# Patient Record
Sex: FEMALE | Race: WHITE | Employment: OTHER | URBAN - NONMETROPOLITAN AREA
[De-identification: names, ages, dates, MRNs, and addresses within clinical notes are randomized per-mention and may not be internally consistent; named-entity substitution may affect disease eponyms.]

---

## 2017-03-09 ENCOUNTER — OFFICE VISIT (OUTPATIENT)
Dept: PHYSICAL MEDICINE AND REHAB | Age: 82
End: 2017-03-09

## 2017-03-09 VITALS
DIASTOLIC BLOOD PRESSURE: 72 MMHG | BODY MASS INDEX: 23.3 KG/M2 | HEIGHT: 66 IN | SYSTOLIC BLOOD PRESSURE: 139 MMHG | WEIGHT: 145 LBS | HEART RATE: 70 BPM

## 2017-03-09 DIAGNOSIS — G62.0 DRUG-INDUCED POLYNEUROPATHY (HCC): ICD-10-CM

## 2017-03-09 DIAGNOSIS — R56.9 PARTIAL SEIZURE (HCC): Primary | ICD-10-CM

## 2017-03-09 PROCEDURE — G8420 CALC BMI NORM PARAMETERS: HCPCS | Performed by: PSYCHIATRY & NEUROLOGY

## 2017-03-09 PROCEDURE — 1036F TOBACCO NON-USER: CPT | Performed by: PSYCHIATRY & NEUROLOGY

## 2017-03-09 PROCEDURE — 1123F ACP DISCUSS/DSCN MKR DOCD: CPT | Performed by: PSYCHIATRY & NEUROLOGY

## 2017-03-09 PROCEDURE — 1090F PRES/ABSN URINE INCON ASSESS: CPT | Performed by: PSYCHIATRY & NEUROLOGY

## 2017-03-09 PROCEDURE — G8484 FLU IMMUNIZE NO ADMIN: HCPCS | Performed by: PSYCHIATRY & NEUROLOGY

## 2017-03-09 PROCEDURE — 99213 OFFICE O/P EST LOW 20 MIN: CPT | Performed by: PSYCHIATRY & NEUROLOGY

## 2017-03-09 PROCEDURE — G8400 PT W/DXA NO RESULTS DOC: HCPCS | Performed by: PSYCHIATRY & NEUROLOGY

## 2017-03-09 PROCEDURE — G8427 DOCREV CUR MEDS BY ELIG CLIN: HCPCS | Performed by: PSYCHIATRY & NEUROLOGY

## 2017-03-09 PROCEDURE — 4040F PNEUMOC VAC/ADMIN/RCVD: CPT | Performed by: PSYCHIATRY & NEUROLOGY

## 2017-03-13 DIAGNOSIS — R56.9 PARTIAL SEIZURE (HCC): ICD-10-CM

## 2017-03-13 RX ORDER — PHENYTOIN SODIUM 100 MG/1
CAPSULE, EXTENDED RELEASE ORAL
Qty: 225 CAPSULE | Refills: 1 | Status: SHIPPED | OUTPATIENT
Start: 2017-03-13 | End: 2017-09-13 | Stop reason: SDUPTHER

## 2017-03-18 DIAGNOSIS — R56.9 PARTIAL SEIZURE (HCC): ICD-10-CM

## 2017-03-20 RX ORDER — PHENYTOIN SODIUM 100 MG/1
CAPSULE, EXTENDED RELEASE ORAL
Qty: 270 CAPSULE | Refills: 3 | Status: SHIPPED | OUTPATIENT
Start: 2017-03-20 | End: 2017-03-27 | Stop reason: SDUPTHER

## 2017-03-23 ENCOUNTER — OFFICE VISIT (OUTPATIENT)
Dept: PHYSICAL MEDICINE AND REHAB | Age: 82
End: 2017-03-23

## 2017-03-23 VITALS
HEIGHT: 66 IN | DIASTOLIC BLOOD PRESSURE: 68 MMHG | SYSTOLIC BLOOD PRESSURE: 130 MMHG | HEART RATE: 72 BPM | WEIGHT: 145 LBS | BODY MASS INDEX: 23.3 KG/M2

## 2017-03-23 DIAGNOSIS — G62.0 DRUG-INDUCED POLYNEUROPATHY (HCC): Primary | ICD-10-CM

## 2017-03-23 DIAGNOSIS — R56.9 PARTIAL SEIZURE (HCC): ICD-10-CM

## 2017-03-23 PROCEDURE — 1036F TOBACCO NON-USER: CPT | Performed by: PAIN MEDICINE

## 2017-03-23 PROCEDURE — 1123F ACP DISCUSS/DSCN MKR DOCD: CPT | Performed by: PAIN MEDICINE

## 2017-03-23 PROCEDURE — 4040F PNEUMOC VAC/ADMIN/RCVD: CPT | Performed by: PAIN MEDICINE

## 2017-03-23 PROCEDURE — G8427 DOCREV CUR MEDS BY ELIG CLIN: HCPCS | Performed by: PAIN MEDICINE

## 2017-03-23 PROCEDURE — 1090F PRES/ABSN URINE INCON ASSESS: CPT | Performed by: PAIN MEDICINE

## 2017-03-23 PROCEDURE — G8400 PT W/DXA NO RESULTS DOC: HCPCS | Performed by: PAIN MEDICINE

## 2017-03-23 PROCEDURE — G8484 FLU IMMUNIZE NO ADMIN: HCPCS | Performed by: PAIN MEDICINE

## 2017-03-23 PROCEDURE — G8420 CALC BMI NORM PARAMETERS: HCPCS | Performed by: PAIN MEDICINE

## 2017-03-23 PROCEDURE — 99205 OFFICE O/P NEW HI 60 MIN: CPT | Performed by: PAIN MEDICINE

## 2017-03-23 RX ORDER — DULOXETIN HYDROCHLORIDE 30 MG/1
30 CAPSULE, DELAYED RELEASE ORAL DAILY
Qty: 30 CAPSULE | Refills: 0 | Status: SHIPPED | OUTPATIENT
Start: 2017-03-23 | End: 2017-03-27

## 2017-03-23 ASSESSMENT — ENCOUNTER SYMPTOMS
WHEEZING: 0
RECTAL PAIN: 0
ANAL BLEEDING: 0
SHORTNESS OF BREATH: 0
BLOOD IN STOOL: 0
COLOR CHANGE: 0
SORE THROAT: 0
SINUS PRESSURE: 0
EYE DISCHARGE: 0
VOICE CHANGE: 0
DIARRHEA: 0
CONSTIPATION: 0
FACIAL SWELLING: 0
TROUBLE SWALLOWING: 0
COUGH: 0
VOMITING: 0
CHEST TIGHTNESS: 0
BACK PAIN: 1
NAUSEA: 0
RHINORRHEA: 0
PHOTOPHOBIA: 0
ABDOMINAL DISTENTION: 0
EYE PAIN: 0
STRIDOR: 0
ABDOMINAL PAIN: 0
EYE ITCHING: 0
EYE REDNESS: 0
APNEA: 0
CHOKING: 0

## 2017-03-27 ENCOUNTER — OFFICE VISIT (OUTPATIENT)
Dept: INTERNAL MEDICINE | Age: 82
End: 2017-03-27

## 2017-03-27 VITALS
HEIGHT: 66 IN | WEIGHT: 145 LBS | DIASTOLIC BLOOD PRESSURE: 70 MMHG | SYSTOLIC BLOOD PRESSURE: 132 MMHG | HEART RATE: 70 BPM | BODY MASS INDEX: 23.3 KG/M2

## 2017-03-27 DIAGNOSIS — R56.9 PARTIAL SEIZURE (HCC): ICD-10-CM

## 2017-03-27 DIAGNOSIS — G62.0 DRUG-INDUCED POLYNEUROPATHY (HCC): Primary | ICD-10-CM

## 2017-03-27 PROCEDURE — G8427 DOCREV CUR MEDS BY ELIG CLIN: HCPCS | Performed by: INTERNAL MEDICINE

## 2017-03-27 PROCEDURE — 1036F TOBACCO NON-USER: CPT | Performed by: INTERNAL MEDICINE

## 2017-03-27 PROCEDURE — G8400 PT W/DXA NO RESULTS DOC: HCPCS | Performed by: INTERNAL MEDICINE

## 2017-03-27 PROCEDURE — 99213 OFFICE O/P EST LOW 20 MIN: CPT | Performed by: INTERNAL MEDICINE

## 2017-03-27 PROCEDURE — 1090F PRES/ABSN URINE INCON ASSESS: CPT | Performed by: INTERNAL MEDICINE

## 2017-03-27 PROCEDURE — G8484 FLU IMMUNIZE NO ADMIN: HCPCS | Performed by: INTERNAL MEDICINE

## 2017-03-27 PROCEDURE — 4040F PNEUMOC VAC/ADMIN/RCVD: CPT | Performed by: INTERNAL MEDICINE

## 2017-03-27 PROCEDURE — 1123F ACP DISCUSS/DSCN MKR DOCD: CPT | Performed by: INTERNAL MEDICINE

## 2017-03-27 PROCEDURE — G8420 CALC BMI NORM PARAMETERS: HCPCS | Performed by: INTERNAL MEDICINE

## 2017-03-27 RX ORDER — GABAPENTIN 100 MG/1
100 CAPSULE ORAL 3 TIMES DAILY
Qty: 90 CAPSULE | Refills: 3 | Status: SHIPPED | OUTPATIENT
Start: 2017-03-27 | End: 2017-06-07

## 2017-03-27 ASSESSMENT — PATIENT HEALTH QUESTIONNAIRE - PHQ9
SUM OF ALL RESPONSES TO PHQ9 QUESTIONS 1 & 2: 0
2. FEELING DOWN, DEPRESSED OR HOPELESS: 0
SUM OF ALL RESPONSES TO PHQ QUESTIONS 1-9: 0
1. LITTLE INTEREST OR PLEASURE IN DOING THINGS: 0

## 2017-03-29 ENCOUNTER — TELEPHONE (OUTPATIENT)
Dept: INTERNAL MEDICINE | Age: 82
End: 2017-03-29

## 2017-04-05 ENCOUNTER — TELEPHONE (OUTPATIENT)
Dept: FAMILY MEDICINE CLINIC | Age: 82
End: 2017-04-05

## 2017-06-01 LAB
ABSOLUTE BASO #: 0 K/UL (ref 0–0.1)
ABSOLUTE EOS #: 0.1 K/UL (ref 0.1–0.4)
ABSOLUTE LYMPH #: 0.9 K/UL (ref 0.8–5.2)
ABSOLUTE MONO #: 0.4 K/UL (ref 0.1–0.9)
ABSOLUTE NEUT #: 2.7 K/UL (ref 1.3–9.1)
ALBUMIN SERPL-MCNC: 4 G/DL (ref 3.2–5.3)
ALK PHOSPHATASE: 127 IU/L (ref 35–121)
ALT SERPL-CCNC: 9 IU/L (ref 5–59)
AST SERPL-CCNC: 14 IU/L (ref 10–42)
BASOPHILS RELATIVE PERCENT: 0.7 %
BILIRUB SERPL-MCNC: 0.4 MG/DL (ref 0.2–1.3)
BILIRUBIN DIRECT: 0.1 MG/DL (ref 0–0.2)
EOSINOPHILS RELATIVE PERCENT: 3.4 %
HCT VFR BLD CALC: 39.6 % (ref 36–48)
HEMOGLOBIN: 13.9 G/DL (ref 12–16)
LYMPHOCYTE %: 21.1 %
MCH RBC QN AUTO: 33.7 PG (ref 27–34)
MCHC RBC AUTO-ENTMCNC: 35.1 G/DL (ref 31–36)
MCV RBC AUTO: 96.1 FL (ref 80–100)
MONOCYTES # BLD: 9.7 %
NEUTROPHILS RELATIVE PERCENT: 64.9 %
PDW BLD-RTO: 12.3 % (ref 10.8–14.8)
PLATELETS: 161 K/UL (ref 150–450)
RBC: 4.12 M/UL (ref 4–5.5)
TOTAL PROTEIN: 6.4 G/DL (ref 5.8–8)
WBC: 4.1 K/UL (ref 3.7–10.8)

## 2017-06-02 ENCOUNTER — TELEPHONE (OUTPATIENT)
Dept: PHYSICAL MEDICINE AND REHAB | Age: 82
End: 2017-06-02

## 2017-06-02 LAB
DILANTIN FREE/TOTAL: 12.2 UG/ML (ref 10–20)
DOSE TIME: NORMAL

## 2017-06-07 ENCOUNTER — OFFICE VISIT (OUTPATIENT)
Dept: INTERNAL MEDICINE | Age: 82
End: 2017-06-07

## 2017-06-07 VITALS
HEIGHT: 66 IN | WEIGHT: 152 LBS | SYSTOLIC BLOOD PRESSURE: 114 MMHG | BODY MASS INDEX: 24.43 KG/M2 | HEART RATE: 62 BPM | DIASTOLIC BLOOD PRESSURE: 66 MMHG

## 2017-06-07 DIAGNOSIS — G62.0 DRUG-INDUCED POLYNEUROPATHY (HCC): Primary | ICD-10-CM

## 2017-06-07 PROCEDURE — 4040F PNEUMOC VAC/ADMIN/RCVD: CPT | Performed by: INTERNAL MEDICINE

## 2017-06-07 PROCEDURE — G8427 DOCREV CUR MEDS BY ELIG CLIN: HCPCS | Performed by: INTERNAL MEDICINE

## 2017-06-07 PROCEDURE — 1036F TOBACCO NON-USER: CPT | Performed by: INTERNAL MEDICINE

## 2017-06-07 PROCEDURE — 1123F ACP DISCUSS/DSCN MKR DOCD: CPT | Performed by: INTERNAL MEDICINE

## 2017-06-07 PROCEDURE — 1090F PRES/ABSN URINE INCON ASSESS: CPT | Performed by: INTERNAL MEDICINE

## 2017-06-07 PROCEDURE — G8420 CALC BMI NORM PARAMETERS: HCPCS | Performed by: INTERNAL MEDICINE

## 2017-06-07 PROCEDURE — G8400 PT W/DXA NO RESULTS DOC: HCPCS | Performed by: INTERNAL MEDICINE

## 2017-06-07 PROCEDURE — 99214 OFFICE O/P EST MOD 30 MIN: CPT | Performed by: INTERNAL MEDICINE

## 2017-07-10 ENCOUNTER — TELEPHONE (OUTPATIENT)
Dept: INTERNAL MEDICINE | Age: 82
End: 2017-07-10

## 2017-08-24 ENCOUNTER — TELEPHONE (OUTPATIENT)
Dept: FAMILY MEDICINE CLINIC | Age: 82
End: 2017-08-24

## 2017-08-25 ENCOUNTER — TELEPHONE (OUTPATIENT)
Dept: NEUROLOGY | Age: 82
End: 2017-08-25

## 2017-09-13 ENCOUNTER — OFFICE VISIT (OUTPATIENT)
Dept: NEUROLOGY | Age: 82
End: 2017-09-13
Payer: MEDICARE

## 2017-09-13 VITALS
BODY MASS INDEX: 23.3 KG/M2 | HEART RATE: 68 BPM | HEIGHT: 66 IN | SYSTOLIC BLOOD PRESSURE: 137 MMHG | WEIGHT: 145 LBS | DIASTOLIC BLOOD PRESSURE: 73 MMHG

## 2017-09-13 DIAGNOSIS — R56.9 PARTIAL SEIZURE (HCC): Primary | ICD-10-CM

## 2017-09-13 DIAGNOSIS — R56.9 PARTIAL SEIZURE (HCC): ICD-10-CM

## 2017-09-13 PROCEDURE — 1123F ACP DISCUSS/DSCN MKR DOCD: CPT | Performed by: PSYCHIATRY & NEUROLOGY

## 2017-09-13 PROCEDURE — G8400 PT W/DXA NO RESULTS DOC: HCPCS | Performed by: PSYCHIATRY & NEUROLOGY

## 2017-09-13 PROCEDURE — G8420 CALC BMI NORM PARAMETERS: HCPCS | Performed by: PSYCHIATRY & NEUROLOGY

## 2017-09-13 PROCEDURE — 1090F PRES/ABSN URINE INCON ASSESS: CPT | Performed by: PSYCHIATRY & NEUROLOGY

## 2017-09-13 PROCEDURE — 99213 OFFICE O/P EST LOW 20 MIN: CPT | Performed by: PSYCHIATRY & NEUROLOGY

## 2017-09-13 PROCEDURE — 4040F PNEUMOC VAC/ADMIN/RCVD: CPT | Performed by: PSYCHIATRY & NEUROLOGY

## 2017-09-13 PROCEDURE — 1036F TOBACCO NON-USER: CPT | Performed by: PSYCHIATRY & NEUROLOGY

## 2017-09-13 PROCEDURE — G8427 DOCREV CUR MEDS BY ELIG CLIN: HCPCS | Performed by: PSYCHIATRY & NEUROLOGY

## 2017-09-13 RX ORDER — PHENYTOIN SODIUM 100 MG/1
CAPSULE, EXTENDED RELEASE ORAL
Qty: 225 CAPSULE | Refills: 1 | Status: SHIPPED | OUTPATIENT
Start: 2017-09-13 | End: 2018-04-03 | Stop reason: SDUPTHER

## 2017-09-13 RX ORDER — GABAPENTIN 100 MG/1
100 CAPSULE ORAL 2 TIMES DAILY
Qty: 60 CAPSULE | Refills: 1 | Status: SHIPPED | OUTPATIENT
Start: 2017-09-13 | End: 2017-12-18

## 2017-09-20 ENCOUNTER — OFFICE VISIT (OUTPATIENT)
Dept: INTERNAL MEDICINE CLINIC | Age: 82
End: 2017-09-20
Payer: MEDICARE

## 2017-09-20 DIAGNOSIS — Z23 NEED FOR INFLUENZA VACCINATION: Primary | ICD-10-CM

## 2017-09-20 PROCEDURE — G0008 ADMIN INFLUENZA VIRUS VAC: HCPCS | Performed by: INTERNAL MEDICINE

## 2017-09-20 PROCEDURE — 90662 IIV NO PRSV INCREASED AG IM: CPT | Performed by: INTERNAL MEDICINE

## 2017-09-21 ENCOUNTER — TELEPHONE (OUTPATIENT)
Dept: NEUROLOGY | Age: 82
End: 2017-09-21

## 2017-11-02 ENCOUNTER — TELEPHONE (OUTPATIENT)
Dept: INTERNAL MEDICINE CLINIC | Age: 82
End: 2017-11-02

## 2017-11-02 NOTE — TELEPHONE ENCOUNTER
Pt called stated she needs her neuropathy pain taken care of. Stated that Dr. Kamilla Newby has referred her to a specialist in [facility] and she can not go there and feels there is something local instead. Has seen Dr. Vicki Odom once and has not been back for follow-up because she does not want to see the NP and she could not tolerate the Gabapentin. She is wanting your opinion as to what she can do for the neuropathy and also if you know of a Sheryl Luther with [facility] physical medicine?

## 2017-11-02 NOTE — TELEPHONE ENCOUNTER
left message that he wanted to speak to Dr. Jamaal Edouard MA regarding a procedure that his wife is needing.

## 2017-11-02 NOTE — TELEPHONE ENCOUNTER
Only a limited no of meds for same, all with some side effects    I dont know this person; see if can find info    Could consider Quell; commercials on tv and could look up on internet.   It is an electrical device to suppress pain impulses

## 2017-11-03 NOTE — TELEPHONE ENCOUNTER
Pt informed of Dr. Duyen Scott response and that Kevin Monroy is the Director of Rehab Services at [address] [place] [address]. She verbalized understanding, and will look into the Quell device.

## 2017-11-06 ENCOUNTER — TELEPHONE (OUTPATIENT)
Dept: INTERNAL MEDICINE CLINIC | Age: 82
End: 2017-11-06

## 2017-11-06 NOTE — TELEPHONE ENCOUNTER
Patient called stating that Dr. Frank Harada want her to be referred to a specialist out of town for her neuropathy and they can not travel that far . She states that she does not think the Quell will work for her feet as it is a wrap. Patient is going to check on socks that are for diabetic neuropathy but she is getting desperate and afraid she could fall . Patient was asking if you would prescribe her Gabapentin as you have in the past. Tried to explain to patient that if she is not seen in the office regularly that you cannot prescribe Gabapentin and that Dr. Frank Harada is following for the neuropathy but patient is insistent . Please advise.

## 2017-12-18 ENCOUNTER — APPOINTMENT (OUTPATIENT)
Dept: GENERAL RADIOLOGY | Age: 82
End: 2017-12-18
Payer: MEDICARE

## 2017-12-18 ENCOUNTER — HOSPITAL ENCOUNTER (EMERGENCY)
Age: 82
Discharge: HOME | End: 2017-12-18
Attending: FAMILY MEDICINE
Payer: MEDICARE

## 2017-12-18 VITALS
DIASTOLIC BLOOD PRESSURE: 69 MMHG | WEIGHT: 150 LBS | BODY MASS INDEX: 24.11 KG/M2 | OXYGEN SATURATION: 97 % | HEART RATE: 76 BPM | SYSTOLIC BLOOD PRESSURE: 158 MMHG | TEMPERATURE: 97.6 F | RESPIRATION RATE: 16 BRPM | HEIGHT: 66 IN
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
## 2017-12-18 DIAGNOSIS — S49.90XA SHOULDER INJURY, INITIAL ENCOUNTER: ICD-10-CM

## 2017-12-18 LAB
ANION GAP SERPL CALCULATED.3IONS-SCNC: 13 MEQ/L (ref 8–16)
ANISOCYTOSIS: ABNORMAL
BACTERIA: ABNORMAL /HPF
BASOPHILS # BLD: 0.4 %
BASOPHILS ABSOLUTE: 0 THOU/MM3 (ref 0–0.1)
BILIRUBIN URINE: NEGATIVE
BLOOD, URINE: ABNORMAL
BUN BLDV-MCNC: 22 MG/DL (ref 7–22)
CALCIUM SERPL-MCNC: 9.3 MG/DL (ref 8.5–10.5)
CASTS 2: ABNORMAL /LPF
CASTS UA: ABNORMAL /LPF
CHARACTER, URINE: CLEAR
CHLORIDE BLD-SCNC: 101 MEQ/L (ref 98–111)
CO2: 27 MEQ/L (ref 23–33)
COLOR: YELLOW
CREAT SERPL-MCNC: 0.8 MG/DL (ref 0.4–1.2)
CRYSTALS, UA: ABNORMAL
EKG ATRIAL RATE: 74 BPM
EKG P AXIS: 15 DEGREES
EKG P-R INTERVAL: 168 MS
EKG Q-T INTERVAL: 394 MS
EKG QRS DURATION: 116 MS
EKG QTC CALCULATION (BAZETT): 437 MS
EKG R AXIS: -35 DEGREES
EKG T AXIS: 90 DEGREES
EKG VENTRICULAR RATE: 74 BPM
EOSINOPHIL # BLD: 0.8 %
EOSINOPHILS ABSOLUTE: 0 THOU/MM3 (ref 0–0.4)
EPITHELIAL CELLS, UA: ABNORMAL /HPF
GFR SERPL CREATININE-BSD FRML MDRD: 68 ML/MIN/1.73M2
GLUCOSE BLD-MCNC: 111 MG/DL (ref 70–108)
GLUCOSE URINE: NEGATIVE MG/DL
HCT VFR BLD CALC: 41.1 % (ref 37–47)
HEMOGLOBIN: 13.9 GM/DL (ref 12–16)
KETONES, URINE: NEGATIVE
LEUKOCYTE ESTERASE, URINE: NEGATIVE
LYMPHOCYTES # BLD: 12.3 %
LYMPHOCYTES ABSOLUTE: 0.6 THOU/MM3 (ref 1–4.8)
MACROCYTES: ABNORMAL
MCH RBC QN AUTO: 34.8 PG (ref 27–31)
MCHC RBC AUTO-ENTMCNC: 33.8 GM/DL (ref 33–37)
MCV RBC AUTO: 102.8 FL (ref 81–99)
MISCELLANEOUS 2: ABNORMAL
MONOCYTES # BLD: 7.4 %
MONOCYTES ABSOLUTE: 0.4 THOU/MM3 (ref 0.4–1.3)
NITRITE, URINE: NEGATIVE
NUCLEATED RED BLOOD CELLS: 0 /100 WBC
OSMOLALITY CALCULATION: 285.3 MOSMOL/KG (ref 275–300)
PDW BLD-RTO: 14.6 % (ref 11.5–14.5)
PH UA: 5.5
PHENYTOIN LEVEL: 15.1 UG/ML (ref 6–18)
PLATELET # BLD: 134 THOU/MM3 (ref 130–400)
PMV BLD AUTO: 7.7 MCM (ref 7.4–10.4)
POTASSIUM SERPL-SCNC: 4.4 MEQ/L (ref 3.5–5.2)
PROTEIN UA: NEGATIVE
RBC # BLD: 4 MILL/MM3 (ref 4.2–5.4)
RBC URINE: ABNORMAL /HPF
RENAL EPITHELIAL, UA: ABNORMAL
SEG NEUTROPHILS: 79.1 %
SEGMENTED NEUTROPHILS ABSOLUTE COUNT: 4 THOU/MM3 (ref 1.8–7.7)
SODIUM BLD-SCNC: 141 MEQ/L (ref 135–145)
SPECIFIC GRAVITY, URINE: 1.01 (ref 1–1.03)
TROPONIN T: < 0.01 NG/ML
UROBILINOGEN, URINE: 0.2 EU/DL
WBC # BLD: 5 THOU/MM3 (ref 4.8–10.8)
WBC UA: ABNORMAL /HPF
YEAST: ABNORMAL

## 2017-12-18 PROCEDURE — 81001 URINALYSIS AUTO W/SCOPE: CPT

## 2017-12-18 PROCEDURE — 73060 X-RAY EXAM OF HUMERUS: CPT

## 2017-12-18 PROCEDURE — 93005 ELECTROCARDIOGRAM TRACING: CPT

## 2017-12-18 PROCEDURE — 36415 COLL VENOUS BLD VENIPUNCTURE: CPT

## 2017-12-18 PROCEDURE — 80048 BASIC METABOLIC PNL TOTAL CA: CPT

## 2017-12-18 PROCEDURE — 99284 EMERGENCY DEPT VISIT MOD MDM: CPT

## 2017-12-18 PROCEDURE — 84484 ASSAY OF TROPONIN QUANT: CPT

## 2017-12-18 PROCEDURE — 73030 X-RAY EXAM OF SHOULDER: CPT

## 2017-12-18 PROCEDURE — 85025 COMPLETE CBC W/AUTO DIFF WBC: CPT

## 2017-12-18 PROCEDURE — 73090 X-RAY EXAM OF FOREARM: CPT

## 2017-12-18 PROCEDURE — 80185 ASSAY OF PHENYTOIN TOTAL: CPT

## 2017-12-18 ASSESSMENT — ENCOUNTER SYMPTOMS
BACK PAIN: 0
DIARRHEA: 0
WHEEZING: 0
VOMITING: 0
ABDOMINAL PAIN: 0
SORE THROAT: 0
EYE DISCHARGE: 0
EYE PAIN: 0
NAUSEA: 0
RHINORRHEA: 0
SHORTNESS OF BREATH: 0
COUGH: 0

## 2017-12-18 NOTE — ED PROVIDER NOTES
[facility]  eMERGENCY dEPARTMENT eNCOUnter          CHIEF COMPLAINT       Chief Complaint   Patient presents with    Fall       Nurses Notes reviewed and I agree except as noted in the HPI. HISTORY OF PRESENT ILLNESS    Cuca Salvador is a 80 y.o. female who presents to the Emergency Department for the evaluation after fall. Patient reports that she fell at home today and reports a history of neuropathy from her feet to her knees, and she has a new cane and new shoes that she may have tripped over. Patient reports pain in right arm but denies pain in her head, chest, hips, or back. Patient reports that she hit her face but did not hit her head and denies loss of consciousness. Patient reports that she was ambulatory after the fall and she reports history of stiff neck that she sees a chiropractor for intermittently. Patient reports that she has intermittent right shoulder pain, but her chiropractor attributes that pain to her neck. Patient is alert and oriented x3. No further complaints at the time of the initial encounter. The HPI was provided by the patient. REVIEW OF SYSTEMS     Review of Systems   Constitutional: Negative for appetite change, chills, fatigue and fever. HENT: Negative for congestion, ear pain, rhinorrhea and sore throat. Eyes: Negative for pain, discharge and visual disturbance. Respiratory: Negative for cough, shortness of breath and wheezing. Cardiovascular: Negative for chest pain, palpitations and leg swelling. Gastrointestinal: Negative for abdominal pain, diarrhea, nausea and vomiting. Genitourinary: Negative for difficulty urinating, dysuria and vaginal discharge. Musculoskeletal: Positive for arthralgias (right shoulder) and myalgias (right arm). Negative for back pain, joint swelling and neck pain. Skin: Negative for pallor and rash. Neurological: Negative for dizziness, syncope, weakness, light-headedness and headaches.    Hematological: Negative for adenopathy. Psychiatric/Behavioral: Negative for confusion, dysphoric mood and suicidal ideas. The patient is not nervous/anxious. PAST MEDICAL HISTORY    has a past medical history of Fall due to slipping on ice or snow; History of colon cancer; Hyperplastic colon polyp; Irritable bowel syndrome; Osteoarthritis; Seizure disorder ([address] Seizures ([address] and Sprain and strain. SURGICAL HISTORY      has a past surgical history that includes Cataract removal (); colectomy (); Cataract removal (); Colonoscopy (); and Hysterectomy (). CURRENT MEDICATIONS       Discharge Medication List as of 2017  8:42 PM      CONTINUE these medications which have NOT CHANGED    Details   DILANTIN 100 MG ER capsule Alternate taking two of the pills on one day and then three of the pills on the next., Disp-225 capsule, R-1, DAWNormal      Cholecalciferol (VITAMIN D PO) Take 2,000 mg by mouth daily. ALLERGIES     is allergic to cefuroxime axetil and sulfa antibiotics. FAMILY HISTORY     indicated that her mother is . She indicated that her father is . family history includes Heart Disease in her mother. SOCIAL HISTORY      reports that she has never smoked. She has never used smokeless tobacco. She reports that she does not drink alcohol or use drugs. PHYSICAL EXAM     INITIAL VITALS:  height is 5' 6\" (1.676 m) and weight is 150 lb (68 kg). Her oral temperature is 97.6 °F (36.4 °C). Her blood pressure is 158/69 (abnormal) and her pulse is 76. Her respiration is 16 and oxygen saturation is 97%. Physical Exam   Constitutional: She is oriented to person, place, and time. She appears well-developed and well-nourished. HENT:   Head: Normocephalic and atraumatic. Right Ear: External ear normal.   Left Ear: External ear normal.   Eyes: Conjunctivae are normal. Right eye exhibits no discharge. Left eye exhibits no discharge. No scleral icterus. Neck: Normal range of motion. Neck supple. No JVD present. Cardiovascular: Normal rate, regular rhythm and normal heart sounds. Exam reveals no gallop and no friction rub. No murmur heard. Pulmonary/Chest: Effort normal and breath sounds normal. No respiratory distress. She has no decreased breath sounds. She has no wheezes. She has no rhonchi. She has no rales. Abdominal: Soft. She exhibits no distension. There is no tenderness. There is no rebound and no guarding. Musculoskeletal: Normal range of motion. She exhibits no edema. Right shoulder: She exhibits tenderness. Right wrist: She exhibits tenderness. Right hip: Normal. She exhibits no tenderness. Left hip: Normal. She exhibits no tenderness. Cervical back: Normal. She exhibits no tenderness. Thoracic back: Normal. She exhibits no tenderness. Lumbar back: Normal. She exhibits no tenderness. Right upper arm: She exhibits tenderness. Negative bilateral leg raise, mild tenderness to the right shoulder, humerus, and distal radius/ulna. Neurological: She is alert and oriented to person, place, and time. She has normal strength. No cranial nerve deficit or sensory deficit. She exhibits normal muscle tone. She displays no seizure activity. GCS eye subscore is 4. GCS verbal subscore is 5. GCS motor subscore is 6. Patient is alert and oriented    Skin: Skin is warm and dry. No rash noted. She is not diaphoretic. Psychiatric: She has a normal mood and affect. Her behavior is normal. Thought content normal.   Nursing note and vitals reviewed.       DIFFERENTIAL DIAGNOSIS:    Right arm strain, sprain, contusion, fracture     DIAGNOSTIC RESULTS     EKG: All EKG's are interpreted by the Emergency Department Physician who either signs or Co-signs this chart in the absence of a cardiologist.    None       RADIOLOGY: non-plain film images(s) such as CT, Ultrasound and MRI are read by the radiologist.    XR RADIUS ULNA RIGHT (2 VIEWS)   Final Result   1. No acute fracture or malalignment. **This report has been created using voice recognition software. It may contain minor errors which are inherent in voice recognition technology. **      Final report electronically signed by Dr. Shayan Campos on 12/18/2017 7:12 PM      XR HUMERUS RIGHT (MIN 2 VIEWS)   Final Result   1. No acute fracture or malalignment. **This report has been created using voice recognition software. It may contain minor errors which are inherent in voice recognition technology. **      Final report electronically signed by Dr. Shayan Campos on 12/18/2017 7:11 PM      XR SHOULDER RIGHT (MIN 2 VIEWS)   Final Result   No fracture is seen. Findings of rotator cuff tear/degeneration. **This report has been created using voice recognition software. It may contain minor errors which are inherent in voice recognition technology. **      Final report electronically signed by Dr. Noel Baird on 12/18/2017 7:09 PM          LABS:   Labs Reviewed   CBC WITH AUTO DIFFERENTIAL - Abnormal; Notable for the following:        Result Value    RBC 4.00 (*)     .8 (*)     MCH 34.8 (*)     RDW 14.6 (*)     Lymphocytes # 0.6 (*)     All other components within normal limits   BASIC METABOLIC PANEL - Abnormal; Notable for the following:     Glucose 111 (*)     All other components within normal limits   GLOMERULAR FILTRATION RATE, ESTIMATED - Abnormal; Notable for the following:     Est, Glom Filt Rate 68 (*)     All other components within normal limits   URINE WITH REFLEXED MICRO - Abnormal; Notable for the following:     Blood, Urine TRACE (*)     All other components within normal limits   TROPONIN   PHENYTOIN LEVEL, TOTAL   ANION GAP   OSMOLALITY       EMERGENCY DEPARTMENT COURSE:   Vitals:    Vitals:    12/18/17 1654 12/18/17 1800 12/18/17 1854   BP: (!) 174/69 (!) 160/71 (!) 158/69   Pulse: 73 72 76   Resp:

## 2017-12-19 ENCOUNTER — TELEPHONE (OUTPATIENT)
Dept: FAMILY MEDICINE CLINIC | Age: 82
End: 2017-12-19

## 2017-12-19 ENCOUNTER — CARE COORDINATION (OUTPATIENT)
Dept: CASE MANAGEMENT | Age: 82
End: 2017-12-19

## 2017-12-19 NOTE — TELEPHONE ENCOUNTER
Spouse called stating pt fell yesterday and went to ER. Spouse ask if pt can add OTC Tylenol with her Dilantin to help with her pain?     Presley De Leon called Dr Anthony Lucas [phone]

## 2017-12-19 NOTE — CARE COORDINATION
Care Transition  ED Follow up Call    Reason for ED visit:   How are you feeling? Sore today from fall  Status:     improved    Do you have any questions related to your discharge instructions? no    Review of Instructions:    Discharged with new prescription? no    Review Medications:  Yes   Do you have any questions related to your medications? no    Understands what to report/when to return?:  Yes   Do you have a follow up appointment scheduled? No    Do you have any needs or concerns I can assist you with? no     FU appts/Provider:    Future Appointments  Date Time Provider Shelia Lui   5/16/2018 1:00 PM Vaishnavi Lloyd MD [address] [place], [place] [id number] [facility]       Summary: Spoke with Queta Pringleyeniferraghu, introduced self/role. Stated she is very sore today, taking Tylenol with minimal relief. Stated she is going to call Dr. Omaira Bhatti office to see which pain medication she can take since she is on Dilantin. Will call to schedule appointment with [facility]. Instructed patient to call me with concerns or trouble scheduling appointments. Verbalized understanding. Denies further needs at this time. Is currently using quad cane. Has transportation to appointments.

## 2018-01-09 ENCOUNTER — TELEPHONE (OUTPATIENT)
Dept: INTERNAL MEDICINE CLINIC | Age: 83
End: 2018-01-09

## 2018-02-12 ENCOUNTER — APPOINTMENT (OUTPATIENT)
Dept: GENERAL RADIOLOGY | Age: 83
DRG: 470 | End: 2018-02-12
Payer: MEDICARE

## 2018-02-12 ENCOUNTER — HOSPITAL ENCOUNTER (INPATIENT)
Age: 83
LOS: 3 days | Discharge: NURSING, REHAB OR LONG-TERM CARE | DRG: 470 | End: 2018-02-16
Attending: EMERGENCY MEDICINE | Admitting: ORTHOPAEDIC SURGERY
Payer: MEDICARE

## 2018-02-12 DIAGNOSIS — S72.141A CLOSED DISPLACED INTERTROCHANTERIC FRACTURE OF RIGHT FEMUR, INITIAL ENCOUNTER (HCC): Primary | ICD-10-CM

## 2018-02-12 DIAGNOSIS — Z96.641 STATUS POST RIGHT HIP REPLACEMENT: ICD-10-CM

## 2018-02-12 LAB
ALBUMIN SERPL-MCNC: 4 G/DL (ref 3.5–5.1)
ALP BLD-CCNC: 133 U/L (ref 38–126)
ALT SERPL-CCNC: 10 U/L (ref 11–66)
AMPHETAMINE+METHAMPHETAMINE URINE SCREEN: NEGATIVE
ANION GAP SERPL CALCULATED.3IONS-SCNC: 15 MEQ/L (ref 8–16)
AST SERPL-CCNC: 13 U/L (ref 5–40)
BACTERIA: ABNORMAL /HPF
BARBITURATE QUANTITATIVE URINE: NEGATIVE
BASOPHILS # BLD: 0.6 %
BASOPHILS ABSOLUTE: 0 THOU/MM3 (ref 0–0.1)
BENZODIAZEPINE QUANTITATIVE URINE: NEGATIVE
BILIRUB SERPL-MCNC: 0.2 MG/DL (ref 0.3–1.2)
BILIRUBIN DIRECT: < 0.2 MG/DL (ref 0–0.3)
BILIRUBIN URINE: NEGATIVE
BLOOD, URINE: NEGATIVE
BUN BLDV-MCNC: 23 MG/DL (ref 7–22)
CALCIUM SERPL-MCNC: 9.5 MG/DL (ref 8.5–10.5)
CANNABINOID QUANTITATIVE URINE: NEGATIVE
CASTS 2: ABNORMAL /LPF
CASTS UA: ABNORMAL /LPF
CHARACTER, URINE: CLEAR
CHLORIDE BLD-SCNC: 99 MEQ/L (ref 98–111)
CO2: 23 MEQ/L (ref 23–33)
COCAINE METABOLITE QUANTITATIVE URINE: NEGATIVE
COLOR: YELLOW
CREAT SERPL-MCNC: 1 MG/DL (ref 0.4–1.2)
CRYSTALS, UA: ABNORMAL
EKG ATRIAL RATE: 75 BPM
EKG P AXIS: 95 DEGREES
EKG P-R INTERVAL: 140 MS
EKG Q-T INTERVAL: 406 MS
EKG QRS DURATION: 132 MS
EKG QTC CALCULATION (BAZETT): 453 MS
EKG R AXIS: -17 DEGREES
EKG T AXIS: 85 DEGREES
EKG VENTRICULAR RATE: 75 BPM
EOSINOPHIL # BLD: 2.3 %
EOSINOPHILS ABSOLUTE: 0.1 THOU/MM3 (ref 0–0.4)
EPITHELIAL CELLS, UA: ABNORMAL /HPF
ETHYL ALCOHOL, SERUM: < 0.01 %
GFR SERPL CREATININE-BSD FRML MDRD: 53 ML/MIN/1.73M2
GLUCOSE BLD-MCNC: 127 MG/DL (ref 70–108)
GLUCOSE URINE: NEGATIVE MG/DL
HCT VFR BLD CALC: 39.5 % (ref 37–47)
HEMOGLOBIN: 13.4 GM/DL (ref 12–16)
KETONES, URINE: NEGATIVE
LEUKOCYTE ESTERASE, URINE: NEGATIVE
LIPASE: 74.9 U/L (ref 5.6–51.3)
LYMPHOCYTES # BLD: 18.6 %
LYMPHOCYTES ABSOLUTE: 1.1 THOU/MM3 (ref 1–4.8)
MACROCYTES: ABNORMAL
MAGNESIUM: 2.1 MG/DL (ref 1.6–2.4)
MCH RBC QN AUTO: 34.6 PG (ref 27–31)
MCHC RBC AUTO-ENTMCNC: 33.8 GM/DL (ref 33–37)
MCV RBC AUTO: 102.4 FL (ref 81–99)
MISCELLANEOUS 2: ABNORMAL
MONOCYTES # BLD: 9.2 %
MONOCYTES ABSOLUTE: 0.6 THOU/MM3 (ref 0.4–1.3)
MUCUS: ABNORMAL
NITRITE, URINE: NEGATIVE
NUCLEATED RED BLOOD CELLS: 0 /100 WBC
OPIATES, URINE: NEGATIVE
OSMOLALITY CALCULATION: 279.1 MOSMOL/KG (ref 275–300)
OXYCODONE: NEGATIVE
PDW BLD-RTO: 14.2 % (ref 11.5–14.5)
PH UA: 6.5
PHENCYCLIDINE QUANTITATIVE URINE: NEGATIVE
PLATELET # BLD: 133 THOU/MM3 (ref 130–400)
PMV BLD AUTO: 8 FL (ref 7.4–10.4)
POTASSIUM SERPL-SCNC: 4.2 MEQ/L (ref 3.5–5.2)
PROTEIN UA: ABNORMAL
RBC # BLD: 3.86 MILL/MM3 (ref 4.2–5.4)
RBC URINE: ABNORMAL /HPF
RENAL EPITHELIAL, UA: ABNORMAL
SEG NEUTROPHILS: 69.3 %
SEGMENTED NEUTROPHILS ABSOLUTE COUNT: 4.2 THOU/MM3 (ref 1.8–7.7)
SODIUM BLD-SCNC: 137 MEQ/L (ref 135–145)
SPECIFIC GRAVITY, URINE: 1.02 (ref 1–1.03)
TOTAL PROTEIN: 6.4 G/DL (ref 6.1–8)
TROPONIN T: < 0.01 NG/ML
TSH SERPL DL<=0.05 MIU/L-ACNC: 4.73 UIU/ML (ref 0.4–4.2)
UROBILINOGEN, URINE: 0.2 EU/DL
WBC # BLD: 6 THOU/MM3 (ref 4.8–10.8)
WBC UA: ABNORMAL /HPF
YEAST: ABNORMAL

## 2018-02-12 PROCEDURE — 85025 COMPLETE CBC W/AUTO DIFF WBC: CPT

## 2018-02-12 PROCEDURE — 80185 ASSAY OF PHENYTOIN TOTAL: CPT

## 2018-02-12 PROCEDURE — 96375 TX/PRO/DX INJ NEW DRUG ADDON: CPT

## 2018-02-12 PROCEDURE — 82248 BILIRUBIN DIRECT: CPT

## 2018-02-12 PROCEDURE — G0480 DRUG TEST DEF 1-7 CLASSES: HCPCS

## 2018-02-12 PROCEDURE — 83735 ASSAY OF MAGNESIUM: CPT

## 2018-02-12 PROCEDURE — 80307 DRUG TEST PRSMV CHEM ANLYZR: CPT

## 2018-02-12 PROCEDURE — 80053 COMPREHEN METABOLIC PANEL: CPT

## 2018-02-12 PROCEDURE — 73502 X-RAY EXAM HIP UNI 2-3 VIEWS: CPT

## 2018-02-12 PROCEDURE — 81001 URINALYSIS AUTO W/SCOPE: CPT

## 2018-02-12 PROCEDURE — 93005 ELECTROCARDIOGRAM TRACING: CPT | Performed by: EMERGENCY MEDICINE

## 2018-02-12 PROCEDURE — 84484 ASSAY OF TROPONIN QUANT: CPT

## 2018-02-12 PROCEDURE — 6360000002 HC RX W HCPCS: Performed by: EMERGENCY MEDICINE

## 2018-02-12 PROCEDURE — 96374 THER/PROPH/DIAG INJ IV PUSH: CPT

## 2018-02-12 PROCEDURE — 2580000003 HC RX 258: Performed by: EMERGENCY MEDICINE

## 2018-02-12 PROCEDURE — 36415 COLL VENOUS BLD VENIPUNCTURE: CPT

## 2018-02-12 PROCEDURE — 84443 ASSAY THYROID STIM HORMONE: CPT

## 2018-02-12 PROCEDURE — 99285 EMERGENCY DEPT VISIT HI MDM: CPT

## 2018-02-12 PROCEDURE — 83690 ASSAY OF LIPASE: CPT

## 2018-02-12 RX ORDER — MORPHINE SULFATE 4 MG/ML
4 INJECTION, SOLUTION INTRAMUSCULAR; INTRAVENOUS ONCE
Status: COMPLETED | OUTPATIENT
Start: 2018-02-12 | End: 2018-02-12

## 2018-02-12 RX ORDER — MORPHINE SULFATE 2 MG/ML
6 INJECTION, SOLUTION INTRAMUSCULAR; INTRAVENOUS ONCE
Status: COMPLETED | OUTPATIENT
Start: 2018-02-13 | End: 2018-02-13

## 2018-02-12 RX ORDER — 0.9 % SODIUM CHLORIDE 0.9 %
500 INTRAVENOUS SOLUTION INTRAVENOUS ONCE
Status: COMPLETED | OUTPATIENT
Start: 2018-02-12 | End: 2018-02-13

## 2018-02-12 RX ORDER — ONDANSETRON 2 MG/ML
4 INJECTION INTRAMUSCULAR; INTRAVENOUS ONCE
Status: COMPLETED | OUTPATIENT
Start: 2018-02-12 | End: 2018-02-12

## 2018-02-12 RX ADMIN — SODIUM CHLORIDE 500 ML: 9 INJECTION, SOLUTION INTRAVENOUS at 22:32

## 2018-02-12 RX ADMIN — ONDANSETRON 4 MG: 2 INJECTION INTRAMUSCULAR; INTRAVENOUS at 22:40

## 2018-02-12 RX ADMIN — MORPHINE SULFATE 4 MG: 4 INJECTION, SOLUTION INTRAMUSCULAR; INTRAVENOUS at 22:41

## 2018-02-12 ASSESSMENT — ENCOUNTER SYMPTOMS
BLOOD IN STOOL: 0
COUGH: 0
ABDOMINAL PAIN: 0
ABDOMINAL DISTENTION: 0
NAUSEA: 0
VOMITING: 0
BACK PAIN: 0
EYE PAIN: 0
CHEST TIGHTNESS: 0
EYE DISCHARGE: 0
SINUS PRESSURE: 0
EYE ITCHING: 0
CHOKING: 0
VOICE CHANGE: 0
WHEEZING: 0
SHORTNESS OF BREATH: 0
RHINORRHEA: 0
DIARRHEA: 0
PHOTOPHOBIA: 0
SORE THROAT: 0
CONSTIPATION: 0
TROUBLE SWALLOWING: 0
EYE REDNESS: 0

## 2018-02-12 ASSESSMENT — PAIN DESCRIPTION - ORIENTATION: ORIENTATION: RIGHT

## 2018-02-12 ASSESSMENT — PAIN DESCRIPTION - PAIN TYPE: TYPE: ACUTE PAIN

## 2018-02-12 ASSESSMENT — PAIN DESCRIPTION - PROGRESSION: CLINICAL_PROGRESSION: NOT CHANGED

## 2018-02-12 ASSESSMENT — PAIN DESCRIPTION - LOCATION: LOCATION: HIP

## 2018-02-12 ASSESSMENT — PAIN DESCRIPTION - FREQUENCY: FREQUENCY: CONTINUOUS

## 2018-02-12 ASSESSMENT — PAIN DESCRIPTION - ONSET: ONSET: GRADUAL

## 2018-02-12 ASSESSMENT — PAIN DESCRIPTION - DESCRIPTORS: DESCRIPTORS: ACHING

## 2018-02-12 ASSESSMENT — PAIN SCALES - GENERAL: PAINLEVEL_OUTOF10: 8

## 2018-02-13 ENCOUNTER — APPOINTMENT (OUTPATIENT)
Dept: GENERAL RADIOLOGY | Age: 83
DRG: 470 | End: 2018-02-13
Payer: MEDICARE

## 2018-02-13 PROBLEM — S72.141A INTERTROCHANTERIC FRACTURE OF RIGHT FEMUR, CLOSED, INITIAL ENCOUNTER (HCC): Status: ACTIVE | Noted: 2018-02-13

## 2018-02-13 LAB — PHENYTOIN LEVEL: 11.9 UG/ML (ref 6–18)

## 2018-02-13 PROCEDURE — 6360000002 HC RX W HCPCS: Performed by: EMERGENCY MEDICINE

## 2018-02-13 PROCEDURE — 6370000000 HC RX 637 (ALT 250 FOR IP): Performed by: ORTHOPAEDIC SURGERY

## 2018-02-13 PROCEDURE — 93010 ELECTROCARDIOGRAM REPORT: CPT | Performed by: INTERNAL MEDICINE

## 2018-02-13 PROCEDURE — 96376 TX/PRO/DX INJ SAME DRUG ADON: CPT

## 2018-02-13 PROCEDURE — 2580000003 HC RX 258: Performed by: ORTHOPAEDIC SURGERY

## 2018-02-13 PROCEDURE — 1200000000 HC SEMI PRIVATE

## 2018-02-13 PROCEDURE — 73560 X-RAY EXAM OF KNEE 1 OR 2: CPT

## 2018-02-13 PROCEDURE — 99222 1ST HOSP IP/OBS MODERATE 55: CPT | Performed by: PHYSICIAN ASSISTANT

## 2018-02-13 RX ORDER — SODIUM CHLORIDE 0.9 % (FLUSH) 0.9 %
10 SYRINGE (ML) INJECTION EVERY 12 HOURS SCHEDULED
Status: DISCONTINUED | OUTPATIENT
Start: 2018-02-13 | End: 2018-02-16 | Stop reason: HOSPADM

## 2018-02-13 RX ORDER — TRAMADOL HYDROCHLORIDE 50 MG/1
50 TABLET ORAL EVERY 6 HOURS PRN
Status: DISCONTINUED | OUTPATIENT
Start: 2018-02-13 | End: 2018-02-16 | Stop reason: HOSPADM

## 2018-02-13 RX ORDER — SODIUM CHLORIDE 0.9 % (FLUSH) 0.9 %
10 SYRINGE (ML) INJECTION PRN
Status: DISCONTINUED | OUTPATIENT
Start: 2018-02-13 | End: 2018-02-16 | Stop reason: HOSPADM

## 2018-02-13 RX ORDER — ONDANSETRON 2 MG/ML
4 INJECTION INTRAMUSCULAR; INTRAVENOUS EVERY 6 HOURS PRN
Status: DISCONTINUED | OUTPATIENT
Start: 2018-02-13 | End: 2018-02-13 | Stop reason: SDUPTHER

## 2018-02-13 RX ORDER — MORPHINE SULFATE 4 MG/ML
4 INJECTION, SOLUTION INTRAMUSCULAR; INTRAVENOUS
Status: DISCONTINUED | OUTPATIENT
Start: 2018-02-13 | End: 2018-02-14

## 2018-02-13 RX ORDER — ACETAMINOPHEN 325 MG/1
650 TABLET ORAL EVERY 4 HOURS PRN
Status: DISCONTINUED | OUTPATIENT
Start: 2018-02-13 | End: 2018-02-16 | Stop reason: HOSPADM

## 2018-02-13 RX ORDER — ONDANSETRON 2 MG/ML
4 INJECTION INTRAMUSCULAR; INTRAVENOUS EVERY 6 HOURS PRN
Status: DISCONTINUED | OUTPATIENT
Start: 2018-02-13 | End: 2018-02-16 | Stop reason: HOSPADM

## 2018-02-13 RX ORDER — ACETAMINOPHEN 325 MG/1
650 TABLET ORAL EVERY 4 HOURS PRN
Status: DISCONTINUED | OUTPATIENT
Start: 2018-02-13 | End: 2018-02-13 | Stop reason: SDUPTHER

## 2018-02-13 RX ORDER — HYDROXYZINE PAMOATE 25 MG/1
25 CAPSULE ORAL 4 TIMES DAILY PRN
Status: DISCONTINUED | OUTPATIENT
Start: 2018-02-13 | End: 2018-02-16 | Stop reason: HOSPADM

## 2018-02-13 RX ORDER — SODIUM CHLORIDE 9 MG/ML
INJECTION, SOLUTION INTRAVENOUS CONTINUOUS
Status: DISCONTINUED | OUTPATIENT
Start: 2018-02-13 | End: 2018-02-16 | Stop reason: HOSPADM

## 2018-02-13 RX ORDER — TRAMADOL HYDROCHLORIDE 50 MG/1
100 TABLET ORAL EVERY 6 HOURS PRN
Status: DISCONTINUED | OUTPATIENT
Start: 2018-02-13 | End: 2018-02-16 | Stop reason: HOSPADM

## 2018-02-13 RX ADMIN — HYDROXYZINE PAMOATE 25 MG: 25 CAPSULE ORAL at 20:56

## 2018-02-13 RX ADMIN — MORPHINE SULFATE 6 MG: 2 INJECTION, SOLUTION INTRAMUSCULAR; INTRAVENOUS at 00:02

## 2018-02-13 RX ADMIN — TRAMADOL HYDROCHLORIDE 50 MG: 50 TABLET, FILM COATED ORAL at 17:33

## 2018-02-13 RX ADMIN — TRAMADOL HYDROCHLORIDE 50 MG: 50 TABLET, FILM COATED ORAL at 15:52

## 2018-02-13 RX ADMIN — SODIUM CHLORIDE: 9 INJECTION, SOLUTION INTRAVENOUS at 23:02

## 2018-02-13 RX ADMIN — SODIUM CHLORIDE: 9 INJECTION, SOLUTION INTRAVENOUS at 08:31

## 2018-02-13 ASSESSMENT — PAIN DESCRIPTION - DESCRIPTORS
DESCRIPTORS: ACHING

## 2018-02-13 ASSESSMENT — PAIN DESCRIPTION - FREQUENCY
FREQUENCY: CONTINUOUS
FREQUENCY: CONTINUOUS

## 2018-02-13 ASSESSMENT — PAIN SCALES - GENERAL
PAINLEVEL_OUTOF10: 5
PAINLEVEL_OUTOF10: 4
PAINLEVEL_OUTOF10: 7
PAINLEVEL_OUTOF10: 7
PAINLEVEL_OUTOF10: 8
PAINLEVEL_OUTOF10: 6
PAINLEVEL_OUTOF10: 8
PAINLEVEL_OUTOF10: 6
PAINLEVEL_OUTOF10: 6
PAINLEVEL_OUTOF10: 4
PAINLEVEL_OUTOF10: 5

## 2018-02-13 ASSESSMENT — PAIN DESCRIPTION - LOCATION
LOCATION: HIP

## 2018-02-13 ASSESSMENT — PAIN DESCRIPTION - PAIN TYPE
TYPE: ACUTE PAIN

## 2018-02-13 ASSESSMENT — PAIN DESCRIPTION - PROGRESSION: CLINICAL_PROGRESSION: NOT CHANGED

## 2018-02-13 ASSESSMENT — PAIN DESCRIPTION - ORIENTATION
ORIENTATION: RIGHT

## 2018-02-13 NOTE — ED NOTES
In to reassess pt and medicate per order. Abbey Rodriguez, med student at bedside to update pt on POC. Pts family remains at bedside. Respirations easy.  Will continue to monitor     Jessica Mcintyre RN  02/13/18 [id number]

## 2018-02-13 NOTE — ED PROVIDER NOTES
[id number]  [phone]            DISCHARGE MEDICATIONS:  New Prescriptions    No medications on file       (Please note that portions of this note were completed with a voice recognition program.  Efforts were made to edit the dictations but occasionally words are mis-transcribed.)    Scribe:  Claire Baeza 2/12/18 11:05 PM Scribing for and in the presence of Mercedez Grier DO. Scribe: Claire Baeza 2/12/18 11:05 PM    Provider:  I personally performed the services described in the documentation, reviewed and edited the documentation which was dictated to the scribe in my presence, and it accurately records my words and actions.     Mercedez Grier DO 2/12/18 1:23 AM       Mercedez Grier DO  02/13/18 [id number]

## 2018-02-13 NOTE — PROGRESS NOTES
Pt arrived in 7k09 COMPLAINTS:right hip pain right hip fracture   IV  int     The best day to schedule a follow up Dr appointment is:  any      The patient is interested in [facility] program?:  no

## 2018-02-13 NOTE — H&P
History and Physical        CHIEF COMPLAINT:  Right hip pain/fall    HISTORY OF PRESENT ILLNESS:      The patient is a 80 y.o. female  who presents with a fall to the ED last night. She has a history of OA and falls over the past couple months. In December she fell stating she injured her right shoulder, but did not follow up with orthopedic care. She lost her balance while walking in the bathroom at home. 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 Pt denies other pains at this time. Cardiology seen pt and is ordering an ECHO. Risks and benefits discussed with pt, pt wishes to precede with surgery tomorrow morning if cleared per cardiology after ECHO. Pt wishes to go home after surgery rather than an ECF. We will evaluate her need for ECF after surgery. Pt noted to have a right intertroch femur fx on x-ray with severe right hip OA. Past Medical History:    Past Medical History:   Diagnosis Date    Fall due to slipping on ice or snow 07/07/2014    History of colon cancer 2004    well diff. andeno    Hyperplastic colon polyp     Irritable bowel syndrome     Osteoarthritis     Seizure disorder ([address]     Seizures ([address] 1969     1988 last seizure    Sprain and strain right ankle       Past Surgical History:    Past Surgical History:   Procedure Laterality Date    CATARACT REMOVAL  2000    right  pajka    CATARACT REMOVAL  2008    left and right    COLECTOMY  2004    low anterior resection    COLONOSCOPY  2011    colon ca 2004    neidich    HYSTERECTOMY  1988    tahbso       Medications Prior to Admission:   Prior to Admission medications    Medication Sig Start Date End Date Taking? Authorizing Provider   DILANTIN 100 MG ER capsule Alternate taking two of the pills on one day and then three of the pills on the next.   Patient taking differently: Take one pill in the morning and one pill at night one day then 2 pills in the morning and 1 at night the next day, (36.8 °C) Oral 79 16 95 % - -   02/13/18 0100 (!) 133/54 - - 78 17 97 % - -   02/13/18 0006 (!) 134/56 - - 86 24 97 % - -   02/12/18 2218 (!) 183/65 97.3 °F (36.3 °C) Oral 78 18 99 % - 146 lb (66.2 kg)     General appearance:  Alert and oriented x 3. No apparent distress, appears stated age and cooperative. HEENT:  Normal cephalic, atraumatic without obvious deformity. Pupils equal, round, and reactive to light. Conjunctivae/corneas clear. Neck: Supple, with full range of motion. No jugular venous distention. Trachea midline. Respiratory:  Normal respiratory effort. Clear to auscultation, bilaterally without Rales/Wheezes/Rhonchi. Cardiovascular:  Regular rate and rhythm. Abdomen: Soft, non-tender, non-distended with normal bowel sounds. Musculoskeletal:  No clubbing, cyanosis or edema bilaterally. Decreased range of motion without deformity right hip. Pt can flex and extend right toes. Skin: Skin color, texture, turgor normal.  No rashes or lesions. Neurologic:  Neurovascularly intact without any focal sensory/motor deficits. Sensation intact.    Psychiatric: Thought content appropriate, normal insight  Capillary Refill: Brisk,< 3 seconds   Peripheral Pulses: +2 palpable, equal bilaterally    DATA:  CBC:   Lab Results   Component Value Date    WBC 6.0 02/12/2018    HGB 13.4 02/12/2018     02/12/2018     BMP:  Lab Results   Component Value Date     02/12/2018    K 4.2 02/12/2018    CL 99 02/12/2018    CO2 23 02/12/2018    BUN 23 02/12/2018    CREATININE 1.0 02/12/2018    CALCIUM 9.5 02/12/2018    GLUCOSE 127 02/12/2018     PT/INR:  No results found for: PROTIME, INR  Troponin:  No results found for: TROPONINI  Recent Labs      02/12/18 2228   LIPASE  74.9*     Recent Labs      02/12/18 2228   AST  13   ALT  10*   BILIDIR  <0.2   BILITOT  0.2*   ALKPHOS  133*       Radiology:  Xr Knee Right (1-2 Views)    Result Date: 2/13/2018  PROCEDURE: XR KNEE RIGHT (1-2 VIEWS) CLINICAL INFORMATION: fall pain, . COMPARISON: Right knee x-ray study October 2, 2012. TECHNIQUE: 4 views of the knee include an AP view, a lateral view and bilateral oblique views. FINDINGS: There is no fracture or dislocation. There is no joint effusion. Joint spacing is difficult to evaluate on these x-rays due to the suboptimal positioning secondary to the known proximal femoral fracture. No suspicious osseous lesions are present. The  soft tissues are normal.     1.  No fractures. 2.  No joint effusion. 3.  The joint space is difficult to evaluate. **This report has been created using voice recognition software. It may contain minor errors which are inherent in voice recognition technology. ** Final report electronically signed by Dr. Reinier Reyes on 2/13/2018 1:06 AM    Xr Hip 2-3 Vw W Pelvis Right    Result Date: 2/12/2018  PROCEDURE: XR HIP 2-3 VW W PELVIS RIGHT CLINICAL INFORMATION: Fall, pain,  . COMPARISON: AP pelvis January 17, 2012. TECHNIQUE: 2 AP views the pelvis. An AP and lateral view of the right hip joint. FINDINGS: The AP view of the pelvis shows the pelvic ring is intact. The proximal left femur looks normal. Postsurgical changes again noted in the pelvis. There is a moderate amount of stool throughout colon. There is further progression of the degenerative changes in the right hip joint with severe sclerosis and hip joint space loss. The osteophytes have increased in size in the subcapital portion of the right femoral head extending out over the right femoral neck. There is deformity of the right femoral head with slight flattening of the superior margin. There is an intertrochanteric fracture with impaction at the fracture site involving the proximal right femur. There is no dislocation of the right femoral head. 1.  Acute appearing impacted intertrochanteric fracture of the proximal right femur without dislocation of the right femoral head.  2.  Severe degenerative changes which have progressed significantly since the older study of January 17, 2012. **This report has been created using voice recognition software. It may contain minor errors which are inherent in voice recognition technology. ** Final report electronically signed by Dr. Lydia Buenrostro on 2/12/2018 11:59 PM      ASSESSMENT:Active Problems:    Intertrochanteric fracture of right femur, closed, initial encounter ([facility]  Resolved Problems:    * No resolved hospital problems. *       PLAN as discussed with Dr. Christine Holguin, due to her severe OA of right hip, will precede with right JADA tomorrow once cleared per cardiology:  1. Regular diet  2. NPO after midnight  3. Obtain consent for right total hip replacement for tomorrow morning  4.  NWB RLE          Electronically signed by Ray Diaz CNP on 2/13/2018 at 3:23 PM

## 2018-02-13 NOTE — PLAN OF CARE
Problem: Pain:  Goal: Pain level will decrease  Pain level will decrease   Outcome: Completed Date Met: 02/13/18    Goal: Control of acute pain  Control of acute pain   Outcome: Ongoing  Pt having pain in the right hip taking iv and oral meds and states this is helping to reach her pain goal of a 5 on scale of 1/10  Goal: Control of chronic pain  Control of chronic pain   Outcome: Completed Date Met: 02/13/18      Problem: Risk for Impaired Skin Integrity  Goal: Tissue integrity - skin and mucous membranes  Structural intactness and normal physiological function of skin and  mucous membranes. Outcome: Ongoing  Bruising to the arms and lower legs are dry. No other evidence of skin breakdown noted    Problem: Cardiovascular  Goal: No DVT, peripheral vascular complications  Outcome: Ongoing  Pt without ss of DVT SCD in place as prevention     Problem: GI  Goal: No bowel complications  Outcome: Ongoing  Abdomen soft active bowel sounds     Problem:   Goal: Adequate urinary output  Outcome: Ongoing  Pt voiding adequate amounts of clear yellow urine    Problem: Musculor/Skeletal Functional Status  Goal: Highest potential functional level  Outcome: Ongoing  Pt is currently bedrest able to assist with turning and repositioning     Problem: DAILY CARE  Goal: Daily care needs are met  Outcome: Ongoing  Moderate amounts of assistance required with adls    Problem: DISCHARGE BARRIERS  Goal: Patient's continuum of care needs are met  Outcome: Ongoing  Pt is from home with  not sure of where she will go at discharge, will possible need rehab    Comments: Care plan reviewed with patient. Patient  verbalize understanding of the plan of care and contribute to goal setting.

## 2018-02-13 NOTE — FLOWSHEET NOTE
Pt was with her  and friend in the room at the time of the visit. She was worried about her surgery and wanted it done today. She was thinking about her  when she can't take care of him. I offered words of encouragement and hope and provided emotional support. She was anointed. Further visit is highly recommended for emotional and prayerful support. 02/13/18 1706   Encounter Summary   Services provided to: Patient and family together   Referral/Consult From: [address] [place] [address] Visiting Yes  (2/13 )   Complexity of Encounter Moderate   Length of Encounter 30 minutes   Spiritual/Muslim   Type Spiritual support   Assessment Approachable;Tearful; Anxious; Fearful   Intervention Prayer;Nurtured hope; Active listening; Anointing;Empowerment;Sustaining presence/ Ministry of presence   Outcome Connection/belonging;Expressed gratitude;Engaged in conversation; Shared life review;Expressed feelings/needs/concerns;Encouraged; Hopeful;Receptive   Sacraments   Sacrament of Sick-Anointing Anointed

## 2018-02-13 NOTE — ED TRIAGE NOTES
Pt presents to ED due to fall this evening. Pt fell in the bathroom on tile and hit her right hip. Pt fell bc she lost balance and patient has a history of neuropathy. Pt denies hitting her head and remembers the fall incident. Vitals noted. Awaiting for provider to assess. Will continue to monitor.

## 2018-02-13 NOTE — CONSULTS
time  Pt without cardiac symptoms  Final risk assessment after echo completed    WOLF De La Fuente-C2:04 PM  2/13/2018

## 2018-02-14 ENCOUNTER — ANESTHESIA (OUTPATIENT)
Dept: OPERATING ROOM | Age: 83
DRG: 470 | End: 2018-02-14
Payer: MEDICARE

## 2018-02-14 ENCOUNTER — APPOINTMENT (OUTPATIENT)
Dept: GENERAL RADIOLOGY | Age: 83
DRG: 470 | End: 2018-02-14
Payer: MEDICARE

## 2018-02-14 ENCOUNTER — ANESTHESIA EVENT (OUTPATIENT)
Dept: OPERATING ROOM | Age: 83
DRG: 470 | End: 2018-02-14
Payer: MEDICARE

## 2018-02-14 VITALS
DIASTOLIC BLOOD PRESSURE: 59 MMHG | OXYGEN SATURATION: 100 % | SYSTOLIC BLOOD PRESSURE: 109 MMHG | RESPIRATION RATE: 1 BRPM

## 2018-02-14 PROBLEM — R03.0 ELEVATED BP WITHOUT DIAGNOSIS OF HYPERTENSION: Status: ACTIVE | Noted: 2018-02-14

## 2018-02-14 LAB
ABO: NORMAL
ANION GAP SERPL CALCULATED.3IONS-SCNC: 14 MEQ/L (ref 8–16)
ANTIBODY SCREEN: NORMAL
BASOPHILS # BLD: 0.3 %
BASOPHILS ABSOLUTE: 0 THOU/MM3 (ref 0–0.1)
BUN BLDV-MCNC: 12 MG/DL (ref 7–22)
CALCIUM SERPL-MCNC: 8.4 MG/DL (ref 8.5–10.5)
CHLORIDE BLD-SCNC: 103 MEQ/L (ref 98–111)
CO2: 23 MEQ/L (ref 23–33)
CREAT SERPL-MCNC: 0.6 MG/DL (ref 0.4–1.2)
EOSINOPHIL # BLD: 0.7 %
EOSINOPHILS ABSOLUTE: 0 THOU/MM3 (ref 0–0.4)
GFR SERPL CREATININE-BSD FRML MDRD: > 90 ML/MIN/1.73M2
GLUCOSE BLD-MCNC: 114 MG/DL (ref 70–108)
HCT VFR BLD CALC: 33.1 % (ref 37–47)
HEMOGLOBIN: 11.3 GM/DL (ref 12–16)
LV EF: 58 %
LVEF MODALITY: NORMAL
LYMPHOCYTES # BLD: 7 %
LYMPHOCYTES ABSOLUTE: 0.4 THOU/MM3 (ref 1–4.8)
MACROCYTES: ABNORMAL
MCH RBC QN AUTO: 35 PG (ref 27–31)
MCHC RBC AUTO-ENTMCNC: 34.1 GM/DL (ref 33–37)
MCV RBC AUTO: 102.7 FL (ref 81–99)
MONOCYTES # BLD: 12.6 %
MONOCYTES ABSOLUTE: 0.8 THOU/MM3 (ref 0.4–1.3)
NUCLEATED RED BLOOD CELLS: 0 /100 WBC
PDW BLD-RTO: 14 % (ref 11.5–14.5)
PLATELET # BLD: 86 THOU/MM3 (ref 130–400)
PMV BLD AUTO: 8 FL (ref 7.4–10.4)
POTASSIUM SERPL-SCNC: 4.1 MEQ/L (ref 3.5–5.2)
RBC # BLD: 3.22 MILL/MM3 (ref 4.2–5.4)
RH FACTOR: NORMAL
SEG NEUTROPHILS: 79.4 %
SEGMENTED NEUTROPHILS ABSOLUTE COUNT: 4.8 THOU/MM3 (ref 1.8–7.7)
SODIUM BLD-SCNC: 140 MEQ/L (ref 135–145)
WBC # BLD: 6 THOU/MM3 (ref 4.8–10.8)

## 2018-02-14 PROCEDURE — 85025 COMPLETE CBC W/AUTO DIFF WBC: CPT

## 2018-02-14 PROCEDURE — 3600000015 HC SURGERY LEVEL 5 ADDTL 15MIN: Performed by: ORTHOPAEDIC SURGERY

## 2018-02-14 PROCEDURE — 2580000003 HC RX 258: Performed by: ANESTHESIOLOGY

## 2018-02-14 PROCEDURE — 7100000000 HC PACU RECOVERY - FIRST 15 MIN: Performed by: ORTHOPAEDIC SURGERY

## 2018-02-14 PROCEDURE — 73502 X-RAY EXAM HIP UNI 2-3 VIEWS: CPT

## 2018-02-14 PROCEDURE — 2500000003 HC RX 250 WO HCPCS: Performed by: ANESTHESIOLOGY

## 2018-02-14 PROCEDURE — 86850 RBC ANTIBODY SCREEN: CPT

## 2018-02-14 PROCEDURE — 6370000000 HC RX 637 (ALT 250 FOR IP): Performed by: NURSE PRACTITIONER

## 2018-02-14 PROCEDURE — 6360000002 HC RX W HCPCS: Performed by: ORTHOPAEDIC SURGERY

## 2018-02-14 PROCEDURE — 6370000000 HC RX 637 (ALT 250 FOR IP): Performed by: ORTHOPAEDIC SURGERY

## 2018-02-14 PROCEDURE — 2580000003 HC RX 258: Performed by: ORTHOPAEDIC SURGERY

## 2018-02-14 PROCEDURE — 7100000001 HC PACU RECOVERY - ADDTL 15 MIN: Performed by: ORTHOPAEDIC SURGERY

## 2018-02-14 PROCEDURE — C1776 JOINT DEVICE (IMPLANTABLE): HCPCS | Performed by: ORTHOPAEDIC SURGERY

## 2018-02-14 PROCEDURE — 86901 BLOOD TYPING SEROLOGIC RH(D): CPT

## 2018-02-14 PROCEDURE — 2720000010 HC SURG SUPPLY STERILE: Performed by: ORTHOPAEDIC SURGERY

## 2018-02-14 PROCEDURE — 80048 BASIC METABOLIC PNL TOTAL CA: CPT

## 2018-02-14 PROCEDURE — 2500000003 HC RX 250 WO HCPCS: Performed by: ORTHOPAEDIC SURGERY

## 2018-02-14 PROCEDURE — 1200000000 HC SEMI PRIVATE

## 2018-02-14 PROCEDURE — 6360000002 HC RX W HCPCS: Performed by: NURSE PRACTITIONER

## 2018-02-14 PROCEDURE — 36415 COLL VENOUS BLD VENIPUNCTURE: CPT

## 2018-02-14 PROCEDURE — 86900 BLOOD TYPING SEROLOGIC ABO: CPT

## 2018-02-14 PROCEDURE — 0SR902Z REPLACEMENT OF RIGHT HIP JOINT WITH METAL ON POLYETHYLENE SYNTHETIC SUBSTITUTE, OPEN APPROACH: ICD-10-PCS | Performed by: ORTHOPAEDIC SURGERY

## 2018-02-14 PROCEDURE — 6360000002 HC RX W HCPCS: Performed by: ANESTHESIOLOGY

## 2018-02-14 PROCEDURE — C1713 ANCHOR/SCREW BN/BN,TIS/BN: HCPCS | Performed by: ORTHOPAEDIC SURGERY

## 2018-02-14 PROCEDURE — 3700000001 HC ADD 15 MINUTES (ANESTHESIA): Performed by: ORTHOPAEDIC SURGERY

## 2018-02-14 PROCEDURE — 99232 SBSQ HOSP IP/OBS MODERATE 35: CPT | Performed by: FAMILY MEDICINE

## 2018-02-14 PROCEDURE — 3600000005 HC SURGERY LEVEL 5 BASE: Performed by: ORTHOPAEDIC SURGERY

## 2018-02-14 PROCEDURE — 3700000000 HC ANESTHESIA ATTENDED CARE: Performed by: ORTHOPAEDIC SURGERY

## 2018-02-14 PROCEDURE — 93306 TTE W/DOPPLER COMPLETE: CPT

## 2018-02-14 DEVICE — REFLECTION SPHERICAL HEAD SCREW 40MM
Type: IMPLANTABLE DEVICE | Site: HIP | Status: FUNCTIONAL
Brand: REFLECTION

## 2018-02-14 DEVICE — REFLECTION INTERFIT THREADED HOLE COVER
Type: IMPLANTABLE DEVICE | Site: HIP | Status: FUNCTIONAL
Brand: REFLECTION

## 2018-02-14 DEVICE — COBALT CHROME 12/14 TAPER FEMORAL                                    HEAD 36MM -3: Type: IMPLANTABLE DEVICE | Site: HIP | Status: FUNCTIONAL

## 2018-02-14 DEVICE — R3 3 HOLE ACETABULAR SHELL 54MM
Type: IMPLANTABLE DEVICE | Site: HIP | Status: FUNCTIONAL
Brand: R3 ACETABULAR

## 2018-02-14 DEVICE — REDAPT 190MM SLEEVELESS REVISION                                    STEM SIZE 20 STANDARD OFFSET
Type: IMPLANTABLE DEVICE | Site: HIP | Status: FUNCTIONAL
Brand: REDAPT

## 2018-02-14 DEVICE — R3 0 DEGREE XLPE ACETABULAR LINER                                    36MM ID X OD 54MM
Type: IMPLANTABLE DEVICE | Site: HIP | Status: FUNCTIONAL
Brand: R3

## 2018-02-14 RX ORDER — KETOROLAC TROMETHAMINE 30 MG/ML
15 INJECTION, SOLUTION INTRAMUSCULAR; INTRAVENOUS EVERY 6 HOURS
Status: DISCONTINUED | OUTPATIENT
Start: 2018-02-14 | End: 2018-02-16 | Stop reason: HOSPADM

## 2018-02-14 RX ORDER — MORPHINE SULFATE 2 MG/ML
2 INJECTION, SOLUTION INTRAMUSCULAR; INTRAVENOUS
Status: ACTIVE | OUTPATIENT
Start: 2018-02-14 | End: 2018-02-15

## 2018-02-14 RX ORDER — MORPHINE SULFATE 4 MG/ML
4 INJECTION, SOLUTION INTRAMUSCULAR; INTRAVENOUS
Status: ACTIVE | OUTPATIENT
Start: 2018-02-14 | End: 2018-02-15

## 2018-02-14 RX ORDER — NEOSTIGMINE METHYLSULFATE 5 MG/5 ML
SYRINGE (ML) INTRAVENOUS PRN
Status: DISCONTINUED | OUTPATIENT
Start: 2018-02-14 | End: 2018-02-14 | Stop reason: SDUPTHER

## 2018-02-14 RX ORDER — TRAMADOL HYDROCHLORIDE 50 MG/1
TABLET ORAL
Qty: 60 TABLET | Refills: 0 | Status: ON HOLD | OUTPATIENT
Start: 2018-02-14 | End: 2018-03-02 | Stop reason: HOSPADM

## 2018-02-14 RX ORDER — PROPOFOL 10 MG/ML
INJECTION, EMULSION INTRAVENOUS PRN
Status: DISCONTINUED | OUTPATIENT
Start: 2018-02-14 | End: 2018-02-14 | Stop reason: SDUPTHER

## 2018-02-14 RX ORDER — LABETALOL HYDROCHLORIDE 5 MG/ML
5 INJECTION, SOLUTION INTRAVENOUS EVERY 10 MIN PRN
Status: DISCONTINUED | OUTPATIENT
Start: 2018-02-14 | End: 2018-02-14 | Stop reason: HOSPADM

## 2018-02-14 RX ORDER — TRANEXAMIC ACID 100 MG/ML
INJECTION, SOLUTION INTRAVENOUS PRN
Status: DISCONTINUED | OUTPATIENT
Start: 2018-02-14 | End: 2018-02-14 | Stop reason: SDUPTHER

## 2018-02-14 RX ORDER — TRANEXAMIC ACID 100 MG/ML
INJECTION, SOLUTION INTRAVENOUS PRN
Status: DISCONTINUED | OUTPATIENT
Start: 2018-02-14 | End: 2018-02-14 | Stop reason: HOSPADM

## 2018-02-14 RX ORDER — ONDANSETRON 2 MG/ML
INJECTION INTRAMUSCULAR; INTRAVENOUS PRN
Status: DISCONTINUED | OUTPATIENT
Start: 2018-02-14 | End: 2018-02-14 | Stop reason: SDUPTHER

## 2018-02-14 RX ORDER — PROMETHAZINE HYDROCHLORIDE 25 MG/ML
12.5 INJECTION, SOLUTION INTRAMUSCULAR; INTRAVENOUS
Status: DISCONTINUED | OUTPATIENT
Start: 2018-02-14 | End: 2018-02-14 | Stop reason: HOSPADM

## 2018-02-14 RX ORDER — MORPHINE SULFATE 0.5 MG/ML
INJECTION, SOLUTION EPIDURAL; INTRATHECAL; INTRAVENOUS PRN
Status: DISCONTINUED | OUTPATIENT
Start: 2018-02-14 | End: 2018-02-14 | Stop reason: HOSPADM

## 2018-02-14 RX ORDER — GLYCOPYRROLATE 1 MG/5 ML
SYRINGE (ML) INTRAVENOUS PRN
Status: DISCONTINUED | OUTPATIENT
Start: 2018-02-14 | End: 2018-02-14 | Stop reason: SDUPTHER

## 2018-02-14 RX ORDER — BUPIVACAINE HYDROCHLORIDE AND EPINEPHRINE 5; 5 MG/ML; UG/ML
INJECTION, SOLUTION EPIDURAL; INTRACAUDAL; PERINEURAL PRN
Status: DISCONTINUED | OUTPATIENT
Start: 2018-02-14 | End: 2018-02-14 | Stop reason: HOSPADM

## 2018-02-14 RX ORDER — ROCURONIUM BROMIDE 10 MG/ML
INJECTION, SOLUTION INTRAVENOUS PRN
Status: DISCONTINUED | OUTPATIENT
Start: 2018-02-14 | End: 2018-02-14 | Stop reason: SDUPTHER

## 2018-02-14 RX ORDER — HYDROMORPHONE HCL 110MG/55ML
PATIENT CONTROLLED ANALGESIA SYRINGE INTRAVENOUS PRN
Status: DISCONTINUED | OUTPATIENT
Start: 2018-02-14 | End: 2018-02-14 | Stop reason: SDUPTHER

## 2018-02-14 RX ORDER — SODIUM CHLORIDE 9 MG/ML
INJECTION, SOLUTION INTRAVENOUS CONTINUOUS PRN
Status: DISCONTINUED | OUTPATIENT
Start: 2018-02-14 | End: 2018-02-14 | Stop reason: SDUPTHER

## 2018-02-14 RX ORDER — LIDOCAINE HYDROCHLORIDE 10 MG/ML
INJECTION, SOLUTION EPIDURAL; INFILTRATION; INTRACAUDAL; PERINEURAL PRN
Status: DISCONTINUED | OUTPATIENT
Start: 2018-02-14 | End: 2018-02-14 | Stop reason: SDUPTHER

## 2018-02-14 RX ORDER — MEPERIDINE HYDROCHLORIDE 25 MG/ML
12.5 INJECTION INTRAMUSCULAR; INTRAVENOUS; SUBCUTANEOUS EVERY 5 MIN PRN
Status: DISCONTINUED | OUTPATIENT
Start: 2018-02-14 | End: 2018-02-14 | Stop reason: HOSPADM

## 2018-02-14 RX ORDER — FENTANYL CITRATE 50 UG/ML
25 INJECTION, SOLUTION INTRAMUSCULAR; INTRAVENOUS EVERY 5 MIN PRN
Status: DISCONTINUED | OUTPATIENT
Start: 2018-02-14 | End: 2018-02-14 | Stop reason: HOSPADM

## 2018-02-14 RX ORDER — METOCLOPRAMIDE HYDROCHLORIDE 5 MG/ML
10 INJECTION INTRAMUSCULAR; INTRAVENOUS
Status: DISCONTINUED | OUTPATIENT
Start: 2018-02-14 | End: 2018-02-14 | Stop reason: HOSPADM

## 2018-02-14 RX ORDER — BACTERIOSTATIC SODIUM CHLORIDE 0.9 %
VIAL (ML) INJECTION PRN
Status: DISCONTINUED | OUTPATIENT
Start: 2018-02-14 | End: 2018-02-14 | Stop reason: HOSPADM

## 2018-02-14 RX ORDER — DIPHENHYDRAMINE HYDROCHLORIDE 50 MG/ML
12.5 INJECTION INTRAMUSCULAR; INTRAVENOUS
Status: DISCONTINUED | OUTPATIENT
Start: 2018-02-14 | End: 2018-02-14 | Stop reason: HOSPADM

## 2018-02-14 RX ORDER — FENTANYL CITRATE 50 UG/ML
50 INJECTION, SOLUTION INTRAMUSCULAR; INTRAVENOUS EVERY 5 MIN PRN
Status: DISCONTINUED | OUTPATIENT
Start: 2018-02-14 | End: 2018-02-14 | Stop reason: HOSPADM

## 2018-02-14 RX ORDER — KETOROLAC TROMETHAMINE 30 MG/ML
INJECTION, SOLUTION INTRAMUSCULAR; INTRAVENOUS PRN
Status: DISCONTINUED | OUTPATIENT
Start: 2018-02-14 | End: 2018-02-14 | Stop reason: HOSPADM

## 2018-02-14 RX ADMIN — TRAMADOL HYDROCHLORIDE 100 MG: 50 TABLET, FILM COATED ORAL at 00:14

## 2018-02-14 RX ADMIN — PROPOFOL 100 MG: 10 INJECTION, EMULSION INTRAVENOUS at 11:08

## 2018-02-14 RX ADMIN — LABETALOL HYDROCHLORIDE 5 MG: 5 INJECTION INTRAVENOUS at 12:20

## 2018-02-14 RX ADMIN — HYDROXYZINE PAMOATE 25 MG: 25 CAPSULE ORAL at 04:31

## 2018-02-14 RX ADMIN — LIDOCAINE HYDROCHLORIDE 5 ML: 10 INJECTION, SOLUTION EPIDURAL; INFILTRATION; INTRACAUDAL; PERINEURAL at 11:08

## 2018-02-14 RX ADMIN — RIVAROXABAN 10 MG: 10 TABLET, FILM COATED ORAL at 17:48

## 2018-02-14 RX ADMIN — TRANEXAMIC ACID 1000 MG: 100 INJECTION, SOLUTION INTRAVENOUS at 11:19

## 2018-02-14 RX ADMIN — CEFAZOLIN SODIUM 2 G: 2 SOLUTION INTRAVENOUS at 10:00

## 2018-02-14 RX ADMIN — SODIUM CHLORIDE: 9 INJECTION, SOLUTION INTRAVENOUS at 17:48

## 2018-02-14 RX ADMIN — TRAMADOL HYDROCHLORIDE 100 MG: 50 TABLET, FILM COATED ORAL at 17:49

## 2018-02-14 RX ADMIN — CEFAZOLIN SODIUM 1 G: 1 INJECTION, SOLUTION INTRAVENOUS at 17:50

## 2018-02-14 RX ADMIN — Medication 10 MG: at 11:26

## 2018-02-14 RX ADMIN — Medication 1 MG: at 11:47

## 2018-02-14 RX ADMIN — Medication 40 MG: at 11:08

## 2018-02-14 RX ADMIN — SODIUM CHLORIDE: 9 INJECTION, SOLUTION INTRAVENOUS at 11:04

## 2018-02-14 RX ADMIN — KETOROLAC TROMETHAMINE 15 MG: 30 INJECTION, SOLUTION INTRAMUSCULAR at 17:49

## 2018-02-14 RX ADMIN — ONDANSETRON 4 MG: 2 INJECTION INTRAMUSCULAR; INTRAVENOUS at 11:48

## 2018-02-14 RX ADMIN — Medication 5 MG: at 11:47

## 2018-02-14 RX ADMIN — TRAMADOL HYDROCHLORIDE 100 MG: 50 TABLET, FILM COATED ORAL at 07:46

## 2018-02-14 RX ADMIN — TRAMADOL HYDROCHLORIDE 100 MG: 50 TABLET, FILM COATED ORAL at 23:55

## 2018-02-14 RX ADMIN — HYDROMORPHONE HYDROCHLORIDE 0.5 MG: 2 INJECTION INTRAMUSCULAR; INTRAVENOUS; SUBCUTANEOUS at 11:08

## 2018-02-14 RX ADMIN — HYDROMORPHONE HYDROCHLORIDE 0.5 MG: 2 INJECTION INTRAMUSCULAR; INTRAVENOUS; SUBCUTANEOUS at 11:26

## 2018-02-14 RX ADMIN — KETOROLAC TROMETHAMINE 15 MG: 30 INJECTION, SOLUTION INTRAMUSCULAR at 23:54

## 2018-02-14 ASSESSMENT — PULMONARY FUNCTION TESTS
PIF_VALUE: 16
PIF_VALUE: 2
PIF_VALUE: 17
PIF_VALUE: 18
PIF_VALUE: 16
PIF_VALUE: 16
PIF_VALUE: 2
PIF_VALUE: 17
PIF_VALUE: 16
PIF_VALUE: 14
PIF_VALUE: 16
PIF_VALUE: 17
PIF_VALUE: 16
PIF_VALUE: 17
PIF_VALUE: 16
PIF_VALUE: 17
PIF_VALUE: 4
PIF_VALUE: 16
PIF_VALUE: 17
PIF_VALUE: 18
PIF_VALUE: 15
PIF_VALUE: 17
PIF_VALUE: 2
PIF_VALUE: 15
PIF_VALUE: 17
PIF_VALUE: 16
PIF_VALUE: 17
PIF_VALUE: 17
PIF_VALUE: 16
PIF_VALUE: 17
PIF_VALUE: 16
PIF_VALUE: 17
PIF_VALUE: 17
PIF_VALUE: 6
PIF_VALUE: 17
PIF_VALUE: 17
PIF_VALUE: 2
PIF_VALUE: 17
PIF_VALUE: 16
PIF_VALUE: 17
PIF_VALUE: 16
PIF_VALUE: 17
PIF_VALUE: 16
PIF_VALUE: 1
PIF_VALUE: 16
PIF_VALUE: 16
PIF_VALUE: 17
PIF_VALUE: 1
PIF_VALUE: 17
PIF_VALUE: 17
PIF_VALUE: 16
PIF_VALUE: 17
PIF_VALUE: 16
PIF_VALUE: 3
PIF_VALUE: 16
PIF_VALUE: 17

## 2018-02-14 ASSESSMENT — PAIN DESCRIPTION - PAIN TYPE
TYPE: ACUTE PAIN
TYPE: ACUTE PAIN
TYPE: SURGICAL PAIN

## 2018-02-14 ASSESSMENT — PAIN DESCRIPTION - PROGRESSION
CLINICAL_PROGRESSION: NOT CHANGED

## 2018-02-14 ASSESSMENT — PAIN DESCRIPTION - LOCATION
LOCATION: HIP

## 2018-02-14 ASSESSMENT — PAIN SCALES - GENERAL
PAINLEVEL_OUTOF10: 8
PAINLEVEL_OUTOF10: 0
PAINLEVEL_OUTOF10: 0
PAINLEVEL_OUTOF10: 9
PAINLEVEL_OUTOF10: 2
PAINLEVEL_OUTOF10: 6
PAINLEVEL_OUTOF10: 2
PAINLEVEL_OUTOF10: 9
PAINLEVEL_OUTOF10: 8
PAINLEVEL_OUTOF10: 8
PAINLEVEL_OUTOF10: 0
PAINLEVEL_OUTOF10: 0
PAINLEVEL_OUTOF10: 1

## 2018-02-14 ASSESSMENT — PAIN DESCRIPTION - ORIENTATION
ORIENTATION: RIGHT

## 2018-02-14 ASSESSMENT — PAIN DESCRIPTION - FREQUENCY
FREQUENCY: CONTINUOUS
FREQUENCY: CONTINUOUS

## 2018-02-14 ASSESSMENT — PAIN DESCRIPTION - DESCRIPTORS
DESCRIPTORS: DISCOMFORT
DESCRIPTORS: SHARP

## 2018-02-14 ASSESSMENT — PAIN DESCRIPTION - DIRECTION: RADIATING_TOWARDS: RIGHT LEG

## 2018-02-14 ASSESSMENT — PAIN DESCRIPTION - ONSET: ONSET: ON-GOING

## 2018-02-14 NOTE — PROGRESS NOTES
1240  Criteria met. Xray done. Labetalol 5mg IV given. Vss 156/68 59 12 Sats 98 percent. Has been pain free. Transported per bed to [facility].

## 2018-02-14 NOTE — PROGRESS NOTES
Orthopedic Surgery      Orthopaedic Attending Note    Patient seen and evaluated     Discussed the current issue ECHO being completed, cardiology to read and clear for surgery    Will precede with surgery today as scheduled if cleared     Electronically signed by Romeo Charles CNP on 2/14/2018 at 9:04 AM

## 2018-02-14 NOTE — PLAN OF CARE
Problem: DISCHARGE BARRIERS  Goal: Patient's continuum of care needs are met  Outcome: Ongoing  Plans home with , home health. Please see progress note 02/14/18.

## 2018-02-14 NOTE — ANESTHESIA POSTPROCEDURE EVALUATION
Department of Anesthesiology  Postprocedure Note    Patient: Keron Nraanjo  MRN: [id number]  YOB: 1934  Date of evaluation: 2/14/2018  Time:  1:50 PM     Procedure Summary     Date:  02/14/18 Room / Location:  [address] [facility]    Anesthesia Start:  1104 Anesthesia Stop:  1212    Procedure:  RIGHT HIP TOTAL ARTHROPLASTY (Right Hip) Diagnosis:  (RIGHT HIP FRACTURE)    Surgeon:  Quincy Yanez MD Responsible Provider:  Corina Webb MD    Anesthesia Type:  general ASA Status:  2          Anesthesia Type: general    Padma Phase I: Padma Score: 8    Padma Phase II:      Last vitals: Reviewed and per EMR flowsheets. Anesthesia Post Evaluation    Patient location during evaluation: PACU  Patient participation: complete - patient participated  Level of consciousness: awake and alert  Airway patency: patent  Nausea & Vomiting: no nausea and no vomiting  Complications: no  Cardiovascular status: hemodynamically stable  Respiratory status: acceptable  Hydration status: euvolemic      [address]  POST-ANESTHESIA NOTE       Name:  Keron Naranjo                                         Age:  80 y.o.   MRN:  [id number]      Last Vitals:  /60   Pulse 58   Temp 97 °F (36.1 °C) (Oral)   Resp 12   Ht 5' 7\" (1.702 m)   Wt 147 lb 8 oz (66.9 kg)   SpO2 95%   BMI 23.10 kg/m²   Patient Vitals for the past 4 hrs:   BP Temp Temp src Pulse Resp SpO2   02/14/18 1322 136/60 - - 58 12 95 %   02/14/18 1310 131/61 97 °F (36.1 °C) Oral 58 12 97 %   02/14/18 1235 (!) 144/65 - - 60 16 -   02/14/18 1230 (!) 152/70 - - 69 11 97 %   02/14/18 1225 (!) 144/65 - - 74 11 94 %   02/14/18 1220 (!) 188/82 - - 89 11 92 %   02/14/18 1215 (!) 204/85 - - 92 14 91 %   02/14/18 1210 (!) 188/92 97.5 °F (36.4 °C) Temporal 91 18 93 %       Level of Consciousness:  Awake    Respiratory:  Stable    Oxygen Saturation:  Stable    Cardiovascular:  Stable    Hydration:  Adequate    PONV:  Stable    Post-op Pain:

## 2018-02-14 NOTE — PROGRESS NOTES
Patient: [facility]  Unit/Bed: 5Y-45/966-R  YOB: 1934  MRN: [id number] Acct: [de-identified]   Admitting Diagnosis: Intertrochanteric fracture of right femur, closed, initial encounter [facility]) Karthik Broad  Intertrochanteric fracture of right femur, closed, initial encounter ([facility] [address] Maty Rodriguez Date:  2/12/2018  Hospital Day: 1    Assessment:     Active Problems:    Partial seizure ([facility] [address]    Irritable bowel syndrome    Drug-induced polyneuropathy ([facility] [address]    Intertrochanteric fracture of right femur, closed, initial encounter ([facility] [address]    Elevated BP without diagnosis of hypertension  Resolved Problems:    * No resolved hospital problems. *      Plan:     Follow the BP for now as her chronic anxiety is intensified  Follow medically        Subjective:     Patient has no complaint of CP, SOB, or GI upset. C/O inability to sleep. Medication side effects: none    Scheduled Meds:   sodium chloride flush  10 mL Intravenous 2 times per day    ceFAZolin  2 g Intravenous On Call to OR     Continuous Infusions:   sodium chloride 75 mL/hr at 02/13/18 2302     PRN Meds:morphine, sodium chloride flush, acetaminophen, ondansetron, traMADol **OR** traMADol, hydrOXYzine    Review of Systems  Pertinent items are noted in HPI. Objective:     No data found. I/O last 3 completed shifts: In: 1308.6 [P.O.:240;  I.V.:1068.6]  Out: 500 [Urine:500]  I/O this shift:  In: -   Out: 350 [Urine:350]    BP (!) 179/78   Pulse 80   Temp 98.4 °F (36.9 °C) (Oral)   Resp 16   Ht 5' 7\" (1.702 m)   Wt 147 lb 8 oz (66.9 kg)   SpO2 95%   BMI 23.10 kg/m²     BP (!) 179/78   Pulse 80   Temp 98.4 °F (36.9 °C) (Oral)   Resp 16   Ht 5' 7\" (1.702 m)   Wt 147 lb 8 oz (66.9 kg)   SpO2 95%   BMI 23.10 kg/m²   General appearance: alert, appears stated age and cooperative  Lungs: clear to auscultation bilaterally  Heart: regular rate and rhythm, S1, S2 normal, no murmur, click, rub or gallop  Abdomen: soft, non-tender; bowel sounds normal; no masses,  no organomegaly  Extremities: extremities normal, atraumatic, no cyanosis or edema  Skin: Skin color, texture, turgor normal. No rashes or lesions  Neurologic: Grossly normal        Data Review:   Results for Josse Alegria (MRN [id number]) as of 2/14/2018 08:34   Ref.  Range 2/12/2018 22:28 2/12/2018 22:47 2/12/2018 23:38 2/13/2018 00:45 2/14/2018 05:57   Sodium Latest Ref Range: 135 - 145 meq/L 137    140   Potassium Latest Ref Range: 3.5 - 5.2 meq/L 4.2    4.1   Chloride Latest Ref Range: 98 - 111 meq/L 99    103   CO2 Latest Ref Range: 23 - 33 meq/L 23    23   BUN Latest Ref Range: 7 - 22 mg/dL 23 (H)    12   Creatinine Latest Ref Range: 0.4 - 1.2 mg/dL 1.0    0.6   Anion Gap Latest Ref Range: 8.0 - 16.0 meq/L 15.0    14.0   Est, Glom Filt Rate Latest Units: ml/min/1.73m2 53 (A)    >90   Magnesium Latest Ref Range: 1.6 - 2.4 mg/dL 2.1       Glucose Latest Ref Range: 70 - 108 mg/dL 127 (H)    114 (H)   Calcium Latest Ref Range: 8.5 - 10.5 mg/dL 9.5    8.4 (L)   Osmolality Calc Latest Ref Range: 275.0 - 300 mOsmol/kg 279.1       Total Protein Latest Ref Range: 6.1 - 8.0 g/dL 6.4       Troponin T Latest Units: ng/ml < 0.010       Albumin Latest Ref Range: 3.5 - 5.1 g/dL 4.0       Alk Phos Latest Ref Range: 38 - 126 U/L 133 (H)       ALT Latest Ref Range: 11 - 66 U/L 10 (L)       AST Latest Ref Range: 5 - 40 U/L 13       Bilirubin Latest Ref Range: 0.3 - 1.2 mg/dL 0.2 (L)       Bilirubin, Direct Latest Ref Range: 0.0 - 0.3 mg/dL <0.2       Lipase Latest Ref Range: 5.6 - 51.3 U/L 74.9 (H)       TSH Latest Ref Range: 0.400 - 4.20 uIU/mL 4.730 (H)       ETHYL ALCOHOL, SERUM Latest Ref Range: 0.00 % < 0.01       AMPHETAMINE+METHAMPHETAMINE URINE SCREEN Latest Ref Range: NEGATIVE   Negative      Barbiturate Quant, Ur Latest Ref Range: NEGATIVE   Negative      Benzodiazepine Quant, Ur Latest Ref Range: NEGATIVE   Negative      Cannabinoid Quant, Ur Latest Ref Range: NEGATIVE   Negative      Cocaine

## 2018-02-14 NOTE — CARE COORDINATION
DISCHARGE BARRIERS  2/14/18, 2:45 PM    Reason for Referral: discharge needs   Mental Status:  Alert, answers questions approrpiately  Decision Making: makes own decisions   Family/Social/Home Environment:  [facility] lives at home with her . Their children live out of state in [place] and [place]. They have been managing their care at home. Her  has [facility]. Friends in the area help with transportation  Current Services: none   Current Equipment: walker   Payment Source: medicare, [address] [facility] secondary  Concerns or Barriers to Discharge:  Plans home with her , will not consider ecf  Collaborative List of ECF/HH were provided: declined, prefers [facility], will not consider ecf     Teach Back Method used with patient regarding care plan and discharge plan  Patient and  verbalize understanding of the plan of care and contribute to goal setting. Anticipated Needs/Discharge Plan:  Spoke with [facility] and her  about discharge plan. She plans to go home at discharge and will not consider ecf for rehab. Her  has health concerns and is not able to help with care. [facility] has been assisting with his care at home. She would like to have [facility] for RN and PT, and plans home, if able.       Electronically signed by NADIRA Higgins on 2/14/2018 at 2:45 PM

## 2018-02-14 NOTE — PROGRESS NOTES
Patient return from pacu Lungs exp. Wheeze upper right and upper left O2 sats 98  3 liters/min via nasal cannula,  diminished abdomen Soft. Iv normal salineinfusing into the forearm left, condition patent and no redness without difficulty with 700 left in a bag hung in recovery. none. Dressing clean, dry and intact  Patient rates pain at 0 on 1-10 scale. Pain management discussed with patient and possible side effects of pain medication information sheet given to patient. Patient oriented to room and call light system and placed within reach.  Family at the bedside

## 2018-02-15 PROBLEM — D62 ACUTE BLOOD LOSS AS CAUSE OF POSTOPERATIVE ANEMIA: Status: ACTIVE | Noted: 2018-02-15

## 2018-02-15 LAB
ANION GAP SERPL CALCULATED.3IONS-SCNC: 9 MEQ/L (ref 8–16)
BASOPHILS # BLD: 0.4 %
BASOPHILS ABSOLUTE: 0 THOU/MM3 (ref 0–0.1)
BUN BLDV-MCNC: 16 MG/DL (ref 7–22)
CALCIUM SERPL-MCNC: 8.2 MG/DL (ref 8.5–10.5)
CHLORIDE BLD-SCNC: 106 MEQ/L (ref 98–111)
CO2: 24 MEQ/L (ref 23–33)
CREAT SERPL-MCNC: 0.7 MG/DL (ref 0.4–1.2)
EOSINOPHIL # BLD: 0.3 %
EOSINOPHILS ABSOLUTE: 0 THOU/MM3 (ref 0–0.4)
GFR SERPL CREATININE-BSD FRML MDRD: 80 ML/MIN/1.73M2
GLUCOSE BLD-MCNC: 116 MG/DL (ref 70–108)
HCT VFR BLD CALC: 25.9 % (ref 37–47)
HEMOGLOBIN: 8.8 GM/DL (ref 12–16)
LYMPHOCYTES # BLD: 4.6 %
LYMPHOCYTES ABSOLUTE: 0.3 THOU/MM3 (ref 1–4.8)
MACROCYTES: ABNORMAL
MCH RBC QN AUTO: 35.3 PG (ref 27–31)
MCHC RBC AUTO-ENTMCNC: 34.1 GM/DL (ref 33–37)
MCV RBC AUTO: 103.3 FL (ref 81–99)
MONOCYTES # BLD: 11.6 %
MONOCYTES ABSOLUTE: 0.8 THOU/MM3 (ref 0.4–1.3)
NUCLEATED RED BLOOD CELLS: 0 /100 WBC
PDW BLD-RTO: 14.1 % (ref 11.5–14.5)
PLATELET # BLD: 83 THOU/MM3 (ref 130–400)
PMV BLD AUTO: 8.1 FL (ref 7.4–10.4)
POTASSIUM SERPL-SCNC: 4.6 MEQ/L (ref 3.5–5.2)
RBC # BLD: 2.51 MILL/MM3 (ref 4.2–5.4)
SEG NEUTROPHILS: 83.1 %
SEGMENTED NEUTROPHILS ABSOLUTE COUNT: 5.9 THOU/MM3 (ref 1.8–7.7)
SODIUM BLD-SCNC: 139 MEQ/L (ref 135–145)
WBC # BLD: 7.1 THOU/MM3 (ref 4.8–10.8)

## 2018-02-15 PROCEDURE — 36415 COLL VENOUS BLD VENIPUNCTURE: CPT

## 2018-02-15 PROCEDURE — 99231 SBSQ HOSP IP/OBS SF/LOW 25: CPT | Performed by: FAMILY MEDICINE

## 2018-02-15 PROCEDURE — 6360000002 HC RX W HCPCS: Performed by: NURSE PRACTITIONER

## 2018-02-15 PROCEDURE — 85025 COMPLETE CBC W/AUTO DIFF WBC: CPT

## 2018-02-15 PROCEDURE — G8979 MOBILITY GOAL STATUS: HCPCS

## 2018-02-15 PROCEDURE — 6370000000 HC RX 637 (ALT 250 FOR IP): Performed by: NURSE PRACTITIONER

## 2018-02-15 PROCEDURE — 1200000000 HC SEMI PRIVATE

## 2018-02-15 PROCEDURE — 2580000003 HC RX 258: Performed by: ORTHOPAEDIC SURGERY

## 2018-02-15 PROCEDURE — G8987 SELF CARE CURRENT STATUS: HCPCS

## 2018-02-15 PROCEDURE — 97530 THERAPEUTIC ACTIVITIES: CPT

## 2018-02-15 PROCEDURE — 97162 PT EVAL MOD COMPLEX 30 MIN: CPT

## 2018-02-15 PROCEDURE — 97110 THERAPEUTIC EXERCISES: CPT

## 2018-02-15 PROCEDURE — 97167 OT EVAL HIGH COMPLEX 60 MIN: CPT

## 2018-02-15 PROCEDURE — 80048 BASIC METABOLIC PNL TOTAL CA: CPT

## 2018-02-15 PROCEDURE — G8978 MOBILITY CURRENT STATUS: HCPCS

## 2018-02-15 PROCEDURE — 6370000000 HC RX 637 (ALT 250 FOR IP): Performed by: ORTHOPAEDIC SURGERY

## 2018-02-15 PROCEDURE — 97535 SELF CARE MNGMENT TRAINING: CPT

## 2018-02-15 PROCEDURE — G8988 SELF CARE GOAL STATUS: HCPCS

## 2018-02-15 PROCEDURE — 99231 SBSQ HOSP IP/OBS SF/LOW 25: CPT | Performed by: PHYSICIAN ASSISTANT

## 2018-02-15 RX ADMIN — KETOROLAC TROMETHAMINE 15 MG: 30 INJECTION, SOLUTION INTRAMUSCULAR at 05:58

## 2018-02-15 RX ADMIN — SODIUM CHLORIDE: 9 INJECTION, SOLUTION INTRAVENOUS at 04:58

## 2018-02-15 RX ADMIN — CEFAZOLIN SODIUM 1 G: 1 INJECTION, SOLUTION INTRAVENOUS at 01:49

## 2018-02-15 RX ADMIN — KETOROLAC TROMETHAMINE 15 MG: 30 INJECTION, SOLUTION INTRAMUSCULAR at 16:02

## 2018-02-15 RX ADMIN — TRAMADOL HYDROCHLORIDE 100 MG: 50 TABLET, FILM COATED ORAL at 05:59

## 2018-02-15 RX ADMIN — RIVAROXABAN 10 MG: 10 TABLET, FILM COATED ORAL at 16:59

## 2018-02-15 RX ADMIN — SODIUM CHLORIDE: 9 INJECTION, SOLUTION INTRAVENOUS at 18:20

## 2018-02-15 ASSESSMENT — PAIN DESCRIPTION - LOCATION
LOCATION: HIP
LOCATION: HIP

## 2018-02-15 ASSESSMENT — PAIN SCALES - GENERAL
PAINLEVEL_OUTOF10: 2
PAINLEVEL_OUTOF10: 0
PAINLEVEL_OUTOF10: 2
PAINLEVEL_OUTOF10: 0
PAINLEVEL_OUTOF10: 3
PAINLEVEL_OUTOF10: 1
PAINLEVEL_OUTOF10: 2

## 2018-02-15 ASSESSMENT — PAIN DESCRIPTION - ORIENTATION
ORIENTATION: RIGHT
ORIENTATION: RIGHT

## 2018-02-15 NOTE — PLAN OF CARE
Problem: Pain:  Goal: Control of acute pain  Control of acute pain   Outcome: Ongoing  Pt report pain at 3 on scale. Pt states oral and IV  medication helping to achieve pain goal of a 0 on scale. Problem: Risk for Impaired Skin Integrity  Goal: Tissue integrity - skin and mucous membranes  Structural intactness and normal physiological function of skin and  mucous membranes. Pt encouraged to turn and repositioned every 2 hours. Pt continent of urine and stool. No new skin issues noted at this time. Problem: Cardiovascular  Goal: No DVT, peripheral vascular complications  Outcome: Ongoing  Pt without s/s of DVT. Pt able to wear odin hose and SCD'S in place to help prevent development of DVT. Problem: GI  Goal: No bowel complications  Outcome: Ongoing  Pt with hypoactive bowel sounds, passing some flatus, and without nausea. Taking prescribed medications to assist with BM    Problem:   Goal: Adequate urinary output  Outcome: Ongoing  Pt voiding adequate amounts without difficulty per self. Problem: Musculor/Skeletal Functional Status  Goal: Highest potential functional level  Outcome: Ongoing  Pt working with PT and Ot. Moderate to extensive assistance required. staff  uses gait belt and front wheel walker to help ambulate this pt. Problem: DAILY CARE  Goal: Daily care needs are met  Outcome: Ongoing  Pt provided assistance with ADL's. Moderate to extensive assistance required with ambulation. Pt able to use call light to ask staff for needs. Problem: DISCHARGE BARRIERS  Goal: Patient's continuum of care needs are met  Outcome: Ongoing  Pt planing home at discharge.  and  to help this pt with discharge needs. Comments: Care plan reviewed with patient. Patient verbalizes understanding of the plan of care and contribute to goal setting.

## 2018-02-15 NOTE — PROGRESS NOTES
thou/mm3 0.0   0.0   Basophils # Latest Ref Range: 0.0 - 0.1 thou/mm3 0.0   0.0   Seg Neutrophils Latest Units: % 79.4   83.1   Segs Absolute Latest Ref Range: 1.8 - 7.7 thou/mm3 4.8   5.9   Lymphocytes Latest Units: % 7.0   4.6   Monocytes Latest Units: % 12.6   11.6   Eosinophils Latest Units: % 0.7   0.3   Basophils Latest Units: % 0.3   0.4   Nucleated Red Blood Cells Latest Units: /100 wbc 0   0   Macrocytes Latest Ref Range: Absent  1+   1+   ABO Unknown A      Rh Factor Unknown POS      Antibody Screen Unknown NEG      XR HIP RIGHT (2-3 VIEWS) Unknown   Rpt    ECHOCARDIOGRAM COMPLETE 2D W DOPPLER W COLOR Unknown  Rpt         Electronically signed by Abbey Vo MD on 2/15/2018 at 9:08 AM

## 2018-02-15 NOTE — PROGRESS NOTES
[facility] - 6Y-84/289-T    Time In: 4537  Time Out: 1324  Timed Code Treatment Minutes: 45 Minutes  Minutes: 38          Date: 2/15/2018  Patient Name: Willem Bean,  Gender:  female        MRN: [id number]  : 1934  (80 y.o.)     Referring Practitioner: Kathe Miles CNP  Diagnosis: intertrochanteric fracture of right femur, closed, initial encounter  Additional Pertinent Hx: Patient is a 80year old female being seen s/p R JADA on 18. Patient has a history of previous falls and on 18 patient fell in bathroom and developed a R intertrochanteric fracture. Patient opted to have a JADA due to severe OA in the hip. Patient is FWB on the RLE. Patient has a history of peripheral neuropathy and anxiety     Past Medical History:   Diagnosis Date    Fall due to slipping on ice or snow 2014    History of colon cancer 2004    well diff. andeno    Hyperplastic colon polyp     Irritable bowel syndrome     Osteoarthritis     Seizures ([facility] [address] 1969 last seizure    Sprain and strain right ankle         Restrictions/Precautions:  Restrictions/Precautions: Weight Bearing, General Precautions, Fall Risk    Right Lower Extremity Weight Bearing: Weight Bearing As Tolerated    Position Activity Restriction  Hip Precautions: No hip flexion > 90 degrees, No ADduction, No hip internal rotation  Other position/activity restrictions: Dr. Eliane Vides THR: no bending >90, no crossing midline, no twisting/pivoring, no IR; O2    Subjective:     Subjective: RN approved session. Patient resting in bed upon arrival and okay'd session. Patient oriented x3. Patient is anxious and fearful of all mobility. Patient requires maximal cueing for active participation. Patient requires additional time for all mobility and MAX cues for motivation. Patient is unrealistic about situation.      Pain:   .5/10 surgical pain, patient able to continue with treatment. States she only has pain with mobility. Social/Functional:  Lives With: Spouse  Type of Home:  (Condo)  Home Layout: One level  Home Access: Level entry  Home Equipment: Rolling walker, Cane     Objective:  Supine to Sit: Maximum assistance (MAXx1 for RLE placement in bed. Constant cues required for patient to initiate mobility. )  Sit to Supine: Maximum assistance (x1, extended time required for all mobility, constant verbal cueing given for sequencing and active participation. MAXx1 for right leg placement )  Scooting: Maximal assistance (to advance hips to EOB. MAX cues given to initiate movement with minimal patient active participation)    Transfers  Sit to Stand: Moderate Assistance (to RW, constant verbal cues for initiation of movement, leg placement and pushing through UE for assistance. Tactile cues required at lumbar spine and chest to stand upright)  Stand to sit: Minimal Assistance (back to bed, patient impulsive with sitting and was not flush against bed before sitting. PT given multiple cues to back up before sitting with patient not listening to cueing)     Ambulation 1  Surface: level tile  Device: Rolling Walker  Other Apparatus: O2 (2L)  Assistance: Moderate assistance  Quality of Gait: kyphotic posture over RW, decreased WB on RLE resulting in slight L lateral lean , unable to clear R foot from ground during stepping and instead pivot/drags foot across ground. increased R knee flexion during stance phase,   Distance: 2 steps forward from bed before patient impulsively sat down  Comments: Patient very anxious and fearful to ambulation. Patient required constant VCs for initiation of movement and use of RW.  Patient was unable to lift RLE to take an appropriate step and impulsively sat down after approximately 2 steps and 4-5 minutes of ambulation    Balance  Posture: Good  Sitting - Static: Good  Sitting - Dynamic: Good  Standing - Static: Fair  Standing - Dynamic:

## 2018-02-15 NOTE — PROGRESS NOTES
safety and indep wtih ADL tasks and decrease fall risk for return home   Performance deficits / Impairments: Decreased functional mobility , Decreased ADL status, Decreased endurance, Decreased balance, Decreased strength, Decreased safe awareness, Decreased high-level IADLs  Prognosis: Good  Discharge Recommendations: Patient would benefit from continued therapy after discharge    Clinical Decision Making: Clinical Decision making was of High Complexity as the result of analysis of data from a comprehensive assessment, the presence of comorbidities affecting the plan of care and the need for signficant modifications or assistance required to complete the evaluation. Patient Education:  Patient Education: OT POC, role of OT, discharge recmoendations, hip precautions, ADLs, transfer training, pt goals    Equipment Recommendations:  Equipment Needed: Yes  Other: LHAE    Safety:  Safety Devices in place: Yes  Type of devices:  All fall risk precautions in place, Gait belt, Left in bed, Call light within reach, Bed alarm in place, Nurse notified    Plan:  Times per week: 6x  Current Treatment Recommendations: Strengthening, Balance Training, Functional Mobility Training, Endurance Training, Patient/Caregiver Education & Training, Self-Care / ADL, Safety Education & Training    Goals:  Patient goals : \" I want to go home\"     Short term goals  Time Frame for Short term goals: 2 weeks  Short term goal 1: Pt will complete functional mobility to/from bathroom with CGA and RW with no vc for safety to increase indep with HH ambulation  Short term goal 2: Pt will complete BADL routine with min A and LHAE with no vc for hip precautions to increase indep with ADL routine  Short term goal 3: Pt will complete BUE strengthening exercises to increase strength and endurance needed for ease with ADLs  Short term goal 4: Pt will complete standing ADL wtih CGA and 1-2 hand release for > 4 minutes to increase indep with cooking tasks  Short term goal 5: Pt will complete functional transfers with CGA and no vc for safety including to/from toilet  Long term goals  Time Frame for Long term goals : No LTG established d/t short ELOS    Evaluation Complexity: Based on the findings of patient history, examination, clinical presentation, and decision making during this evaluation, this patient is of high complexity.     AM-PAC Inpatient Daily Activity Raw Score: 15  AM-PAC Inpatient ADL T-Scale Score : 34.69  ADL Inpatient CMS 0-100% Score: 56.46  ADL Inpatient CMS G-Code Modifier : CK

## 2018-02-15 NOTE — PROGRESS NOTES
[facility]  INPATIENT PHYSICAL THERAPY  EVALUATION  [facility] ORTHOPEDICS 7K    Time In: 9012  Time Out: 0900  Timed Code Treatment Minutes: 24 Minutes  Minutes: 38     Date: 2/15/2018  Patient Name: Paresh Gu,  Gender:  female        MRN: [id number]  : 1934  (80 y.o.)      Referring Practitioner: Ginny Hoyos CNP  Diagnosis: intertrochanteric fracture of right femur, closed, initial encounter  Additional Pertinent Hx: Patient is a 80year old female being seen s/p R JADA on 18. Patient has a history of previous falls and on 18 patient fell in bathroom and developed a R intertrochanteric fracture. Patient opted to have a JADA due to severe OA in the hip. Patient is FWB on the RLE. Patient has a history of peripheral neuropathy and anxiety     Past Medical History:   Diagnosis Date    Fall due to slipping on ice or snow 2014    History of colon cancer 2004    well diff.  andeno    Hyperplastic colon polyp     Irritable bowel syndrome     Osteoarthritis     Seizure disorder ([address]     Seizures ([address] 1969      last seizure    Sprain and strain right ankle     Past Surgical History:   Procedure Laterality Date    CATARACT REMOVAL  2000    right  pajka    CATARACT REMOVAL      left and right    COLECTOMY  2004    low anterior resection    COLONOSCOPY  2011    colon ca 2004    neidich    HYSTERECTOMY  1988    tahbso    SC TOTAL HIP ARTHROPLASTY Right 2018    RIGHT HIP TOTAL ARTHROPLASTY performed by Chemo Enamorado MD at [place]     Restrictions/Precautions:  Restrictions/Precautions: Weight Bearing, General Precautions, Fall Risk    Right Lower Extremity Weight Bearing: Weight Bearing As Tolerated     Position Activity Restriction  Hip Precautions: No hip flexion > 90 degrees, No ADduction, No hip internal rotation  Other position/activity restrictions: Dr. Rick Glez THR: no bending >90, no crossing midline, no twisting/pivoring, no IR; making process of determining plan of care and establishing reasonable expectations for measurable functional outcomes    REQUIRES PT FOLLOW UP: Yes  Discharge Recommendations: Continue to assess pending progress (TCU)    Patient Education:  Patient Education: POC, safety and sequencing, hip precautions, possibility of going to rehab before returning home to improve safety and decrease risk of future falls. Patient not open to idea. Stated she has \"hospital phobia\". Equipment Recommendations:  Equipment Needed: No (continue to monitor patient needs)    Safety:  Type of devices:  All fall risk precautions in place, Call light within reach, Chair alarm in place, Gait belt, Patient at risk for falls, Left in chair  Restraints  Initially in place: No    Plan:  Times per week: 7x BID  Times per day: Twice a day  Specific instructions for Next Treatment: give handout of exercises and restrictions, continue bed mobility, transfer and gait training, BLE strengthening   Current Treatment Recommendations: Strengthening, ROM, Balance Training, Functional Mobility Training, Transfer Training, Gait Training, Endurance Training, Home Exercise Program, Safety Education & Training, Patient/Caregiver Education & Training    Goals:  Patient goals : to go home  Short term goals  Time Frame for Short term goals: 2 weeks  Short term goal 1: Patient will perform bed mobility SBA to decrease risk of pressure ulcers  Short term goal 2: Patient will perform functional transfers SBA to sit in bedside chair  Short term goal 3: Patient will ambulate 95 feet with LRAD and CGA to walk around home safely  Short term goal 4: Patient will perform car transfer CGA to leave hospital safely  Long term goals  Time Frame for Long term goals : NA due to short ELOS    Evaluation Complexity: Based on the findings of patient history, examination, clinical presentation, and decision making during this evaluation, the evaluation of Nara Villanueva  is of

## 2018-02-15 NOTE — OP NOTE
[address] [place] [address]                                 OPERATIVE REPORT    PATIENT NAME: Jeancarlos Almanzar                    :        1934  MED REC NO:   [id number]                           ROOM:       0009  ACCOUNT NO:   [de-identified]                           ADMIT DATE: 2018  PROVIDER:     FREDY Dunaway Exon:  2018    PREOPERATIVE DIAGNOSES:  1. Right closed displaced intertrochanteric femur fracture. 2.  End-stage osteoarthritis, right hip. POSTOPERATIVE DIAGNOSES:  1. Right closed displaced intertrochanteric femur fracture. 2.  End-stage osteoarthritis, right hip. SURGEON:  Taisha Vidal. FREDY [place]:  None. ANESTHESIA:  General.    COMPLICATIONS:  None. PROCEDURE:  Right total hip replacement. IMPLANTS:  [employer] REDAPT stem size 20, -4 neck length, 54 mm cup,  140 mm screw, standard 36 mm liner. INDICATIONS:  The patient is an 80-year-old with history of a fall, pain in  the hip. She has had hip problems before with primary osteoarthritis. I  think it is probably the reason she broke her hips, essentially  arthrodesed. Felt she would benefit from a total hip replacement which  addresses her abductors as necessary. The patient agreed. NARRATIVE:  The patient was taken to the operating room, underwent a  general anesthetic. Placed in the lateral position with the right side up. Care was taken to pad all bony prominences. Time-out was taken. Consent  was confirmed. Did receive preoperative antibiotics in the form of 2 gm of  Ancef. Started with a lateral approach to the hip. Skin knife followed by  electrocautery down the iliotibial band. The iliotibial band was split. Charnley C retractor was put in position. Took down the anterior third of  the gluteus medius tendon. Removed the remaining head and neck.   There was  also some abductors attached to the greater trochanter posteriorly and I  felt that that would be sufficient. We then placed Hohmann's around the  acetabulum, cleaned the soft tissue from the acetabulum, reamed up  sequentially to a size 54, implanted a size 54 cup, 45 degrees of abduction  and 20 degrees of anteversion. Placed 140 mm screw between the tables of  the pelvis, placed a standard 40 mm liner. We did remove anterior osteophytes. We then addressed the femur. Used the . We then initially reamed up to size 20, placed a size  20 REDAPT stem, appropriate version. Trialed head and neck lengths with a  -4 neck length. Limb lengths appeared to be appropriate. She is a little  tight and I think she had been short for a while, but we got our lengths  back at this point. Placed an implantable -4 cobalt chrome head, stable  throughout our range of motion. Limb lengths appeared to be appropriate. Wound was thoroughly irrigated. Repaired the abductor and capsule back  with #5 Ethibond through bone tunnels. Iliotibial band repaired with #2  Brooks Vadim. We did inject the area with Marcaine with epinephrine. Toradol and  morphine for postop pain control as well as hemostasis. Repaired the skin  with 0 Quill, Dermabond, and dry dressings. The patient was then awakened  and returned to the recovery room in good condition. POSTOPERATIVE PLAN:  Weightbearing as tolerated. Total hip arthroplasty  protocol. First postop visit will be a clinical exam, no x-rays, and that  will be at 8 weeks.         Gilmer Stewart M.D.    D: 02/14/2018 12:48:25       T: 02/14/2018 14:32:06     [email or web address]  Job#: [id number]     Doc#: [id number]    CC:

## 2018-02-15 NOTE — PLAN OF CARE
Problem: Pain:  Goal: Control of acute pain  Control of acute pain   Outcome: Ongoing  Pt taking ultram for pain control     Problem: Cardiovascular  Goal: No DVT, peripheral vascular complications  Outcome: Ongoing  Pt on xarelto     Problem: GI  Goal: No bowel complications  Outcome: Ongoing  Pt has active bowel sounds    Problem:   Goal: Adequate urinary output  Outcome: Ongoing  Pt urinating adequately     Problem: Musculor/Skeletal Functional Status  Goal: Highest potential functional level  Outcome: Ongoing  Pt has limited mobility due to right hip replacement surgery    Problem: DAILY CARE  Goal: Daily care needs are met  Outcome: Ongoing  Pt care needs are met as they arise     Problem: DISCHARGE BARRIERS  Goal: Patient's continuum of care needs are met  Outcome: Ongoing  Pt discharge plans are in progress. Pt is refusing ECF    Care plan reviewed with patient. Patient verbalizes understanding of the plan of care and contribute to goal setting.

## 2018-02-15 NOTE — PROGRESS NOTES
appropriate, normal insight  Capillary Refill: Brisk,< 3 seconds   Peripheral Pulses: +2 palpable, equal bilaterally    Data  CBC:   Lab Results   Component Value Date    WBC 7.1 02/15/2018    HGB 8.8 02/15/2018    PLT 83 02/15/2018     BMP:  Lab Results   Component Value Date     02/15/2018    K 4.6 02/15/2018     02/15/2018    CO2 24 02/15/2018    BUN 16 02/15/2018    CREATININE 0.7 02/15/2018    CALCIUM 8.2 02/15/2018    GLUCOSE 116 02/15/2018     Uric Acid:  No components found for: URIC  PT/INR:  No results found for: PROTIME, INR  Troponin:  No results found for: TROPONINI  Urine Culture:  No components found for: OTONIEL    ASSESSMENT:  C/Javier Cox 1106 Problems    Diagnosis Date Noted    Elevated BP without diagnosis of hypertension [R03.0] 02/14/2018    Closed displaced intertrochanteric fracture of right femur ([facility] [address] [S72.141A] 02/13/2018    Drug-induced polyneuropathy ([address] [G62.0] 09/23/2016    Partial seizure ([address] [R56.9]     Irritable bowel syndrome [K58.9]        PLAN  1. Dry drsg changes as needed   2. WBAT RLE  3. PT/OT to eval and treat   4.  Continue current medical management         Electronically signed by Edmond Hughes CNP on 2/15/2018 at 8:21 AM

## 2018-02-16 ENCOUNTER — HOSPITAL ENCOUNTER (INPATIENT)
Age: 83
LOS: 14 days | Discharge: HOME HEALTH | DRG: 536 | End: 2018-03-02
Attending: PHYSICAL MEDICINE & REHABILITATION | Admitting: PHYSICAL MEDICINE & REHABILITATION
Payer: MEDICARE

## 2018-02-16 VITALS
DIASTOLIC BLOOD PRESSURE: 66 MMHG | WEIGHT: 147.5 LBS | HEART RATE: 102 BPM | RESPIRATION RATE: 18 BRPM | HEIGHT: 67 IN | SYSTOLIC BLOOD PRESSURE: 131 MMHG | BODY MASS INDEX: 23.15 KG/M2 | TEMPERATURE: 98 F | OXYGEN SATURATION: 99 %

## 2018-02-16 DIAGNOSIS — Z96.641 STATUS POST TOTAL REPLACEMENT OF RIGHT HIP: Primary | ICD-10-CM

## 2018-02-16 LAB
EKG ATRIAL RATE: 101 BPM
EKG P AXIS: 72 DEGREES
EKG P-R INTERVAL: 154 MS
EKG Q-T INTERVAL: 352 MS
EKG QRS DURATION: 132 MS
EKG QTC CALCULATION (BAZETT): 456 MS
EKG R AXIS: -13 DEGREES
EKG T AXIS: 108 DEGREES
EKG VENTRICULAR RATE: 101 BPM

## 2018-02-16 PROCEDURE — 6370000000 HC RX 637 (ALT 250 FOR IP): Performed by: NURSE PRACTITIONER

## 2018-02-16 PROCEDURE — 6360000002 HC RX W HCPCS: Performed by: ORTHOPAEDIC SURGERY

## 2018-02-16 PROCEDURE — 6370000000 HC RX 637 (ALT 250 FOR IP): Performed by: ORTHOPAEDIC SURGERY

## 2018-02-16 PROCEDURE — 97116 GAIT TRAINING THERAPY: CPT

## 2018-02-16 PROCEDURE — 93005 ELECTROCARDIOGRAM TRACING: CPT | Performed by: PHYSICAL MEDICINE & REHABILITATION

## 2018-02-16 PROCEDURE — 2580000003 HC RX 258: Performed by: ORTHOPAEDIC SURGERY

## 2018-02-16 PROCEDURE — 6360000002 HC RX W HCPCS: Performed by: NURSE PRACTITIONER

## 2018-02-16 PROCEDURE — 97110 THERAPEUTIC EXERCISES: CPT

## 2018-02-16 PROCEDURE — 2700000000 HC OXYGEN THERAPY PER DAY

## 2018-02-16 PROCEDURE — 1180000000 HC REHAB R&B

## 2018-02-16 RX ORDER — PHENYTOIN SODIUM 100 MG/1
100 CAPSULE, EXTENDED RELEASE ORAL 2 TIMES DAILY
Status: DISCONTINUED | OUTPATIENT
Start: 2018-02-16 | End: 2018-03-02 | Stop reason: HOSPADM

## 2018-02-16 RX ORDER — ACETAMINOPHEN 325 MG/1
650 TABLET ORAL EVERY 4 HOURS PRN
Status: DISCONTINUED | OUTPATIENT
Start: 2018-02-16 | End: 2018-02-20

## 2018-02-16 RX ORDER — DOCUSATE SODIUM 100 MG/1
100 CAPSULE, LIQUID FILLED ORAL 2 TIMES DAILY
Status: DISCONTINUED | OUTPATIENT
Start: 2018-02-16 | End: 2018-02-17

## 2018-02-16 RX ORDER — POLYETHYLENE GLYCOL 3350 17 G/17G
17 POWDER, FOR SOLUTION ORAL DAILY PRN
Status: DISCONTINUED | OUTPATIENT
Start: 2018-02-16 | End: 2018-02-17

## 2018-02-16 RX ORDER — TRAMADOL HYDROCHLORIDE 50 MG/1
50 TABLET ORAL EVERY 6 HOURS PRN
Status: CANCELLED | OUTPATIENT
Start: 2018-02-16

## 2018-02-16 RX ORDER — PHENYTOIN SODIUM 100 MG/1
100 CAPSULE, EXTENDED RELEASE ORAL 2 TIMES DAILY
Status: DISCONTINUED | OUTPATIENT
Start: 2018-02-16 | End: 2018-02-16 | Stop reason: HOSPADM

## 2018-02-16 RX ORDER — TRAMADOL HYDROCHLORIDE 50 MG/1
100 TABLET ORAL EVERY 6 HOURS PRN
Status: CANCELLED | OUTPATIENT
Start: 2018-02-16

## 2018-02-16 RX ORDER — PHENYTOIN SODIUM 100 MG/1
100 CAPSULE, EXTENDED RELEASE ORAL EVERY OTHER DAY
Status: DISCONTINUED | OUTPATIENT
Start: 2018-02-17 | End: 2018-03-02 | Stop reason: HOSPADM

## 2018-02-16 RX ORDER — PHENYTOIN SODIUM 100 MG/1
100 CAPSULE, EXTENDED RELEASE ORAL EVERY OTHER DAY
Status: CANCELLED | OUTPATIENT
Start: 2018-02-16

## 2018-02-16 RX ORDER — PHENYTOIN SODIUM 100 MG/1
100 CAPSULE, EXTENDED RELEASE ORAL 2 TIMES DAILY
Status: CANCELLED | OUTPATIENT
Start: 2018-02-16

## 2018-02-16 RX ORDER — ACETAMINOPHEN 325 MG/1
650 TABLET ORAL EVERY 4 HOURS PRN
Status: CANCELLED | OUTPATIENT
Start: 2018-02-16

## 2018-02-16 RX ORDER — SENNA PLUS 8.6 MG/1
2 TABLET ORAL NIGHTLY
Status: DISCONTINUED | OUTPATIENT
Start: 2018-02-16 | End: 2018-02-17

## 2018-02-16 RX ORDER — TRAMADOL HYDROCHLORIDE 50 MG/1
100 TABLET ORAL EVERY 6 HOURS PRN
Status: DISCONTINUED | OUTPATIENT
Start: 2018-02-16 | End: 2018-02-22

## 2018-02-16 RX ORDER — PHENYTOIN SODIUM 100 MG/1
100 CAPSULE, EXTENDED RELEASE ORAL EVERY OTHER DAY
Status: DISCONTINUED | OUTPATIENT
Start: 2018-02-16 | End: 2018-02-16 | Stop reason: HOSPADM

## 2018-02-16 RX ORDER — TRAMADOL HYDROCHLORIDE 50 MG/1
50 TABLET ORAL EVERY 6 HOURS PRN
Status: DISCONTINUED | OUTPATIENT
Start: 2018-02-16 | End: 2018-02-22

## 2018-02-16 RX ORDER — HYDROXYZINE PAMOATE 25 MG/1
25 CAPSULE ORAL 4 TIMES DAILY PRN
Status: DISCONTINUED | OUTPATIENT
Start: 2018-02-16 | End: 2018-03-01

## 2018-02-16 RX ORDER — HYDROXYZINE PAMOATE 25 MG/1
25 CAPSULE ORAL 4 TIMES DAILY PRN
Status: CANCELLED | OUTPATIENT
Start: 2018-02-16

## 2018-02-16 RX ADMIN — ENOXAPARIN SODIUM 30 MG: 30 INJECTION SUBCUTANEOUS at 20:56

## 2018-02-16 RX ADMIN — SODIUM CHLORIDE: 9 INJECTION, SOLUTION INTRAVENOUS at 08:15

## 2018-02-16 RX ADMIN — PHENYTOIN SODIUM 100 MG: 100 CAPSULE ORAL at 14:18

## 2018-02-16 RX ADMIN — PHENYTOIN SODIUM 100 MG: 100 CAPSULE ORAL at 20:56

## 2018-02-16 RX ADMIN — ACETAMINOPHEN 650 MG: 325 TABLET ORAL at 00:02

## 2018-02-16 RX ADMIN — KETOROLAC TROMETHAMINE 15 MG: 30 INJECTION, SOLUTION INTRAMUSCULAR at 05:57

## 2018-02-16 RX ADMIN — KETOROLAC TROMETHAMINE 15 MG: 30 INJECTION, SOLUTION INTRAMUSCULAR at 13:11

## 2018-02-16 RX ADMIN — KETOROLAC TROMETHAMINE 15 MG: 30 INJECTION, SOLUTION INTRAMUSCULAR at 00:01

## 2018-02-16 ASSESSMENT — PAIN SCALES - GENERAL
PAINLEVEL_OUTOF10: 3
PAINLEVEL_OUTOF10: 1
PAINLEVEL_OUTOF10: 0
PAINLEVEL_OUTOF10: 1
PAINLEVEL_OUTOF10: 0
PAINLEVEL_OUTOF10: 0

## 2018-02-16 ASSESSMENT — PAIN DESCRIPTION - ORIENTATION: ORIENTATION: RIGHT

## 2018-02-16 ASSESSMENT — PAIN DESCRIPTION - PAIN TYPE: TYPE: SURGICAL PAIN

## 2018-02-16 ASSESSMENT — PAIN DESCRIPTION - LOCATION: LOCATION: HIP

## 2018-02-16 NOTE — PROGRESS NOTES
Orthopaedic Progress Note      SUBJECTIVE   Ms. Severino Sanchez is post op day # 2 s/p right JADA  Pt working with therapy needs additional assistance, unsafe to go home and take care of her . Pt accepted to rehab, may transfer today. Allergies: Allergies as of 02/12/2018 - Review Complete 02/12/2018   Allergen Reaction Noted    Cefuroxime axetil  05/03/2012    Sulfa antibiotics  05/03/2012     Current Inpatient Medications:  Current Facility-Administered Medications: ketorolac (TORADOL) injection 15 mg, 15 mg, Intravenous, Q6H  rivaroxaban (XARELTO) tablet 10 mg, 10 mg, Oral, Daily  0.9 % sodium chloride infusion, , Intravenous, Continuous  sodium chloride flush 0.9 % injection 10 mL, 10 mL, Intravenous, 2 times per day  sodium chloride flush 0.9 % injection 10 mL, 10 mL, Intravenous, PRN  acetaminophen (TYLENOL) tablet 650 mg, 650 mg, Oral, Q4H PRN  ondansetron (ZOFRAN) injection 4 mg, 4 mg, Intravenous, Q6H PRN  traMADol (ULTRAM) tablet 50 mg, 50 mg, Oral, Q6H PRN **OR** traMADol (ULTRAM) tablet 100 mg, 100 mg, Oral, Q6H PRN  hydrOXYzine (VISTARIL) capsule 25 mg, 25 mg, Oral, 4x Daily PRN    REVIEW OF SYSTEMS:  Constitutional: Denies any fever, chills. Derm: Denies any rash or skin color change. Eyes: Denies blurred or decreased in vision. Ent: Denies any tinnitus or vertigo. Resp: Denies any cough or shortness of breath. CV: Denies any syncope, palpitations or chest pain. GI:  Denies any abdominal pain, nausea, vomiting, constipation or diarrhea. : Denies any hematuria, hesitancy, or dysuria. Heme/Lymph: Denies any bleeding. Musculoskeletal: Denies numbness and tingling RLE, pain right hip  Neuro: Denies any dizziness, paresthesia or weakness.     OBJECTIVE    Patient Vitals for the past 24 hrs:   BP Temp Temp src Pulse Resp SpO2   02/16/18 0745 (!) 148/65 98 °F (36.7 °C) Oral 96 18 96 %   02/16/18 0415 (!) 125/59 97.6 °F (36.4 °C) Oral 94 18 94 %   02/15/18 2355 132/68 99.1 °F (37.3 °C) Oral 106 18 92 %   02/15/18 2040 (!) 124/58 99.4 °F (37.4 °C) Oral 106 18 96 %   02/15/18 1645 (!) 126/58 100.2 °F (37.9 °C) Oral 120 18 94 %   02/15/18 1430 (!) 153/60 99.2 °F (37.3 °C) Oral 108 18 97 %     INTAKE/OUTPUT:    Intake/Output Summary (Last 24 hours) at 02/16/18 0902  Last data filed at 02/16/18 0437   Gross per 24 hour   Intake          2666.32 ml   Output              200 ml   Net          2466.32 ml     I/O last 3 completed shifts: In: 2666.3 [P.O.:810; I.V.:1856.3]  Out: 200 [Urine:200]    PHYSICAL EXAM:  General appearance:  Alert and oriented x 3. No apparent distress, appears stated age and cooperative. HEENT:  Normal cephalic, atraumatic without obvious deformity. Pupils equal, round, and reactive to light. Conjunctivae/corneas clear. Neck: Supple, with full range of motion. No jugular venous distention. Trachea midline. Respiratory:  Normal respiratory effort. Clear to auscultation, bilaterally without Rales/Wheezes/Rhonchi. Cardiovascular:  Regular rate and rhythm. Abdomen: Soft, non-tender, non-distended. Musculoskeletal: No clubbing or cyanosis bilaterally. Pt can flex and extend right toes. Right hip drsg dry and intact. Edema noted to right hip. Skin: Skin color, texture, turgor normal.  No rashes or lesions. Neurologic:  Neurovascularly intact without any focal sensory/motor deficits. Sensation intact.    Psychiatric: Thought content appropriate, normal insight  Capillary Refill: Brisk,< 3 seconds   Peripheral Pulses: +2 palpable, equal bilaterally    Data  CBC:   Lab Results   Component Value Date    WBC 7.1 02/15/2018    HGB 8.8 02/15/2018    PLT 83 02/15/2018     BMP:  Lab Results   Component Value Date     02/15/2018    K 4.6 02/15/2018     02/15/2018    CO2 24 02/15/2018    BUN 16 02/15/2018    CREATININE 0.7 02/15/2018    CALCIUM 8.2 02/15/2018    GLUCOSE 116 02/15/2018     Uric Acid:  No components found for: URIC  PT/INR:  No results found for: PROTIME,

## 2018-02-16 NOTE — PROGRESS NOTES
Nutrition Assessment    Type and Reason for Visit: Initial, Positive Nutrition Screen, Consult (Weight Loss/Decreased Appeite/New Admit)    Nutrition Recommendations: Consider starting a MVI w/minerals daily to aid in wound healing. Started Ensure High Protein TID. Malnutrition Assessment:  · Malnutrition Status: No malnutrition    Nutrition Diagnosis:   · Problem: Increased nutrient needs  · Etiology: related to Increased demand for energy/nutrients due to (wound healing)     Signs and symptoms:  as evidenced by Presence of wounds    Nutrition Assessment:  · Subjective Assessment: Pt s/p right total hip arthroplasty on 2/14. Pt seen- she reports good appetite and intake of most meals at present and PTA. Pt reports she is not a big eater. She states she usually consumes OJ, coffee, and a doughnut for breakfast, yogurt fdr lunch, and leftovers for dinner. Pt denies N/V. Last BM was on 2/13 per pt report. Pt reports her UBW ~145 lb. Curent weight is up from UBW. No weight loss x11 months per chart review. Pt is amenable to consume Ensure High protein during LOS.   · Wound Type: Surgical Wound (s/p R. total hip arthroplasty on 2/14)  · Current Nutrition Therapies:  · Oral Diet Orders: General   · Oral Diet intake: 51-75%  · Oral Nutrition Supplement (ONS) Orders: Low Calorie, High Protein (Ensure High Protein TID- Chocolate with meals)  · ONS intake:  (Initiated today)  · Anthropometric Measures:  · Ht: 5' 7\" (170.2 cm)   · Current Body Wt: 160 lb 1.6 oz (72.6 kg) (2/16; +2 non-pitting edema BLE)  · Admission Body Wt: 147 lb 8 oz (66.9 kg) (2/12; +2 non-pitting edmea BLE)  · Usual Body Wt: 145 lb (65.8 kg) (per pt report; 147 lb 8 oz on 2/12/18)  · % Weight Change: no weight loss ,  x11 months per chart review  · Ideal Body Wt: 135 lb (61.2 kg), % Ideal Body 109%  · BMI Classification: BMI 25.0 - 29.9 Overweight (25.1)  · Comparative Standards (Estimated Nutrition Needs):  · Estimated Daily Total Kcal: 7979

## 2018-02-16 NOTE — PROGRESS NOTES
[address] [address]  Acute Inpatient Rehab Preadmission Assessment    Patient Name: Azeb Middleton        MRN: [id number]    : 1934  (80 y.o.)  Gender: female     Admitted from:[address]  Initial Assessment    Date of admission to the hospital: 2018 10:12 PM  Date patient eligible for admission:2018    Primary Diagnosis:  RIGHT hip Fracture S/P repair      Did patient have surgery? yes - Right total hip replacement    Physicians: Haritha Russ MD, Dr. Wiliam Mckenna, Dr. Rosana Ryan, Dr. Sabiha Barnhart for clinical complications/co-morbidities:   Past Medical History:   Diagnosis Date    Fall due to slipping on ice or snow 2014    History of colon cancer 2004    well diff. [place]eno    Hyperplastic colon polyp     Irritable bowel syndrome     Osteoarthritis     Seizures ([facility] [address] 1969 last seizure    Sprain and strain right ankle       Financial Information  Primary insurance: Medicare    Secondary Insurance:  [facility]    Has the patient had two or more falls in the past year or any fall with injury in the past year? yes    Did the patient have major surgery during the 100 days prior to admission?   yes    Precautions: Restrictions/Precautions: Weight Bearing, General Precautions, Fall Risk  Hip Precautions: No hip flexion > 90 degrees, No ADduction, No hip internal rotation  Other position/activity restrictions: Dr. Angelita Reinoso THR: no bending >90, no crossing midline, no twisting/pivoring, no IR; O2  Right Lower Extremity Weight Bearing: Weight Bearing As Tolerated      Isolation Precautions: None       Physiatrist: Dr. Wiliam Mckenna    Patients Occupation: Retired    Reviewed Lab and Diagnostic reports from Current Admission: Yes    Patients Prior Functional  Level: Prior Function  Lives With: Spouse  Receives Help From: Neighbor ( is in poor health recently, states neighbor assists when needed)  ADL Assistance: Independent  Homemaking Assistance: Independent  Ambulation Assistance: Independent  Transfer Assistance: Independent  Additional Comments: Patient states she is I with all ADLs. States up until her 's recent hospitalization, he was doing all the grocery shopping and laundry as his \"exercise\". Patient states she is able to do these activities if necessary. Patient does not wear O2 at home. Patient's  currently has [facility] services. Current functional status for upper extremity ADLs Minimal assistance     Current functional status for lower extremity ADLs  Moderate assistance     Current functional status for bed, chair, wheelchair transfers  Transfers  Sit to Stand: Moderate Assistance (to RW, constant verbal cues for initiation of movement, leg placement and pushing through UE for assistance. Tactile cues required at lumbar spine and chest to stand upright)  Stand to sit: Minimal Assistance (back to bed, patient impulsive with sitting and was not flush against bed before sitting. PT given multiple cues to back up before sitting with patient not listening to cueing)    Current functional status for toilet transfers  Toilet Transfer: Minimal assistance    Current functional status for locomotion Ambulation 1 Surface: level tile Device: Rolling Walker Other Apparatus: O2 (2L) Assistance: Moderate assistance Quality of Gait: kyphotic posture over RW, decreased WB on RLE resulting in slight L lateral lean , unable to clear R foot from ground during stepping and instead pivot/drags foot across ground. increased R knee flexion during stance phase, Distance: 2 steps forward from bed before patient impulsively sat down Comments: Patient very anxious and fearful to ambulation. Patient required constant VCs for initiation of movement and use of RW.  Patient was unable to lift RLE to take an appropriate step and impulsively sat down after approximately 2 steps and 4-5 minutes of ambulation    Current functional status for bladder management:

## 2018-02-16 NOTE — CARE COORDINATION
2/16/18, 9:52 AM    Discharge plan discussed by  and . Discharge plan reviewed with patient/ family. Patient/ family verbalize understanding of discharge plan and are in agreement with plan. Understanding was demonstrated using the teach back method.    Services After Discharge  Services At/After Discharge: PT, OT, Nursing Services, Skilled Therapy   IMM Letter  IMM Letter given to Patient/Family/Significant other/Guardian/POA/by[de-identified] cm  IMM Letter date given[de-identified] 02/16/18  IMM Letter time given[de-identified] 0900     To [facility] [id number] today

## 2018-02-16 NOTE — CONSULTS
Physical Medicine and Rehabilitation Consult Note     Gely Dan  [id number]    Reason for Consult: evaluation for rehabilitation needs s/p RIGHT JADA    Impression/Recommendations:     Impression:  1. S/p RIGHT total hip arthroplasty by Dr. Suresh Kidd on 02/14/2018.  2. Fall closed displaced intertrochanteric femur fracture. 3. End-stage osteoarthritis,RIGHT hip. 4. Falls. 5. Gait instability. 6. RIGHT shoulder pain. 7. Chronic LBBB 2004. 8. Seizures on chronic dilantin. Last in 1988. 9. Drug induced polyneuropathy. Recommendations:  Patient is a good candidate for IPR and is agreeable to do so. Continue PT/OT. Can transfer once medically stable. It was my pleasure to evaluate Gely Dan today. Please call with questions. Sonja Perez MD       History:   History of Present Illness:  Gely Dan is a 80 y.o. female who  has a past medical history of Fall due to slipping on ice or snow; History of colon cancer; Hyperplastic colon polyp; Irritable bowel syndrome; Osteoarthritis; Seizures ([address] and Sprain and strain. She was admitted 2/12/2018 after a ground level where she lost her balance while walking into her bathroom. She fell on to her RIGHT side and was unable to stand afterwards, and crawled to the hallway to get help from her . She had no LOC with this fall. Imaging showed an acute appearing impacted intertrochanteric fracture of the proximal RIGHT femur without dislocation of the RIGHT femoral head. She underwent a RIGHT total hip arthroplasty by Dr. Suresh Kidd on 02/14/2018. She did have a fall two months ago which resulted in RIGHT shoulder pain, which has been persistent since her fall. She was initially referred to [facility] following her fall in December, but did not follow up. On initial consultation exam she states her pain is 0/10 at rest and no number given for movement with therapy.   She is currently only tramadol for pain control and is controlled with this fall\"    OT:  Objective  Cognition Comment: pt with decreased insight and safety awareness, high anxiety levels with pt having difficulty attending to task, would get easily distracted       Sensation  Overall Sensation Status: WFL                                                                 LUE AROM (degrees)  LUE AROM : WFL      RUE AROM (degrees)  RUE General AROM: 90 degrees shoulder flexion, WFL distally.  pt recently hurt R shoulder,       LUE Strength  LUE Strength Comment: 4/5 grossly       RUE Strength  RUE Strength Comment: 3-/5 shoulder, 3/5 distally       RUE Tone: Normotonic  LUE Tone: Normotonic      ADL  Toileting: Maximum assistance (clothing management)      Bed mobility  Sit to Supine: Maximum assistance (X2, extneded time needed d/t pt requesting to go slow and rest during process )     Transfers  Sit to stand: Minimal assistance (from recliner, max cues for positioning and extra time needed, pt unsteady during transfer)  Stand to sit: Minimal assistance (to BSC, EOB, vc for safety and assist to control descent)  Toilet Transfers  Equipment Used: Standard bedside commode  Toilet Transfer: Minimal assistance  Toilet Transfers Comments: vc for technique     Balance  Sitting Balance: Stand by assistance  Standing Balance: Minimal assistance (- progressed to CGA)      Time: 1 minute X 2 events prep for mobility      Functional Mobility  Functional - Mobility Device: Rolling Walker  Activity: Other  Assist Level: Contact guard assistance  Functional Mobility Comments: from recliner to BSC X 5 feet, slow small shuffling steps taken, pt unsteady, vc needed for walker safety and technique, after lengthy seated break pt completed additional mobility X 12 feet with CGA - min A, fatigued quickly, no LOB       Activity Tolerance:  Activity Tolerance: Patient limited by fatigue, Patient limited by pain  Activity Tolerance: limited by increased anxiety levels throughout session.      Treatment Initiated:  OT jamaal completed, see above for more details. Pt required extended time for all tasks this date d/t high anxiety levels. Pt required min A for transfers and mod vc throughout to maintain hip precautions. Extended time spent educating pt on discharge recommendations and importance of getting additional therapy prior to returning home with . Pt does not have strong support system at home, needs reinforced, additional education on LHAE and hip precautions and safety with ADL/IADl tasks at home, needs reinforced     Assessment:  Assessment: Pt s/p fall with new R hip replacement and hip precautions. Pt requires incresaed assitance with ADLs and vc to maintain precautions during tasks. PT also currently requires min A for functional transfers and mobility, faitgues quickly. Pt normally independent and is caregiver for . Pt will benefit from continued OT services to address defictis and increase safety and indep wtih ADL tasks and decrease fall risk for return home   Performance deficits / Impairments: Decreased functional mobility , Decreased ADL status, Decreased endurance, Decreased balance, Decreased strength, Decreased safe awareness, Decreased high-level IADLs  Prognosis: Good  Discharge Recommendations: Patient would benefit from continued therapy after discharge     Past Medical, Surgical, and Family History:    Medical:   Past Medical History:   Diagnosis Date    Fall due to slipping on ice or snow 07/07/2014    History of colon cancer 2004    well diff.  andeno    Hyperplastic colon polyp     Irritable bowel syndrome     Osteoarthritis     Seizures [address] [address] 1969     1988 last seizure    Sprain and strain right ankle     Surgical:   Past Surgical History:   Procedure Laterality Date    CATARACT REMOVAL  2000    right  pajka    CATARACT REMOVAL  2008    left and right    COLECTOMY  2004    low anterior resection    COLONOSCOPY  2011    colon ca 2004    neidic   [address]  [place] TOTAL HIP ARTHROPLASTY Right 2/14/2018    RIGHT HIP TOTAL ARTHROPLASTY performed by Nani Lindo MD at [place]:   Family History   Problem Relation Age of Onset    Heart Disease Mother        Social History:   reports that she has never smoked. She has never used smokeless tobacco. She reports that she does not drink alcohol or use drugs. Lives at [address] [facility]  [address] [address]. Functional History:  Prior Function  Lives With: Spouse  Receives Help From: Soni Todder ( is in poor health recently, states neighbor assists when needed)  ADL Assistance: Independent  Homemaking Assistance: Independent  Ambulation Assistance: Independent  Transfer Assistance: Independent  Additional Comments: Patient states she is I with all ADLs. States up until her 's recent hospitalization, he was doing all the grocery shopping and laundry as his \"exercise\". Patient states she is able to do these activities if necessary. Patient does not wear O2 at home. Patient's  currently has [facility] services. IADL History:  Lift/Transfer Equipment Utilized: None    Review of Systems     CONSTITUTIONAL:  positive for fatigue  EYES:  negative  HEENT:  negative  RESPIRATORY:  positive for NC  CARDIOVASCULAR:  negative  GASTROINTESTINAL:  positive for constipation  GENITOURINARY:  negative  SKIN:  negative  HEMATOLOGIC/LYMPHATIC:  negative  MUSCULOSKELETAL:  positive for  myalgias, arthralgias, pain, joint swelling, stiff joints, decreased range of motion, muscle weakness and bone pain  NEUROLOGICAL:  positive for weakness and pain  BEHAVIOR/PSYCH:  positive for anxiety  10 point system review otherwise negative    Medications     Reviewed in EMR   ketorolac  15 mg Intravenous Q6H    rivaroxaban  10 mg Oral Daily    sodium chloride flush  10 mL Intravenous 2 times per day     PRNs: sodium chloride flush, acetaminophen, ondansetron, traMADol **OR** traMADol, hydrOXYzine    Allergies:    Allergies Allergen Reactions    Cefuroxime Axetil      Diarrhea  Has had keflex and amoxil before    Sulfa Antibiotics        Physical Exam:     Physical Exam:  BP (!) 124/58   Pulse 106   Temp 99.4 °F (37.4 °C) (Oral)   Resp 18   Ht 5' 7\" (1.702 m)   Wt 147 lb 8 oz (66.9 kg)   SpO2 96%   BMI 23.10 kg/m²     awake  Orientation:   person, place, time, situation  Mood: anxious  Affect: anxious  General appearance: Patient is well nourished, well developed, well groomed and in no acute distress, appearing stated age, [email or web address]    Memory:   grossly normal  Attention/Concentration: normal  Language:  normal    Cranial Nerves:  cranial nerves II-XII are grossly intact  ROM:  abnormal - RIGHT JADA  Motor Exam:  Motor exam is 3+ to 4 out of 5 all extremities with the exception of RIGHT HF and KE    Tone:  Normal RIGHT leg not tested  Muscle bulk: within normal limits  Sensory:  Sensory intact  Coordination:   Normal, RIGHT leg not tested  Deep Tendon Reflexes:  Reflexes are intact and symmetrical bilaterally, RIGHT leg not tested    Skin: warm and dry, no rash or erythema  Peripheral vascular: Pulses: Normal upper and lower extremity pulses; Edema: 1+    Relevant Studies:     Diagnostics:  Recent Results (from the past 24 hour(s))   CBC Auto Differential    Collection Time: 02/15/18  5:49 AM   Result Value Ref Range    WBC 7.1 4.8 - 10.8 thou/mm3    RBC 2.51 (L) 4.20 - 5.40 mill/mm3    Hemoglobin 8.8 (L) 12.0 - 16.0 gm/dl    Hematocrit 25.9 (L) 37.0 - 47.0 %    .3 (H) 81.0 - 99.0 fL    MCH 35.3 (H) 27.0 - 31.0 pg    MCHC 34.1 33.0 - 37.0 gm/dl    RDW 14.1 11.5 - 14.5 %    Platelets 83 (L) 246 - 400 thou/mm3    MPV 8.1 7.4 - 10.4 fL    Seg Neutrophils 83.1 %    Lymphocytes 4.6 %    Monocytes 11.6 %    Eosinophils 0.3 %    Basophils 0.4 %    nRBC 0 /100 wbc    Macrocytes 1+ Absent    Segs Absolute 5.9 1.8 - 7.7 thou/mm3    Lymphocytes # 0.3 (L) 1.0 - 4.8 thou/mm3    Monocytes # 0.8 0.4 - 1.3 thou/mm3    Eosinophils # 0.0 normal.     1.  No fractures. 2.  No joint effusion. 3.  The joint space is difficult to evaluate. **This report has been created using voice recognition software. It may contain minor errors which are inherent in voice recognition technology. ** Final report electronically signed by Dr. Patricia Pierre on 2/13/2018 1:06 AM    Xr Hip 2-3 Vw W Pelvis Right    Result Date: 2/12/2018  PROCEDURE: XR HIP 2-3 VW W PELVIS RIGHT CLINICAL INFORMATION: Fall, pain,  . COMPARISON: AP pelvis January 17, 2012. TECHNIQUE: 2 AP views the pelvis. An AP and lateral view of the right hip joint. FINDINGS: The AP view of the pelvis shows the pelvic ring is intact. The proximal left femur looks normal. Postsurgical changes again noted in the pelvis. There is a moderate amount of stool throughout colon. There is further progression of the degenerative changes in the right hip joint with severe sclerosis and hip joint space loss. The osteophytes have increased in size in the subcapital portion of the right femoral head extending out over the right femoral neck. There is deformity of the right femoral head with slight flattening of the superior margin. There is an intertrochanteric fracture with impaction at the fracture site involving the proximal right femur. There is no dislocation of the right femoral head. 1.  Acute appearing impacted intertrochanteric fracture of the proximal right femur without dislocation of the right femoral head. 2.  Severe degenerative changes which have progressed significantly since the older study of January 17, 2012. **This report has been created using voice recognition software. It may contain minor errors which are inherent in voice recognition technology. ** Final report electronically signed by Dr. Patricia Pierre on 2/12/2018 11:59 PM

## 2018-02-16 NOTE — PROGRESS NOTES
Subjective:  Chart Reviewed: Yes  Family / Caregiver Present: No  Subjective: RN approved session. Pt resting in bedside chair upon arrival on phone with her . With encouragement agreeable to therapy. Pain:  Denies. Social/Functional:  Lives With: Spouse  Type of Home:  (Condo)  Home Layout: One level  Home Access: Level entry  Home Equipment: Rolling walker, Cane     Objective:  Supine to Sit: Minimal assistance (Of R LE)  Sit to Supine: Unable to assess (Pt left sitting up in bedside chair. )    Transfers  Sit to Stand: Minimal Assistance (From bedside chair. )  Stand to sit: Contact guard assistance (Cuing to reach back with UEs and to control decent of transfer. )       Ambulation 1  Surface: level tile  Device: Rolling Walker  Assistance: Contact guard assistance  Quality of Gait: Decreased B step length, decreased B foot clearance. Min forward flexed posture. Min instability but no LOB  Distance:  8 feet x1   Comments: Pt anxious when up. States \"hold onto me tight so I dont fall\"    Exercises:  Exercises  Comments: Pt completed BLE ankle pumps, LAQ, mini marches x10 reps to increase strength and mobility. Activity Tolerance:  Activity Tolerance: Patient limited by cognitive status; Patient Tolerated treatment well    Assessment: Body structures, Functions, Activity limitations: Decreased functional mobility , Decreased ROM, Decreased strength, Decreased safe awareness, Decreased balance, Decreased high-level IADLs, Decreased endurance  Assessment: Pt tolerated session fairly well. Pt anxious throughout session and fearfull of falling. Decreased strength, decreased mobility. Prognosis: Good  REQUIRES PT FOLLOW UP: Yes  Discharge Recommendations: Continue to assess pending progress    Patient Education:  Patient Education: Transfer training. Gait technique. Reviewed hip precautions.      Equipment Recommendations:  Equipment Needed: No (continue to monitor patient

## 2018-02-16 NOTE — PLAN OF CARE
Problem: Pain:  Goal: Control of acute pain  Control of acute pain   Outcome: Met This Shift  Pt denies pain this shift. Problem: Risk for Impaired Skin Integrity  Goal: Tissue integrity - skin and mucous membranes  Structural intactness and normal physiological function of skin and  mucous membranes. Pt noted to have scattered bruises on left forearm and lower leg. No other skin issues noted at this time. Problem: Cardiovascular  Goal: No DVT, peripheral vascular complications  Outcome: Ongoing  Pt without s/s of DVT. Pt able to take prescribed anticoagulants and SCD'S in place to help prevent development of DVT. Problem: GI  Goal: No bowel complications  Outcome: Ongoing  Pt with hypoactive bowel sounds, passing flatus, and without nausea. Taking prescribed medications to assist with BM    Problem:   Goal: Adequate urinary output  Outcome: Ongoing  Pt voiding adequate amounts with/without difficulty. Problem: Musculor/Skeletal Functional Status  Goal: Highest potential functional level  Outcome: Ongoing  Pt requires extensive assistance with mobility. Tolerates fairly  with therapy. Problem: DAILY CARE  Goal: Daily care needs are met  Outcome: Ongoing  Pt provided assistance with ADL's. Problem: DISCHARGE BARRIERS  Goal: Patient's continuum of care needs are met  Outcome: Ongoing  Pt planing rehab at discharge. Pt will be transported to rehab to meet with therapeutic needs. Comments: Care plan reviewed with patient. Patient  verbalizes understanding of the plan of care and contribute to goal setting.

## 2018-02-16 NOTE — PROGRESS NOTES
Physical Therapy   [address] 7K - [id number]    Time In: 0740  Time Out: 0805  Timed Code Treatment Minutes: 25 Minutes  Minutes: 25          Date: 2018  Patient Name: Nydia Bills,  Gender:  female        MRN: [id number]  : 1934  (80 y.o.)     Referring Practitioner: Hill Mcgill CNP  Diagnosis: intertrochanteric fracture of right femur, closed, initial encounter  Additional Pertinent Hx: Patient is a 80year old female being seen s/p R JADA on 18. Patient has a history of previous falls and on 18 patient fell in bathroom and developed a R intertrochanteric fracture. Patient opted to have a JADA due to severe OA in the hip. Patient is FWB on the RLE. Patient has a history of peripheral neuropathy and anxiety     Past Medical History:   Diagnosis Date    Fall due to slipping on ice or snow 2014    History of colon cancer 2004    well diff.  andeno    Hyperplastic colon polyp     Irritable bowel syndrome     Osteoarthritis     Seizures ([facility] [address] 1969      last seizure    Sprain and strain right ankle     Past Surgical History:   Procedure Laterality Date    CATARACT REMOVAL  2000    right  pajka    CATARACT REMOVAL      left and right    COLECTOMY  2004    low anterior resection    COLONOSCOPY  2011    colon ca 2004    neidich    HYSTERECTOMY  1988    tahbso    [place] TOTAL HIP ARTHROPLASTY Right 2018    RIGHT HIP TOTAL ARTHROPLASTY performed by Holly Green MD at [facility]       Restrictions/Precautions:  Restrictions/Precautions: Weight Bearing, General Precautions, Fall Risk    Right Lower Extremity Weight Bearing: Weight Bearing As Tolerated       Position Activity Restriction  Hip Precautions: No hip flexion > 90 degrees, No ADduction, No hip internal rotation  Other position/activity restrictions: Dr. Sarai Coelho THR: no bending >90, no crossing midline, no twisting/pivoring, no IR; O2

## 2018-02-16 NOTE — PROGRESS NOTES
[facility]  Individualized Disclosure Statement      Patient: Gely Dan      [facility] [address] provides 24 hour individualized service to patients with functional limitations due to, but not limited to: stroke, brain injury, spinal cord injury, major multiple trauma, fractures, amputation, and neurological disorders. The [address] [place] [address] provides rehabilitative nursing and medical services as well as physical, occupational, speech, and recreation therapies. [address] [facility] is fully accredited by the Commission on Accreditation of Rehabilitation Facilities (CARF) as a comprehensive provider of rehabilitation services. Patients admitted to the [address] [place] [address] receive a minimum of three hours of therapy per day, at least six days per week, with a revised therapy schedule on weekends and holidays. Physical therapy, occupational therapy, and speech therapy are provided seven days per week including holidays. Other therapeutic services are available on weekends and evenings as needed or scheduled. Intensity of Treatment  Your treatment program will consist of Nursing Care and:  1.5 hours of Physical Therapy, per day  1.5 hours of Occupational Therapy, per day  0    hours of Speech Therapy, per day  1 hours of Recreational Therapy, per week    [facility] maintains contracts with most insurance plans. Depending on the type of coverage, the insurance may impose limits on the coverage for rehabilitation care. Coverage is based on the premise that you are able to fully participate in the rehabilitation program and show continued progress. Please verify your own insurance information A copy of this was given to the patient/ family on this date.   Insurance Coverage  Your insurance company has made the following determination relative to the length of your stay:   Your estimated length of stay is 14 days   Your insurance Coverage has been verified as follows:    Primary Insurance: Medicare   Deductible: $1340.00  Coverage: Active  Secondary Insurance: [employer]; MICHAEL Lai often cover co-pay amounts, but to ensure payment please contact your insurance company.     Alternative Resources: Please ask the  for more information [phone]

## 2018-02-17 LAB
ALBUMIN SERPL-MCNC: 2.9 G/DL (ref 3.5–5.1)
ALP BLD-CCNC: 99 U/L (ref 38–126)
ALT SERPL-CCNC: 12 U/L (ref 11–66)
ANION GAP SERPL CALCULATED.3IONS-SCNC: 10 MEQ/L (ref 8–16)
AST SERPL-CCNC: 27 U/L (ref 5–40)
BACTERIA: ABNORMAL /HPF
BILIRUB SERPL-MCNC: 0.6 MG/DL (ref 0.3–1.2)
BILIRUBIN URINE: NEGATIVE
BLOOD, URINE: ABNORMAL
BUN BLDV-MCNC: 23 MG/DL (ref 7–22)
CALCIUM SERPL-MCNC: 8.7 MG/DL (ref 8.5–10.5)
CASTS 2: ABNORMAL /LPF
CASTS UA: ABNORMAL /LPF
CHARACTER, URINE: ABNORMAL
CHLORIDE BLD-SCNC: 110 MEQ/L (ref 98–111)
CO2: 22 MEQ/L (ref 23–33)
COLOR: YELLOW
CREAT SERPL-MCNC: 0.6 MG/DL (ref 0.4–1.2)
CRYSTALS, UA: ABNORMAL
EPITHELIAL CELLS, UA: ABNORMAL /HPF
GFR SERPL CREATININE-BSD FRML MDRD: > 90 ML/MIN/1.73M2
GLUCOSE BLD-MCNC: 112 MG/DL (ref 70–108)
GLUCOSE URINE: NEGATIVE MG/DL
HCT VFR BLD CALC: 24.5 % (ref 37–47)
HEMOGLOBIN: 8 GM/DL (ref 12–16)
KETONES, URINE: NEGATIVE
LEUKOCYTE ESTERASE, URINE: ABNORMAL
MISCELLANEOUS 2: ABNORMAL
NITRITE, URINE: POSITIVE
PH UA: 7.5
POTASSIUM SERPL-SCNC: 4 MEQ/L (ref 3.5–5.2)
PROTEIN UA: 100
RBC URINE: ABNORMAL /HPF
RENAL EPITHELIAL, UA: ABNORMAL
SODIUM BLD-SCNC: 142 MEQ/L (ref 135–145)
SPECIFIC GRAVITY, URINE: 1.02 (ref 1–1.03)
TOTAL PROTEIN: 5.9 G/DL (ref 6.1–8)
UROBILINOGEN, URINE: 1 EU/DL
VITAMIN D 25-HYDROXY: 26 NG/ML (ref 30–100)
WBC UA: > 100 /HPF
YEAST: ABNORMAL

## 2018-02-17 PROCEDURE — 97116 GAIT TRAINING THERAPY: CPT

## 2018-02-17 PROCEDURE — 97535 SELF CARE MNGMENT TRAINING: CPT

## 2018-02-17 PROCEDURE — 97166 OT EVAL MOD COMPLEX 45 MIN: CPT

## 2018-02-17 PROCEDURE — 97110 THERAPEUTIC EXERCISES: CPT

## 2018-02-17 PROCEDURE — 97530 THERAPEUTIC ACTIVITIES: CPT

## 2018-02-17 PROCEDURE — 6370000000 HC RX 637 (ALT 250 FOR IP): Performed by: ORTHOPAEDIC SURGERY

## 2018-02-17 PROCEDURE — 1180000000 HC REHAB R&B

## 2018-02-17 PROCEDURE — 85014 HEMATOCRIT: CPT

## 2018-02-17 PROCEDURE — 87184 SC STD DISK METHOD PER PLATE: CPT

## 2018-02-17 PROCEDURE — 85018 HEMOGLOBIN: CPT

## 2018-02-17 PROCEDURE — 80053 COMPREHEN METABOLIC PANEL: CPT

## 2018-02-17 PROCEDURE — 87186 SC STD MICRODIL/AGAR DIL: CPT

## 2018-02-17 PROCEDURE — 87077 CULTURE AEROBIC IDENTIFY: CPT

## 2018-02-17 PROCEDURE — 87086 URINE CULTURE/COLONY COUNT: CPT

## 2018-02-17 PROCEDURE — 6370000000 HC RX 637 (ALT 250 FOR IP): Performed by: PHYSICAL MEDICINE & REHABILITATION

## 2018-02-17 PROCEDURE — 36415 COLL VENOUS BLD VENIPUNCTURE: CPT

## 2018-02-17 PROCEDURE — 81001 URINALYSIS AUTO W/SCOPE: CPT

## 2018-02-17 PROCEDURE — 97162 PT EVAL MOD COMPLEX 30 MIN: CPT

## 2018-02-17 PROCEDURE — 82306 VITAMIN D 25 HYDROXY: CPT

## 2018-02-17 PROCEDURE — 6360000002 HC RX W HCPCS: Performed by: ORTHOPAEDIC SURGERY

## 2018-02-17 RX ORDER — SENNA PLUS 8.6 MG/1
4 TABLET ORAL DAILY
Status: DISCONTINUED | OUTPATIENT
Start: 2018-02-18 | End: 2018-02-20

## 2018-02-17 RX ORDER — POLYETHYLENE GLYCOL 3350 17 G/17G
17 POWDER, FOR SOLUTION ORAL DAILY
Status: DISCONTINUED | OUTPATIENT
Start: 2018-02-17 | End: 2018-02-20

## 2018-02-17 RX ORDER — SENNA PLUS 8.6 MG/1
4 TABLET ORAL NIGHTLY
Status: DISCONTINUED | OUTPATIENT
Start: 2018-02-17 | End: 2018-02-17

## 2018-02-17 RX ORDER — ASCORBIC ACID 500 MG
250 TABLET ORAL 2 TIMES DAILY WITH MEALS
Status: DISCONTINUED | OUTPATIENT
Start: 2018-02-17 | End: 2018-03-02 | Stop reason: HOSPADM

## 2018-02-17 RX ORDER — FERROUS SULFATE 325(65) MG
325 TABLET ORAL 2 TIMES DAILY WITH MEALS
Status: DISCONTINUED | OUTPATIENT
Start: 2018-02-17 | End: 2018-03-02 | Stop reason: HOSPADM

## 2018-02-17 RX ORDER — DOCUSATE SODIUM 100 MG/1
100 CAPSULE, LIQUID FILLED ORAL 3 TIMES DAILY
Status: DISCONTINUED | OUTPATIENT
Start: 2018-02-17 | End: 2018-02-20

## 2018-02-17 RX ADMIN — TRAMADOL HYDROCHLORIDE 50 MG: 50 TABLET, FILM COATED ORAL at 06:29

## 2018-02-17 RX ADMIN — DOCUSATE SODIUM 100 MG: 100 CAPSULE ORAL at 21:16

## 2018-02-17 RX ADMIN — POLYETHYLENE GLYCOL (3350) 17 G: 17 POWDER, FOR SOLUTION ORAL at 18:01

## 2018-02-17 RX ADMIN — ENOXAPARIN SODIUM 30 MG: 30 INJECTION SUBCUTANEOUS at 11:21

## 2018-02-17 RX ADMIN — OXYCODONE HYDROCHLORIDE AND ACETAMINOPHEN 250 MG: 500 TABLET ORAL at 18:01

## 2018-02-17 RX ADMIN — PHENYTOIN SODIUM 100 MG: 100 CAPSULE ORAL at 21:16

## 2018-02-17 RX ADMIN — DOCUSATE SODIUM 100 MG: 100 CAPSULE ORAL at 11:20

## 2018-02-17 RX ADMIN — PHENYTOIN SODIUM 100 MG: 100 CAPSULE ORAL at 11:20

## 2018-02-17 RX ADMIN — PHENYTOIN SODIUM 100 MG: 100 CAPSULE ORAL at 11:18

## 2018-02-17 RX ADMIN — FERROUS SULFATE TAB 325 MG (65 MG ELEMENTAL FE) 325 MG: 325 (65 FE) TAB at 18:01

## 2018-02-17 RX ADMIN — ENOXAPARIN SODIUM 30 MG: 30 INJECTION SUBCUTANEOUS at 21:17

## 2018-02-17 ASSESSMENT — PAIN SCALES - GENERAL
PAINLEVEL_OUTOF10: 4
PAINLEVEL_OUTOF10: 0
PAINLEVEL_OUTOF10: 0
PAINLEVEL_OUTOF10: 4

## 2018-02-17 NOTE — PROGRESS NOTES
Urine specimen obtained using mini cath. Urine hazy yellow and foul smelling. Specimen to lab for urine reflex culture.

## 2018-02-17 NOTE — PROGRESS NOTES
initiate mobility of LEs )  Sit to Supine: Maximum assistance (to get B LE into bed )    Transfers  Sit to Stand: Minimal Assistance (from EOB. Pt requires max cues to activate LEs to assist with t/fs )  Stand to sit: Contact guard assistance (cues to control decent and to kick R LE out prior to sitting )       Ambulation 1  Surface: level tile  Device: Rolling Walker  Assistance: Contact guard assistance  Quality of Gait: Decreased B step length, decreased B foot clearance. Min forward flexed posture. Pt needs cues to increase step length on L LE. Pt fatigues quickly. Needs encouragement to increase distance amb   Distance: 10' x1   Comments: Pt anxious when up           Exercises:  Comments: Pt completed BLE ankle pumps, glut sets, quad sets, short arc quads, heel slides with mod A on R LE. Hip ABD with mod A on R LE . Ther ex performed to increase strength for functional mobility. Activity Tolerance:  Activity Tolerance: Patient limited by cognitive status; Patient Tolerated treatment well  Activity Tolerance: Pt emotional during session today. Pt frustrated with husbands temper in trying to assist in bringing pt clothes from home     Treatment Initiated: See there ex above. Pt required increased time for mobility throughout. Pt refused to amb across uneven surface and refused doing the step today. Pt performed car t/f with min A to get legs in and out of the car and required mod A x 1 to stand from the car. Pt anxious and frustrated with this task. Pt required increased time to perform. Pt amb 3' x1 with RW from w/c to bed. Pt progressed to mod A x 1 with t/fs from w/c as treatment progressed. Pt propeled w/c 50'x 1 with SBA x 1. Pt c/o R shoulder hurting pt during w/c propulsion. Assessment:   Body structures, Functions, Activity limitations: Decreased functional mobility , Decreased ROM, Decreased strength, Decreased safe awareness, Decreased balance,

## 2018-02-17 NOTE — H&P
Physical Medicine & Rehabilitation  History and Physical      Chief Complaint and Reason for Rehabilitation Admission: Fall/RIGHT Hip Fx/RIGHT JADA    History of Present Illness:  Nydia Bills is a 80 y.o. female who  has a past medical history of Drug-induced polyneuropathy ([facility] [address] Fall due to slipping on ice or snow; History of colon cancer; Hyperplastic colon polyp; Irritable bowel syndrome; Osteoarthritis; Seizures ([address] and Sprain and strain. She was admitted 2/16/2018 to the inpatient rehabilitation unit. Original admission 2/12/2018 after a ground level where she lost her balance while walking into her bathroom. She fell on to her RIGHT side and was unable to stand afterwards, and crawled to the hallway to get help from her . She had no LOC with this fall. Imaging showed an acute appearing impacted intertrochanteric fracture of the proximal RIGHT femur without dislocation of the RIGHT femoral head. She underwent a RIGHT total hip arthroplasty by Dr. Sarai Coelho on 02/14/2018.     She did have a fall two months ago which resulted in RIGHT shoulder pain, which has been persistent since her fall. She was initially referred to [facility] following her fall in December, but did not follow up.     On initial consultation exam she states her pain is 0/10 at rest and no number given for movement with therapy. Joby Toro is currently only tramadol for pain control and is controlled with this medication.  No BM since admission; but is passing gas. She has spouse that cannot help after discharge and she would like a stay on the inpatient rehabilitation unit to improve ability for self care and mobility  She does live with her spouse in a condo.     Previously was independent of ADLs and used no AD for ambulation prior to recent admission. Initially ambulated 8' with RW at [facility] with PT. With therapy is maxA for bed mobility, Abad/CGA for transfers, and SBA/Abad with balance. In the interim she has not had a bowel movement. strain right ankle     Primary care provider: Brigette Damico MD     Past Surgical History:      Procedure Laterality Date    CATARACT REMOVAL  2000    right  pajka    CATARACT REMOVAL  2008    left and right    COLECTOMY  2004    low anterior resection    COLONOSCOPY  2011    colon ca 2004    neidich    HYSTERECTOMY  1988    tahbso    MI TOTAL HIP ARTHROPLASTY Right 2/14/2018    RIGHT HIP TOTAL ARTHROPLASTY performed by Arnaldo Wick MD [address]:    Cefuroxime axetil and Sulfa antibiotics    Current Medications:    Current Facility-Administered Medications: acetaminophen (TYLENOL) tablet 650 mg, 650 mg, Oral, Q4H PRN  traMADol (ULTRAM) tablet 50 mg, 50 mg, Oral, Q6H PRN **OR** traMADol (ULTRAM) tablet 100 mg, 100 mg, Oral, Q6H PRN  enoxaparin (LOVENOX) injection 30 mg, 30 mg, Subcutaneous, BID  hydrOXYzine (VISTARIL) capsule 25 mg, 25 mg, Oral, 4x Daily PRN  phenytoin (DILANTIN) ER capsule 100 mg, 100 mg, Oral, Every Other Day  phenytoin (DILANTIN) ER capsule 100 mg, 100 mg, Oral, BID  senna (SENOKOT) tablet 17.2 mg, 2 tablet, Oral, Nightly  docusate sodium (COLACE) capsule 100 mg, 100 mg, Oral, BID  magnesium hydroxide (MILK OF MAGNESIA) 400 MG/5ML suspension 30 mL, 30 mL, Oral, Daily PRN  polyethylene glycol (GLYCOLAX) packet 17 g, 17 g, Oral, Daily PRN     Social History:   reports that she has never smoked. She has never used smokeless tobacco. She reports that she does not drink alcohol or use drugs. Lives [address]  [address] [place] [address] [address] [id number].      Social/Functional History  Lives With: Spouse  Type of Home:  (Condo)  Home Layout: One level  Home Access: Level entry  Bathroom Shower/Tub: Walk-in shower  Bathroom Toilet: Handicap height  Bathroom Equipment: Grab bars in shower, Shower chair  Home Equipment: Rolling walker, Cane  Receives Help From: [address]: Independent  Homemaking Responsibilities: Yes  Ambulation Assistance: Independent  Transfer Assistance: Independent  Active : No  Occupation: Retired  Additional Comments: Patient states she is I with all ADLs. States up until her 's recent hospitalization, he was doing all the grocery shopping and laundry as his \"exercise\". Patient states she is able to do these activities if necessary. patient's  currently has [employer] services. Functional History:  Prior Function  Lives With: Spouse  Receives Help From: Neighbor  ADL Assistance: Independent  Homemaking Assistance: Independent  Ambulation Assistance: Independent  Transfer Assistance: Independent  Additional Comments: Patient states she is I with all ADLs. States up until her 's recent hospitalization, he was doing all the grocery shopping and laundry as his \"exercise\". Patient states she is able to do these activities if necessary. patient's  currently has [employer] services.    IADL History:  Lift/Transfer Equipment Utilized: None    Family History:       Problem Relation Age of Onset    Heart Disease Mother      Review of Systems:  CONSTITUTIONAL:  positive for fatigue  EYES:  negative  HEENT:  negative  RESPIRATORY:  negative  CARDIOVASCULAR:  negative  GASTROINTESTINAL:  positive for constipation  GENITOURINARY:  negative  SKIN:  negative  HEMATOLOGIC/LYMPHATIC:  negative  MUSCULOSKELETAL:  positive for  myalgias, arthralgias, pain, joint swelling, stiff joints, decreased range of motion, muscle weakness and bone pain  NEUROLOGICAL:  positive for weakness and pain  BEHAVIOR/PSYCH:  positive for anxiety  10 point system review otherwise negativenegative    Physical Exam:  BP (!) 137/54   Pulse 97   Temp 98.5 °F (36.9 °C) (Oral)   Resp 16   Ht 5' 7\" (1.702 m)   Wt 156 lb 12.8 oz (71.1 kg)   SpO2 98%   BMI 24.56 kg/m²     awake  Orientation:   person, place, time, situation  Mood: anxious  Affect: anxious  General appearance: Patient is well nourished, well developed, well groomed and in no acute distress, appearing stated age     Memory:   grossly normal  Attention/Concentration: normal  Language:  normal     Cranial Nerves:  cranial nerves II-XII are grossly intact  ROM:  abnormal - RIGHT JADA  Motor Exam:  Motor exam is 3+ to 4 out of 5 all extremities with the exception of RIGHT HF and KE     Tone:  Normal RIGHT leg not tested  Muscle bulk: within normal limits  Sensory:  Sensory intact  Coordination:   Normal, RIGHT leg not tested  Deep Tendon Reflexes:  Reflexes are intact and symmetrical bilaterally, RIGHT leg not tested     Skin: warm and dry, no rash or erythema  Peripheral vascular: Pulses: Normal upper and lower extremity pulses; Edema: 1+    Diagnostics:  Recent Results (from the past 24 hour(s))   EKG 12 Lead    Collection Time: 02/16/18  3:14 PM   Result Value Ref Range    Ventricular Rate 101 BPM    Atrial Rate 101 BPM    P-R Interval 154 ms    QRS Duration 132 ms    Q-T Interval 352 ms    QTc Calculation (Bazett) 456 ms    P Axis 72 degrees    R Axis -13 degrees    T Axis 108 degrees   Comprehensive Metabolic Panel    Collection Time: 02/17/18  6:39 AM   Result Value Ref Range    Glucose 112 (H) 70 - 108 mg/dL    CREATININE 0.6 0.4 - 1.2 mg/dL    BUN 23 (H) 7 - 22 mg/dL    Sodium 142 135 - 145 meq/L    Potassium 4.0 3.5 - 5.2 meq/L    Chloride 110 98 - 111 meq/L    CO2 22 (L) 23 - 33 meq/L    Calcium 8.7 8.5 - 10.5 mg/dL    AST 27 5 - 40 U/L    Alkaline Phosphatase 99 38 - 126 U/L    Total Protein 5.9 (L) 6.1 - 8.0 g/dL    Alb 2.9 (L) 3.5 - 5.1 g/dL    Total Bilirubin 0.6 0.3 - 1.2 mg/dL    ALT 12 11 - 66 U/L   Vitamin D 25 Hydroxy    Collection Time: 02/17/18  6:39 AM   Result Value Ref Range    Vit D, 25-Hydroxy 26 (L) 30 - 100 ng/ml   Hemoglobin and Hematocrit, Blood    Collection Time: 02/17/18  6:39 AM   Result Value Ref Range    Hemoglobin 8.0 (L) 12.0 - 16.0 gm/dl    Hematocrit 24.5 (L) 37.0 - 47.0 %   Anion Gap    Collection Time: 02/17/18  6:39 AM   Result Value Ref Range    Anion Gap

## 2018-02-17 NOTE — PLAN OF CARE
protocol, seating and positioning, skin/wound care, pressure relief instruction, dressing changes, infection protection, DVT prophylaxis, assistance with safe transfers , and/or assistance with bathroom activities and hygiene. PHYSICAL THERAPY:  Goals:        Short term goals  Time Frame for Short term goals: 1 wk  Short term goal 1: Patient will perform bed mobility Min +1 to decrease risk of pressure ulcers    Short term goal 2: Patient will perform functional transfers Min +1, consistently, to sit in bedside chair   Short term goal 3: Patient will ambulate >= 25 ft x1 with  RW and CGA to progress to home mobility. Short term goal 4: Patient will perform car transfer min +1 for transportation needs. Short term goal 5: Pt to walk with RW on/off elevator, min +1 , for access to condo. Long term goals  Time Frame for Long term goals : 3 wks  Long term goal 1: Pt will be Mod I with bed mobility so pt can get in and out of bed to go to the bathroom    Long term goal 2: Pt will perform t/f with Mod I from various surfaces to get up to go to the bathroom  Long term goal 3: Pt will amb with RW >= 50 ft, with RW, Mod I for home mobility. Long term goal 4: Pt will perform car t/f with CGA x 1 for discharge home   Long term goal 5: Pt will ambulate on/off elevator with RW, SBA, for access to condo. Plan of Care: Pt to be seen by physical therapy services 1 Hour 30 Minutes per day at least 5 out of 7 days per week for a total of 10 days during estimated length of stay    Anticipated interventions may include therapeutic exercises, gait training, neuromuscular re-ed, transfer training, community reintegration, bed mobility, w/c mobility and training.     OCCUPATIONAL THERAPY:  Goals:             Short term goals  Time Frame for Short term goals: 1 week  Short term goal 1: Pt will complete functional mobility to/from bathroom with CGA and RW with no vc for safety to increase indep with HH ambulation  Short term goal 2: Pt will complete BADL routine with mod A and LHAE with no vc for hip precautions to increase indep with ADL routine  Short term goal 3: Pt will complete BUE strengthening exercises to increase strength and endurance needed for ease with ADLs  Short term goal 4: Pt will complete standing ADL wtih CGA and 1-2 hand release for > 4 minutes to increase indep with cooking tasks. Short term goal 5: Pt will complete functional transfers with CGA and no vc for safety including to/from toilet. Long term goals  Time Frame for Long term goals : 2 weeks  Long term goal 1: Pt will complete simple meal prep while standing with SBA and min vc for safety to increase indep within home environment. Long term goal 2: Pt will complete functional mobility household distances with SBA and min vc for safety to increase indep with ADLs/IADLs in home environment. Plan of Care: Patient to be seen by occupational therapy services 1 Hour 30 Minutes per day at least 5 out of 7 days per week for a total of 10 days during estimated length of stay    Anticipated interventions may include ADL and IADL retraining, strengthening, safety education and training, patient/caregiver education and training, equipment evaluation/ training/procurement, neuromuscular reeducation, wheelchair mobility training. SPEECH THERAPY:   Goals:  N/A    CASE MANAGEMENT:  Goals:   Assist patient/family with discharge planning, patient/family counseling,  and coordination with insurance during the inpatient rehabilitation stay. Other members of the multidisciplinary rehabilitation team that will be involved in the patient's plan of care include recreational therapy, dietary, respiratory therapy, and neuropsychology.     Medical issues being managed closely and that require 24 hour availability of a physician:  Bowel/Bladder function, Weight bearing precautions, Wound care, Pain management, Infection protection, DVT prophylaxis, Fall precautions and

## 2018-02-17 NOTE — PROGRESS NOTES
[facility]  INPATIENT OCCUPATIONAL THERAPY  [facility]  EVALUATION    Time:  Time In: 0700  Time Out: 0830  Timed Code Treatment Minutes: 75 Minutes  Minutes: 90    Date: 2018  Patient Name: Gabriel Rudd,   Gender: female      MRN: [id number]  : 1934  (80 y.o.)  Referring Practitioner: Ordering and attending prov: Den Salcedo MD  Diagnosis: Status Post Right Hip Replacement   Additional Pertinent Hx: R JADA on  by Dr Gordy Harrison    Restrictions/Precautions:  Restrictions/Precautions: Weight Bearing, General Precautions, Fall Risk    Right Lower Extremity Weight Bearing: Weight Bearing As Tolerated     Position Activity Restriction  Hip Precautions: No hip flexion > 90 degrees, No ADduction, No hip internal rotation  Other position/activity restrictions: Dr. Emily Harrison THR: no bending >90, no crossing midline, no twisting/pivoring, no IR      Past Medical History:   Diagnosis Date    Drug-induced polyneuropathy ([facility] [address] 2016    Fall due to slipping on ice or snow 2014    History of colon cancer 2004    well diff. andeno    Hyperplastic colon polyp     Irritable bowel syndrome     Osteoarthritis     Seizures ([address] 1969      last seizure    Sprain and strain right ankle     Past Surgical History:   Procedure Laterality Date    CATARACT REMOVAL  2000    right  pajka    CATARACT REMOVAL      left and right    COLECTOMY  2004    low anterior resection    COLONOSCOPY  2011    colon ca 2004    neidich    HYSTERECTOMY  1988    tahbso    IN TOTAL HIP ARTHROPLASTY Right 2018    RIGHT HIP TOTAL ARTHROPLASTY performed by Shelley Mota MD at [facility]  Chart Reviewed: Yes  Patient assessed for rehabilitation services?: Yes  Family / Caregiver Present: No    Subjective: Upon arrival patient was sleeping in bed. Pt was agreeable to OT session with encouragement.      General:  Overall Orientation Status: Within Functional Limits    Vision: Impaired    Hearing: Within functional limits    Pain:  Pain Assessment  Patient Currently in Pain: Denies (while lying in bed. )       Social/Functional:  Lives With: Spouse  Type of Home:  (Condo)  Home Layout: One level  Home Access: Level entry  Home Equipment: Rolling walker, Cane     Bathroom Shower/Tub: Walk-in shower  Bathroom Toilet: Handicap height  Bathroom Equipment: Grab bars in shower, Shower chair    Receives Help From: Neighbor (Neighbor assists as needed d/t  being in poor health )  ADL Assistance: Independent  Homemaking Assistance: Independent  Homemaking Responsibilities: Yes    Ambulation Assistance: Independent  Transfer Assistance: Independent    Active : No  Occupation: Retired  Additional Comments: Patient states she is I with all ADLs. States up until her 's recent hospitalization, he was doing all the grocery shopping and laundry as his \"exercise\". Patient states she is able to do these activities if necessary. patient's  currently has [facility] services. Objective  Overall Cognitive Status: Exceptions  Arousal/Alertness: Delayed responses to stimuli  Following Commands: Follows one step commands consistently  Attention Span: Attends with cues to redirect  Safety Judgement: Decreased awareness of need for safety  Problem Solving: Assistance required to identify errors made, Assistance required to generate solutions, Assistance required to implement solutions, Assistance required to correct errors made, Decreased awareness of errors  Insights: Fully aware of deficits  Initiation: Requires cues for some  Sequencing: Requires cues for some  Cognition Comment: Pt demonstrates decreased safety awarness and anxiety level with actvity. Sensation  Overall Sensation Status: WFL (Pt reports Chronic neuropathy in B LEs. )         LUE AROM (degrees)  LUE AROM : WFL     RUE AROM (degrees)  RUE General AROM: 90 degrees shoulder flexion, WFL distally.  pt Activity Tolerance:  Activity Tolerance: Patient limited by fatigue, Patient limited by pain  Activity Tolerance: limited by increased anxiety levels throughout session. Treatment Initiated:  OT Evaluation completed. See above for details. Assessment:  Assessment: Pt s/p fall with new R hip replacement and hip precautions. Pt requires increased assitance with ADLs and vc to maintain precautions during tasks. Pt demonstrates high anxiety d/t fear of falling. Pt requires encouragement throughout session. Pt requires skilled OT intervention to increase indep and safety with all self cares, IADLs, transfers, and functional mobility while maintaining hip precautions to decrease fall risk and return to PLOF. Performance deficits / Impairments: Decreased functional mobility , Decreased ADL status, Decreased endurance, Decreased balance, Decreased strength, Decreased safe awareness, Decreased high-level IADLs  Prognosis: Good  Discharge Recommendations: Continue to assess pending progress    Clinical Decision Making: Clinical Decision making was of Moderate Complexity as the result of analysis of data from a detailed assessment, a consideration of several treatment options, the presence of comorbidities affecting the plan of care and the need for minimal to moderate modifications or assistance required to complete the evaluation. Patient Education:  Patient Education: OT POC, OT Role, Transfer safety, Walker safety, Hip precautions, self care safety, OT expectations on rehab, HEP. Barriers to Learning: Anxiety     Equipment Recommendations:  Equipment Needed: Yes  Mobility Devices: ADL Assistive Devices  ADL Assistive Devices: Sock-Aid Hard, Long-handled Sponge, Long-handled Shoe Horn, Reacher    Safety:  Safety Devices in place: Yes  Type of devices:  All fall risk precautions in place, Gait belt, Left in bed, Call light within reach, Bed alarm in place, Nurse notified    Plan:  Times per week: 5x/week x

## 2018-02-18 LAB
BASOPHILS # BLD: 0.5 %
BASOPHILS ABSOLUTE: 0 THOU/MM3 (ref 0–0.1)
EOSINOPHIL # BLD: 1.9 %
EOSINOPHILS ABSOLUTE: 0.1 THOU/MM3 (ref 0–0.4)
HCT VFR BLD CALC: 24.8 % (ref 37–47)
HEMOGLOBIN: 8.5 GM/DL (ref 12–16)
LYMPHOCYTES # BLD: 11 %
LYMPHOCYTES ABSOLUTE: 0.6 THOU/MM3 (ref 1–4.8)
MACROCYTES: ABNORMAL
MCH RBC QN AUTO: 35.2 PG (ref 27–31)
MCHC RBC AUTO-ENTMCNC: 34.3 GM/DL (ref 33–37)
MCV RBC AUTO: 102.7 FL (ref 81–99)
MONOCYTES # BLD: 9.5 %
MONOCYTES ABSOLUTE: 0.5 THOU/MM3 (ref 0.4–1.3)
NUCLEATED RED BLOOD CELLS: 0 /100 WBC
PDW BLD-RTO: 13.9 % (ref 11.5–14.5)
PLATELET # BLD: 179 THOU/MM3 (ref 130–400)
PMV BLD AUTO: 7.8 FL (ref 7.4–10.4)
RBC # BLD: 2.42 MILL/MM3 (ref 4.2–5.4)
SEG NEUTROPHILS: 77.1 %
SEGMENTED NEUTROPHILS ABSOLUTE COUNT: 4 THOU/MM3 (ref 1.8–7.7)
WBC # BLD: 5.2 THOU/MM3 (ref 4.8–10.8)

## 2018-02-18 PROCEDURE — 6370000000 HC RX 637 (ALT 250 FOR IP): Performed by: ORTHOPAEDIC SURGERY

## 2018-02-18 PROCEDURE — 85025 COMPLETE CBC W/AUTO DIFF WBC: CPT

## 2018-02-18 PROCEDURE — 6370000000 HC RX 637 (ALT 250 FOR IP): Performed by: PHYSICAL MEDICINE & REHABILITATION

## 2018-02-18 PROCEDURE — 36415 COLL VENOUS BLD VENIPUNCTURE: CPT

## 2018-02-18 PROCEDURE — 6360000002 HC RX W HCPCS: Performed by: ORTHOPAEDIC SURGERY

## 2018-02-18 PROCEDURE — 1180000000 HC REHAB R&B

## 2018-02-18 RX ORDER — GRANULES FOR ORAL 3 G/1
3 POWDER ORAL
Status: DISCONTINUED | OUTPATIENT
Start: 2018-02-18 | End: 2018-02-27

## 2018-02-18 RX ADMIN — OXYCODONE HYDROCHLORIDE AND ACETAMINOPHEN 250 MG: 500 TABLET ORAL at 18:11

## 2018-02-18 RX ADMIN — POLYETHYLENE GLYCOL (3350) 17 G: 17 POWDER, FOR SOLUTION ORAL at 09:08

## 2018-02-18 RX ADMIN — OXYCODONE HYDROCHLORIDE AND ACETAMINOPHEN 250 MG: 500 TABLET ORAL at 09:09

## 2018-02-18 RX ADMIN — PHENYTOIN SODIUM 100 MG: 100 CAPSULE ORAL at 09:09

## 2018-02-18 RX ADMIN — PHENYTOIN SODIUM 100 MG: 100 CAPSULE ORAL at 21:57

## 2018-02-18 RX ADMIN — FOSFOMYCIN TROMETHAMINE 1 PACKET: 3 POWDER ORAL at 14:09

## 2018-02-18 RX ADMIN — ENOXAPARIN SODIUM 30 MG: 30 INJECTION SUBCUTANEOUS at 21:56

## 2018-02-18 RX ADMIN — DOCUSATE SODIUM 100 MG: 100 CAPSULE ORAL at 14:08

## 2018-02-18 RX ADMIN — FERROUS SULFATE TAB 325 MG (65 MG ELEMENTAL FE) 325 MG: 325 (65 FE) TAB at 18:11

## 2018-02-18 RX ADMIN — DOCUSATE SODIUM 100 MG: 100 CAPSULE ORAL at 09:09

## 2018-02-18 RX ADMIN — ENOXAPARIN SODIUM 30 MG: 30 INJECTION SUBCUTANEOUS at 09:09

## 2018-02-18 RX ADMIN — FERROUS SULFATE TAB 325 MG (65 MG ELEMENTAL FE) 325 MG: 325 (65 FE) TAB at 09:09

## 2018-02-18 RX ADMIN — SENNOSIDES 34.4 MG: 8.6 TABLET, FILM COATED ORAL at 09:08

## 2018-02-18 ASSESSMENT — PAIN SCALES - GENERAL: PAINLEVEL_OUTOF10: 0

## 2018-02-18 NOTE — PROGRESS NOTES
250 mg Oral BID WC    senna  4 tablet Oral Daily    enoxaparin  30 mg Subcutaneous BID    phenytoin  100 mg Oral Every Other Day    phenytoin  100 mg Oral BID     Continuous Infusions:   PRN Meds:acetaminophen, traMADol **OR** traMADol, hydrOXYzine, magnesium hydroxide    Imaging:    Lab Review :    Lab Results   Component Value Date    WBC 7.1 02/15/2018    HGB 8.0 (L) 02/17/2018    HCT 24.5 (L) 02/17/2018    .3 (H) 02/15/2018    PLT 83 (L) 02/15/2018     Lab Results   Component Value Date    CREATININE 0.6 02/17/2018    BUN 23 (H) 02/17/2018     02/17/2018    K 4.0 02/17/2018     02/17/2018    CO2 22 (L) 02/17/2018     No results found for: CKTOTAL, CKMB, CKMBINDEX, TROPONINI  Lab Results   Component Value Date    ALT 12 02/17/2018    AST 27 02/17/2018    ALKPHOS 99 02/17/2018    BILITOT 0.6 02/17/2018     Lab Results   Component Value Date    LIPASE 74.9 (H) 02/12/2018         Electronically signed by Emilee Eaton MD on 2/18/18 at Jonelle Jose M.D.

## 2018-02-19 ENCOUNTER — TELEPHONE (OUTPATIENT)
Dept: INTERNAL MEDICINE CLINIC | Age: 83
End: 2018-02-19

## 2018-02-19 PROCEDURE — 6370000000 HC RX 637 (ALT 250 FOR IP): Performed by: ORTHOPAEDIC SURGERY

## 2018-02-19 PROCEDURE — 97116 GAIT TRAINING THERAPY: CPT

## 2018-02-19 PROCEDURE — 97535 SELF CARE MNGMENT TRAINING: CPT

## 2018-02-19 PROCEDURE — 97530 THERAPEUTIC ACTIVITIES: CPT

## 2018-02-19 PROCEDURE — 1180000000 HC REHAB R&B

## 2018-02-19 PROCEDURE — 6360000002 HC RX W HCPCS: Performed by: ORTHOPAEDIC SURGERY

## 2018-02-19 PROCEDURE — 6370000000 HC RX 637 (ALT 250 FOR IP): Performed by: FAMILY MEDICINE

## 2018-02-19 PROCEDURE — 97110 THERAPEUTIC EXERCISES: CPT

## 2018-02-19 PROCEDURE — 6370000000 HC RX 637 (ALT 250 FOR IP): Performed by: PHYSICAL MEDICINE & REHABILITATION

## 2018-02-19 RX ORDER — CIPROFLOXACIN 250 MG/1
250 TABLET, FILM COATED ORAL EVERY 12 HOURS SCHEDULED
Status: COMPLETED | OUTPATIENT
Start: 2018-02-19 | End: 2018-02-24

## 2018-02-19 RX ADMIN — ENOXAPARIN SODIUM 30 MG: 30 INJECTION SUBCUTANEOUS at 21:43

## 2018-02-19 RX ADMIN — PHENYTOIN SODIUM 100 MG: 100 CAPSULE ORAL at 21:43

## 2018-02-19 RX ADMIN — FERROUS SULFATE TAB 325 MG (65 MG ELEMENTAL FE) 325 MG: 325 (65 FE) TAB at 08:03

## 2018-02-19 RX ADMIN — PHENYTOIN SODIUM 100 MG: 100 CAPSULE ORAL at 08:03

## 2018-02-19 RX ADMIN — DOCUSATE SODIUM 100 MG: 100 CAPSULE ORAL at 08:04

## 2018-02-19 RX ADMIN — OXYCODONE HYDROCHLORIDE AND ACETAMINOPHEN 250 MG: 500 TABLET ORAL at 16:49

## 2018-02-19 RX ADMIN — ENOXAPARIN SODIUM 30 MG: 30 INJECTION SUBCUTANEOUS at 08:04

## 2018-02-19 RX ADMIN — FERROUS SULFATE TAB 325 MG (65 MG ELEMENTAL FE) 325 MG: 325 (65 FE) TAB at 16:49

## 2018-02-19 RX ADMIN — CIPROFLOXACIN 250 MG: 250 TABLET, FILM COATED ORAL at 21:42

## 2018-02-19 RX ADMIN — OXYCODONE HYDROCHLORIDE AND ACETAMINOPHEN 250 MG: 500 TABLET ORAL at 08:03

## 2018-02-19 RX ADMIN — PHENYTOIN SODIUM 100 MG: 100 CAPSULE ORAL at 08:06

## 2018-02-19 RX ADMIN — TRAMADOL HYDROCHLORIDE 100 MG: 50 TABLET, FILM COATED ORAL at 08:04

## 2018-02-19 ASSESSMENT — PAIN DESCRIPTION - PAIN TYPE
TYPE: ACUTE PAIN
TYPE: SURGICAL PAIN
TYPE: SURGICAL PAIN

## 2018-02-19 ASSESSMENT — PAIN DESCRIPTION - PROGRESSION
CLINICAL_PROGRESSION: NOT CHANGED

## 2018-02-19 ASSESSMENT — PAIN SCALES - GENERAL
PAINLEVEL_OUTOF10: 6
PAINLEVEL_OUTOF10: 3
PAINLEVEL_OUTOF10: 0

## 2018-02-19 ASSESSMENT — PAIN DESCRIPTION - ONSET: ONSET: ON-GOING

## 2018-02-19 ASSESSMENT — PAIN DESCRIPTION - ORIENTATION
ORIENTATION: RIGHT

## 2018-02-19 ASSESSMENT — PAIN DESCRIPTION - LOCATION
LOCATION: HIP

## 2018-02-19 ASSESSMENT — PAIN DESCRIPTION - DESCRIPTORS: DESCRIPTORS: ACHING

## 2018-02-19 ASSESSMENT — PAIN DESCRIPTION - FREQUENCY: FREQUENCY: INTERMITTENT

## 2018-02-19 NOTE — PROGRESS NOTES
Subjective:  Chart Reviewed: Yes  Family / Caregiver Present: No  Subjective: Patient lying supine in bed. Agreeable to get OOB secondary to needing to use restroom. Pain:  Yes. Pain Assessment  Pain Level:  (Not asked to rate. No hip pain with rest reported. Minimal with weight bearing per patient. )  Pain Type: Surgical pain  Pain Location: Hip  Pain Orientation: Right       Social/Functional:  Lives With: Spouse  Type of Home:  ([facility])  Home Layout: One level  Home Access: Level entry  Home Equipment: Rolling walker, Cane     Objective:  Rolling to Left: Minimal assistance (to bring right LE into hook-lying. Patient placing pillow between knees. )  Rolling to Right: Unable to assess  Supine to Sit: Minimal assistance (to lift and guide distal LE over EOB. Patient using bilateral UE's on bed rails. )  Scooting: Stand by assistance    Transfers  Sit to Stand: Minimal Assistance (from elevated bed or raised toilet seat. )  Stand to sit: Minimal Assistance (to toilet seat or recliner chair. VC to advance right LE.  )  Stand Pivot Transfers: Contact guard assistance (using rolling walker. )         Ambulation 1  Surface: level tile  Device: Rolling Walker  Assistance: Contact guard assistance  Quality of Gait: Slow pace. Decreased weight bearing to right LE. Slight forward posture. Low endurance. Shortened step length left LE>   Distance: 12 ft x 1 , 15 ft x 1         Balance  Comments: Patient tolerated standing balance with UE support on walker as therapist assisted with pericare and donning pants. Activity Tolerance:  Activity Tolerance: Patient limited by pain; Patient limited by endurance  Activity Tolerance: Patient anxious during session. Expressed concern about 's well being at home. Assessment:   Body structures, Functions, Activity limitations: Decreased functional mobility , Decreased ROM, Decreased strength, Decreased safe awareness, Decreased balance, Decreased in and out of bed to go to the bathroom    Long term goal 2: Pt will perform t/f with Mod I from various surfaces to get up to go to the bathroom  Long term goal 3: Pt will amb with RW >= 50 ft, with RW, Mod I for home mobility. Long term goal 4: Pt will perform car t/f with CGA x 1 for discharge home   Long term goal 5: Pt will ambulate on/off elevator with RW, SBA, for access to condo.

## 2018-02-19 NOTE — PROGRESS NOTES
stay:   Your estimated length of stay is 14 days   Your insurance Coverage has been verified as follows:    Primary Insurance: Medicare   Deductible: $1340.00  Coverage: Active  Secondary Insurance: [employer]; MICHAEL Lai often cover co-pay amounts, but to ensure payment please contact your insurance company.     Alternative Resources: Please ask the  for more information [phone]

## 2018-02-19 NOTE — PROGRESS NOTES
[facility]  Recreational Therapy  Evaluation  Inpatient Rehabilitation Unit      Time Spent with Patient: 25 minutes    Date:  2/19/2018       Patient Name: Gladys Pfeiffer      MRN: [id number]       YOB: 1934 (80 y.o.)       Gender: female  Diagnosis: Status Post Right Hip Replacement   Referring Practitioner: Ordering and attending prov: Lord Lucy MD    RESTRICTIONS/PRECAUTIONS:  Restrictions/Precautions: Weight Bearing, General Precautions, Fall Risk  Vision: Impaired  Hearing: Within functional limits    PAIN: 0    SUBJECTIVE:  Pt lives with spouse in condo-no children -helpful neighbor     VISION:  Glasses to read   HEARING: Within Normal Limit    LEISURE INTERESTS:   Pt states she was cooking and cleaning but recently found a cleaning lady to come in to do the heavier housework- was able to get the groceries and they attended [facility] but  recently got out of hospital-she use to knit but states she doesn't anymore-pt keeps stating she just wants to go home and be with her -they do not have any children-they do have a supportive neighbor-pt gets anxious easily-pt disappointed in her lunch today and gave her a menu to have on hand for ordering-pt appreciative     BARRIERS TO LEISURE INTERESTS:    Decreased endurance    FIMS  Social Interaction: 6 - Patient requires medication for mood and/or effect    Patient Education  New Education Provided: Importance of Leisure, RT Plan of Care    Plan:  Continue to follow patient through this admission  Include patient in appropriate groups  See patient individually    Electronically signed by: ERMIAS Barraza  Date: 2/19/2018

## 2018-02-19 NOTE — PROGRESS NOTES
and 1-2 hand release for > 4 minutes to increase indep with cooking tasks. Short term goal 5: Pt will complete functional transfers with CGA and no vc for safety including to/from toilet. Long term goals  Time Frame for Long term goals : 2 weeks  Long term goal 1: Pt will complete simple meal prep while standing with SBA and min vc for safety to increase indep within home environment. Long term goal 2: Pt will complete functional mobility household distances with SBA and min vc for safety to increase indep with ADLs/IADLs in home environment.

## 2018-02-19 NOTE — PROGRESS NOTES
[facility] 60  INPATIENT OCCUPATIONAL THERAPY  [facility]  PROGRESS NOTE    Time:  Time In: 830  Time Out: 930  Timed Code Treatment Minutes: 60 Minutes  Minutes: 60          Date: 2018  Patient Name: Neville Banegas,   Gender: female      MRN: [id number]  : 1934  (80 y.o.)  Referring Practitioner: Ordering and attending prov: German Castillo MD  Diagnosis: Status Post Right Hip Replacement   Additional Pertinent Hx: R JADA on  by Dr Gordy Aquino    Restrictions/Precautions:  Restrictions/Precautions: Weight Bearing, General Precautions, Fall Risk    Right Lower Extremity Weight Bearing: Weight Bearing As Tolerated       Position Activity Restriction  Hip Precautions: No hip flexion > 90 degrees, No ADduction, No hip internal rotation  Other position/activity restrictions: Dr. Angelica Aquino THR: no bending >90, no crossing midline, no twisting/pivoring, no IR         Past Medical History:   Diagnosis Date    Drug-induced polyneuropathy ([facility] [address] 2016    Fall due to slipping on ice or snow 2014    History of colon cancer 2004    well diff. andeno    Hyperplastic colon polyp     Irritable bowel syndrome     Osteoarthritis     Seizures ([address] 1969      last seizure    Sprain and strain right ankle     Past Surgical History:   Procedure Laterality Date    CATARACT REMOVAL  2000    right  pajka    CATARACT REMOVAL      left and right    COLECTOMY  2004    low anterior resection    COLONOSCOPY  2011    colon ca 2004    neidich    HYSTERECTOMY  1988    tahbso    DE TOTAL HIP ARTHROPLASTY Right 2018    RIGHT HIP TOTAL ARTHROPLASTY performed by Amber Calix MD at [facility] 148: Pt. seated in recliner upon arrival agreeable to OT treatment, Pt. refusing shower and not wanting to dress, MIRELES provided education on role of OT and goals to increase independence to return home. Pt. very concerned about her husbands well being at home. EXTREMITY DRESSING 1 - Total assist with lower body dressing (Dep to don/doff B socks with No AD while maintaing hip precautions. ) 4 - Requires assist with buttons/zippers/shoelaces and/or assist with shoes only (Pt. donned shorts and socks with LHAE with instruction and cues to use, CGA while standing to pull shorts up) 6 Not Met and Continue   TOILETING 2 - Able to perform 1 task only (e.g. hygiene) 2 - Able to perform 1 task only (e.g. hygiene) 6 Not Met and Continue   TOILET TRANSFER 3 - Requires 25-49% assist getting off toilet 3 - Requires 25-49% assist getting off toilet 6 Not Met and Continue   TUB/SHOWER TRANSFER                       Not Applicable                     Activity Tolerance:  Activity Tolerance: Patient limited by fatigue;Patient limited by pain    Assessment:  Performance deficits / Impairments: Decreased functional mobility , Decreased ADL status, Decreased endurance, Decreased balance, Decreased strength, Decreased safe awareness, Decreased high-level IADLs  Assessment: Pt. has not met any goals due to short stay on rehab unit. Pt. requires encouragement and motivation to complete activities. Pt. is completing transfers with min A-CGA with cues for technique, she reqires min cues for hip precautions. MIRELES completed education on LHAE and pt. completed with cues and supervision. Pt. is walking short distances and becomes shakey and nervous at times. Pt. will benefit from continued OT services to increase indepence, strength and endurance for ADLs/IADLs  Prognosis: Good  Discharge Recommendations: Continue to assess pending progress    Patient Education:  Patient Education: OT POC, OT Role, Transfer safety, Walker safety, Hip precautions, self care safety, OT expectations on rehab, HEP.    Barriers to Learning: Anxiety     Equipment Recommendations:  Equipment Needed: Yes  Mobility Devices: ADL Assistive Devices  ADL Assistive Devices: Sock-Aid Hard, Long-handled Sponge, Long-handled Shoe

## 2018-02-19 NOTE — PROGRESS NOTES
[facility]  Diagnosis List for Inpatient Rehab facility (IRF) - Patient Assessment Instrument (DELORES)    Patient Name: Keron Naranjo        MRN: [id number]    : 1934  (80 y.o.)  Gender: female     Primary impairment requiring rehabilitation: 8.11 Unilateral Hip Fracture     Etiologic Diagnosis that led to the condition: Right closed displaced intertrochanteric femur fracture. Comorbid conditions affecting rehabilitation:  1. S/p RIGHT total hip arthroplasty by Dr. Lima Macias on 2018.  2. Fall closed displaced intertrochanteric femur fracture. 3. End-stage osteoarthritis, RIGHT hip. 4. Falls. 5. Gait instability. 6. Post operative anemia. 7. RIGHT shoulder pain. 8. Chronic LBBB . 9. Seizures on chronic dilantin. 10. *Drug induced polyneuropathy. 11. Tachycardia. 12. Hypovitaminosis D.  13. UTI with Proteus mirabilis and E. Coli. 14. Severe osteoporosis.     Patricia Romeo MD

## 2018-02-19 NOTE — PLAN OF CARE
Problem: DISCHARGE BARRIERS  Goal: Patient's continuum of care needs are met    Intervention: INVOLVE PATIENT/S.O. IN DISCHARGE PLANNING PROCESS  The patient suffered a fall at home, resulting in a right femur fracture which necessitated a JADA surgery,. See progress note for more information. Care plan reviewed with patient and she verbalizes understanding of the plan of care and contributes to goal setting.   NADIRA Fink

## 2018-02-19 NOTE — PROGRESS NOTES
[address] [address]  INPATIENT PHYSICAL THERAPY  PROGRESS NOTE  [facility] INPATIENT REHAB 7E - 7E-55/055-A    Time In: 1000  Time Out: 1100  Timed Code Treatment Minutes: 60 Minutes  Minutes: 60          Date: 2018  Patient Name: Azeb Middleton,  Gender:  female        MRN: [id number]  : 1934  (80 y.o.)     Referring Practitioner: Dr. Russell Smith   Diagnosis: S/p Total replacement of R hip   Additional Pertinent Hx: Patient is a 80year old female being seen s/p R JADA on 18. Patient has a history of previous falls and on 18 patient fell in bathroom and developed a R intertrochanteric fracture. Patient opted to have a JADA due to severe OA in the hip. Patient is FWB on the RLE. Patient has a history of peripheral neuropathy and anxiety     Past Medical History:   Diagnosis Date    Drug-induced polyneuropathy ([facility] [address] 2016    Fall due to slipping on ice or snow 2014    History of colon cancer 2004    well diff.  andeno    Hyperplastic colon polyp     Irritable bowel syndrome     Osteoarthritis     Seizures ([address] 1969      last seizure    Sprain and strain right ankle     Past Surgical History:   Procedure Laterality Date    CATARACT REMOVAL      right  pajka    CATARACT REMOVAL      left and right    COLECTOMY  2004    low anterior resection    COLONOSCOPY  2011    colon ca 2004    neidich    HYSTERECTOMY  1988    tahbso    [place] TOTAL HIP ARTHROPLASTY Right 2018    RIGHT HIP TOTAL ARTHROPLASTY performed by Haritha Russ MD at [facility]       Restrictions/Precautions:  Restrictions/Precautions: Weight Bearing, General Precautions, Fall Risk    Right Lower Extremity Weight Bearing: Weight Bearing As Tolerated       Position Activity Restriction  Hip Precautions: No hip flexion > 90 degrees, No ADduction, No hip internal rotation  Other position/activity restrictions: Dr. Angelita Reinoso THR: no bending >90, no crossing midline, no twisting/pivoring, no IR Subjective:  Chart Reviewed: Yes  Family / Caregiver Present: No  Subjective: Patient sitting in recliner chair upon arrival for PT. Agreeable to session , but declined to go to gym for exercises. Pain:  Yes. Pain Assessment  Pain Level: 3  Pain Type: Surgical pain  Pain Location: Hip  Pain Orientation: Right       Social/Functional:  Lives With: Spouse  [facility]  [facility])  Home Layout: One level  Home Access: Level entry  Home Equipment: Rolling walker, Cane     Objective:  Rolling to Left: Moderate assistance (with LE's in hook-lying and pillow between knees on hospital bed . Patient using bilateral UE on bed rail. )  Rolling to Right: Unable to assess  Supine to Sit: Moderate assistance (to lift and guide distal LE's over EOB . Patient lifting trunk.  )  Sit to Supine: Moderate assistance (to support trunk and lift LE's. )  Scooting: Stand by assistance    Transfers  Sit to Stand: Moderate Assistance (from recliner chair. )  Stand to sit: Minimal Assistance (to hospital bed. )  Stand Pivot Transfers: Contact guard assistance (using rolling walker. )         Ambulation 1  Surface: level tile  Device: Rolling Walker  Assistance: Contact guard assistance  Quality of Gait: Slow pace. Decreased weight bearing to right LE. Slight forward posture. Low endurance. Shortened step length left LE>   Distance: 10 ft x 1         Exercises:  Exercises  Comments: Patient completed the following therapeutic exercises: supine in hospital bed ankle pumps, quad and gluteal isometreics and short arc quads. 8-10 reps. Moderate Assistance for right heel slides. 6 reps. Activity Tolerance:  Activity Tolerance: Patient limited by pain; Patient limited by endurance  Activity Tolerance: Patient anxious during session. Expressed concern about 's well being at home. Assessment:   Body structures, Functions, Activity limitations: Decreased functional mobility , Decreased ROM, Decreased strength, perform bed mobility Min +1 to decrease risk of pressure ulcers    Short term goal 2: Patient will perform functional transfers Min +1, consistently, to sit in bedside chair   Short term goal 3: Patient will ambulate >= 25 ft x1 with  RW and CGA to progress to home mobility. Short term goal 4: Patient will perform car transfer min +1 for transportation needs. Short term goal 5: Pt to walk with RW on/off elevator, min +1 , for access to condo. Long term goals  NONE MET  Time Frame for Long term goals : 2-3 weeks   Long term goal 1: Pt will be Mod I with bed mobility so pt can get in and out of bed to go to the bathroom   Long term goal 2: Pt will perform t/f with Mod I from various surfaces to get up to go to the bathroom  Long term goal 3: Pt will amb with ' x1 for household amb with Mod I   Long term goal 4: Pt will perform car t/f with SBA x 1 for discharge home   Revised Long-Term Goals  Long term goals  Time Frame for Long term goals : 3 wks  Long term goal 1: Pt will be Mod I with bed mobility so pt can get in and out of bed to go to the bathroom    Long term goal 2: Pt will perform t/f with Mod I from various surfaces to get up to go to the bathroom  Long term goal 3: Pt will amb with RW >= 50 ft, with RW, Mod I for home mobility. Long term goal 4: Pt will perform car t/f with CGA x 1 for discharge home   Long term goal 5: Pt will ambulate on/off elevator with RW, SBA, for access to condo. Treatment session and note completed by Misty Ramirez PTA. Assessment and goal revision of Progress Note completed by Charles Toure, PT. Shima Morgan

## 2018-02-19 NOTE — PROGRESS NOTES
[address] [address]  Physical Medicine Case Management Assessment    [x] Inpatient Rehabilitation Unit      Patient Name: Nydia Bills        MRN: [id number]    : 1934  (80 y.o.)  Gender: female   Date of Admission: 2018  2:56 PM    Family/Social/Home Environment:  The patient and her spouse reside in a one floor condo located in the [address] [place] [address], accessed by elevator. They have lived there for many years and patient states that she does not want to live anywhere else. Prior to her recent fall that resulted in a right hip fracture, the patient was independent in all areas, and provided help as needed to her spouse. He was recently hospitalized and is being looked after by a friend/neighbor who lives in the complex. The couple have no children, and their support system appears to be very limited. Lives With: Spouse  Type of Home:  (Condo)  Home Layout: One level  Home Access: Level entry  Bathroom Shower/Tub: Walk-in shower  Bathroom Toilet: Handicap height  Bathroom Equipment: Grab bars in shower, Shower chair  Home Equipment: Rolling walker, Cane  Receives Help From: [employer]  ADL Assistance: Independent  Homemaking Assistance: Independent  Homemaking Responsibilities: Yes  Ambulation Assistance: Independent  Transfer Assistance: Independent  Active : No  Occupation: Retired  Additional Comments: Patient states she is I with all ADLs. States up until her 's recent hospitalization, he was doing all the grocery shopping and laundry as his \"exercise\". Patient states she is able to do these activities if necessary. patient's  currently has [facility] services.      Contact/Guardian Information: Emergency Contacts  Contact 1: Name: wilmar cramer  Contact 1: Number: [phone]  Contact 1: Relationship:     [employer] Utilized: None for patient, however, [address] [place] [address] is currently providing services to the spouse    Sexuality/Intimacy: No concerns expressed

## 2018-02-19 NOTE — PROGRESS NOTES
Physical Medicine & Rehabilitation  Progress Note      Chief Complaint:  Fall/RIGHT Hip Fx/RIGHT JADA    Subjective:  No pain at rest.  Had 2 BM yesterday. Has \"pulling\" in her RIGHT thigh and feeling of heaviness. No other specific concerns. Rehabilitation:  Physical therapy: FIMS:  Bed Mobility: Scooting: Stand by assistance    Transfers: Sit to Stand: Minimal Assistance (from elevated bed or raised toilet seat. )  Stand to sit: Minimal Assistance (to toilet seat or recliner chair. VC to advance right LE.  )  Stand Pivot Transfers: Contact guard assistance (using rolling walker. ), Ambulation 1  Surface: level tile  Device: Rolling Walker  Assistance: Contact guard assistance  Quality of Gait: Slow pace. Decreased weight bearing to right LE. Slight forward posture. Low endurance. Shortened step length left LE>   Distance: 12 ft x 1 , 15 ft x 1   Comments: Pt anxious when up, Stairs  # Steps : 4  Stairs Height: 6\"  Rails: Bilateral  Assistance: Contact guard assistance    FIMS: Bed, Chair, Wheel Chair: 3 - Requires 25-49% assistance to transfer  Walk: 1 - Total Assistance Walks/operates wheelchair < 50 feet OR requires two or more people OR patient performs < 25% of locomotion effort  Distance Walked: 10 ft  Wheel Chair: 2 - Maximal Assistance Requires up to [address] requires assistance of one person to walk/operate wheelchair between  feet, PT Equipment Recommendations  Equipment Needed: Yes (Pt has RW. Continue to assess need for wheelchair.), Assessment: Fair tolerance . Moderate assistance with bed mobility and transfers. Good carryover to maintain THR precautions. Anxious , but cooperative. Patient would benefit from continued skilled physical therapy to increase strength for improved functional mobility.      Occupational therapy: FIMS:  Eatin - Patient feeds self  Grooming:  (declined ambulating to BR to wash hands after toileting)  Bathin - Able to bathe 8-9 areas (assist to wash below B knees)  Dressing-Upper: 5 - Requires setup/supervision/cues and/or requires assist with presthesis/brace only (SBA to don shirt)  Dressing-Lower: 4 - Requires assist with buttons/zippers/shoelaces and/or assist with shoes only (Pt. donned shorts and socks with LHAE with instruction and cues to use, CGA while standing to pull shorts up)  Toiletin - Able to perform 1 task only (e.g. hygiene) (able to untie pants and lower to knee level only)  Toilet Transfer: 3 - Requires 25-49% assist getting off toilet,  , Assessment: Pt. has not met any goals due to short stay on rehab unit. Pt. requires encouragement and motivation to complete activities. Pt. is completing transfers with min A-CGA with cues for technique, she reqires min cues for hip precautions. MIRELES completed education on LHAE and pt. completed with cues and supervision. Pt. is walking short distances and becomes shakey and nervous at times.  Pt. will benefit from continued OT services to increase indepence, strength and endurance for ADLs/IADLs    Speech therapy: FIMS: Comprehension: 6 - Complex ideas 90% or device (hearing aid/glasses)  Expression: 6 - Device used to express complex ideas/needs  Social Interaction: 6 - Patient requires medication for mood and/or effect  Problem Solvin - Patient solves simple/routine tasks 75-90%+   Memory: 4 - Patient remembers 75-90%+ of the time    Review of Systems:  CONSTITUTIONAL:  positive for fatigue  EYES:  negative  HEENT:  negative  RESPIRATORY:  negative  CARDIOVASCULAR:  negative  GASTROINTESTINAL:  negative  GENITOURINARY:  negative  SKIN:  negative  HEMATOLOGIC/LYMPHATIC:  negative  MUSCULOSKELETAL:  positive for  myalgias, arthralgias, pain, joint swelling, stiff joints, decreased range of motion, muscle weakness and bone pain  NEUROLOGICAL:  positive for weakness and pain  BEHAVIOR/PSYCH:  positive for anxiety  10 point system review otherwise negative    Objective:  BP (!) 146/69 care.  2. Falls/Gait instability. 1. PT/OT. 1. Ambulated 15' at [facility] with RW.  3. Post operative anemia. 1.  Hgb 13.4 on 2/12 and 8.8 0n 2/15/2018. 2. Hgb 2/17 was 8.0. Repeat on 2/18 was 8.5.  3. BID iron supplements started 2/17. 4. Tachycardia. 1. EKG on 2/16/2018 showed sinus tachycardia at 101 with known chronic LBBB. 2. Likely due to anxiety and noted anemia. 3. Last 24 hours .  5. UTI with Proteus sp. and Enteric gram negative bacilli. 1. Monurol ordered. End 2/29. 6. Nutrition:  Consultation to dietician for nutritional counseling and recommendations. TotP 5.9, alb 2.9, Vitamin 25OH level of 26 on 2/17/2018. 1. Cholecalciferol. 7. Bladder: Urine malodorous, UA/Reflex ordered 2/17. Positive for UTI. 8. Bowel: Senna, colace, MOM Last BM 2/18 x 2.  1. Senna 4 tablets. 2. Glycolax daily. 3. Colace TID. 4. Decrease once having BMs. 8. Rehabilitation nursing will be involved for bowel, bladder, skin, and pain management. Nursing will also provide education and training to patient and family. 9. Prophylaxis:  DVT: Lovenox. GI: None. 10.  and case management consultations for coordination of care and discharge planning.     Chronic Problems:  1. RIGHT shoulder pain. 1. Pain control as above. 2. Chronic LBBB 2004. 1. Noted on EKG 2/16/2018. 3. Seizures on chronic dilantin.  Last in 1988. 1. Dose dilantin per home regimen. 2. Last Dilantin Free/Total 12.2 on 5/31/2017. 4. Drug induced polyneuropathy. 1. No current medications.     Labs:  1. 2/17     Infectious Disease:  1. UTI with Proteus sp. and Enteric gram negative bacilli.     Missed Therapy Time:  None     DME:      Urbano Gamez MD

## 2018-02-20 PROBLEM — B96.20 E. COLI UTI (URINARY TRACT INFECTION): Status: ACTIVE | Noted: 2018-02-20

## 2018-02-20 PROBLEM — L24.89 IRRITANT CONTACT DERMATITIS DUE TO OTHER AGENTS: Status: ACTIVE | Noted: 2018-02-20

## 2018-02-20 PROBLEM — N39.0 E. COLI UTI (URINARY TRACT INFECTION): Status: ACTIVE | Noted: 2018-02-20

## 2018-02-20 LAB
ORGANISM: ABNORMAL
ORGANISM: ABNORMAL
URINE CULTURE REFLEX: ABNORMAL

## 2018-02-20 PROCEDURE — 6370000000 HC RX 637 (ALT 250 FOR IP): Performed by: FAMILY MEDICINE

## 2018-02-20 PROCEDURE — 97116 GAIT TRAINING THERAPY: CPT

## 2018-02-20 PROCEDURE — 6370000000 HC RX 637 (ALT 250 FOR IP): Performed by: ORTHOPAEDIC SURGERY

## 2018-02-20 PROCEDURE — 6370000000 HC RX 637 (ALT 250 FOR IP): Performed by: PHYSICAL MEDICINE & REHABILITATION

## 2018-02-20 PROCEDURE — 97530 THERAPEUTIC ACTIVITIES: CPT

## 2018-02-20 PROCEDURE — 97110 THERAPEUTIC EXERCISES: CPT

## 2018-02-20 PROCEDURE — 99231 SBSQ HOSP IP/OBS SF/LOW 25: CPT | Performed by: FAMILY MEDICINE

## 2018-02-20 PROCEDURE — 1180000000 HC REHAB R&B

## 2018-02-20 PROCEDURE — 97535 SELF CARE MNGMENT TRAINING: CPT

## 2018-02-20 PROCEDURE — 6360000002 HC RX W HCPCS: Performed by: ORTHOPAEDIC SURGERY

## 2018-02-20 RX ORDER — POLYETHYLENE GLYCOL 3350 17 G/17G
17 POWDER, FOR SOLUTION ORAL DAILY PRN
Status: DISCONTINUED | OUTPATIENT
Start: 2018-02-20 | End: 2018-03-02 | Stop reason: HOSPADM

## 2018-02-20 RX ORDER — SENNA PLUS 8.6 MG/1
2 TABLET ORAL DAILY
Status: DISCONTINUED | OUTPATIENT
Start: 2018-02-21 | End: 2018-03-02 | Stop reason: HOSPADM

## 2018-02-20 RX ORDER — ACETAMINOPHEN 325 MG/1
650 TABLET ORAL
Status: DISCONTINUED | OUTPATIENT
Start: 2018-02-20 | End: 2018-03-02 | Stop reason: HOSPADM

## 2018-02-20 RX ORDER — DOCUSATE SODIUM 100 MG/1
100 CAPSULE, LIQUID FILLED ORAL 2 TIMES DAILY
Status: DISCONTINUED | OUTPATIENT
Start: 2018-02-20 | End: 2018-03-02 | Stop reason: HOSPADM

## 2018-02-20 RX ORDER — ACETAMINOPHEN 325 MG/1
650 TABLET ORAL EVERY 4 HOURS PRN
Status: DISCONTINUED | OUTPATIENT
Start: 2018-02-20 | End: 2018-03-02 | Stop reason: HOSPADM

## 2018-02-20 RX ADMIN — FERROUS SULFATE TAB 325 MG (65 MG ELEMENTAL FE) 325 MG: 325 (65 FE) TAB at 17:09

## 2018-02-20 RX ADMIN — OXYCODONE HYDROCHLORIDE AND ACETAMINOPHEN 250 MG: 500 TABLET ORAL at 07:59

## 2018-02-20 RX ADMIN — ENOXAPARIN SODIUM 30 MG: 30 INJECTION SUBCUTANEOUS at 07:59

## 2018-02-20 RX ADMIN — OXYCODONE HYDROCHLORIDE AND ACETAMINOPHEN 250 MG: 500 TABLET ORAL at 17:09

## 2018-02-20 RX ADMIN — PHENYTOIN SODIUM 100 MG: 100 CAPSULE ORAL at 08:00

## 2018-02-20 RX ADMIN — ACETAMINOPHEN 650 MG: 325 TABLET ORAL at 22:06

## 2018-02-20 RX ADMIN — CIPROFLOXACIN 250 MG: 250 TABLET, FILM COATED ORAL at 22:08

## 2018-02-20 RX ADMIN — ENOXAPARIN SODIUM 30 MG: 30 INJECTION SUBCUTANEOUS at 22:08

## 2018-02-20 RX ADMIN — CIPROFLOXACIN 250 MG: 250 TABLET, FILM COATED ORAL at 08:00

## 2018-02-20 RX ADMIN — PHENYTOIN SODIUM 100 MG: 100 CAPSULE ORAL at 22:07

## 2018-02-20 RX ADMIN — FERROUS SULFATE TAB 325 MG (65 MG ELEMENTAL FE) 325 MG: 325 (65 FE) TAB at 07:59

## 2018-02-20 RX ADMIN — ACETAMINOPHEN 650 MG: 325 TABLET ORAL at 07:59

## 2018-02-20 ASSESSMENT — PAIN DESCRIPTION - PAIN TYPE
TYPE: SURGICAL PAIN
TYPE: ACUTE PAIN;SURGICAL PAIN
TYPE: SURGICAL PAIN
TYPE: SURGICAL PAIN

## 2018-02-20 ASSESSMENT — PAIN DESCRIPTION - ORIENTATION
ORIENTATION: RIGHT

## 2018-02-20 ASSESSMENT — PAIN DESCRIPTION - FREQUENCY: FREQUENCY: INTERMITTENT

## 2018-02-20 ASSESSMENT — PAIN DESCRIPTION - LOCATION
LOCATION: HIP

## 2018-02-20 ASSESSMENT — PAIN SCALES - GENERAL
PAINLEVEL_OUTOF10: 0
PAINLEVEL_OUTOF10: 4
PAINLEVEL_OUTOF10: 0
PAINLEVEL_OUTOF10: 2
PAINLEVEL_OUTOF10: 4
PAINLEVEL_OUTOF10: 0
PAINLEVEL_OUTOF10: 0

## 2018-02-20 ASSESSMENT — PAIN DESCRIPTION - DIRECTION: RADIATING_TOWARDS: LEG

## 2018-02-20 ASSESSMENT — PAIN DESCRIPTION - DESCRIPTORS
DESCRIPTORS: ACHING;TIGHTNESS
DESCRIPTORS: ACHING
DESCRIPTORS: ACHING;TIGHTNESS
DESCRIPTORS: ACHING

## 2018-02-20 NOTE — PROGRESS NOTES
Patient: Maria Elena Murrietashorn  Unit/Bed: 7E-55/055-A  YOB: 1934  MRN: [id number] Acct: [de-identified]   Admitting Diagnosis: Status post total replacement of right hip [Z96.641]  Admit Date:  2/16/2018  Hospital Day: 4    Assessment:     Principal Problem:    Status post total replacement of right hip  Active Problems:    Partial seizure ([address]    Irritable bowel syndrome    Drug-induced polyneuropathy ([address]    Closed displaced intertrochanteric fracture of right femur (HCC)    Elevated BP without diagnosis of hypertension    Acute blood loss as cause of postoperative anemia    Irritant contact dermatitis due to other agents    E. coli UTI (urinary tract infection)  Resolved Problems:    * No resolved hospital problems. *      Plan:      continue the lotion for the dermatitis on her back  On ATB for the UTI  She is anxious but at baseline        Subjective:     Patient has no complaint of CP, SOB, or GI upset. Medication side effects: none    Scheduled Meds:   ciprofloxacin  250 mg Oral 2 times per day    fosfomycin tromethamine  3 g Oral Q3 Days    docusate sodium  100 mg Oral TID    polyethylene glycol  17 g Oral Daily    ferrous sulfate  325 mg Oral BID WC    And    vitamin C  250 mg Oral BID WC    senna  4 tablet Oral Daily    enoxaparin  30 mg Subcutaneous BID    phenytoin  100 mg Oral Every Other Day    phenytoin  100 mg Oral BID     Continuous Infusions:   PRN Meds:acetaminophen, traMADol **OR** traMADol, hydrOXYzine, magnesium hydroxide    Review of Systems  Pertinent items are noted in HPI. Objective:     Patient Vitals for the past 8 hrs:   BP Temp Temp src Pulse Resp SpO2 Weight   02/20/18 0731 139/62 98 °F (36.7 °C) Oral 82 18 91 % -   02/20/18 0126 - - - - - - 150 lb (68 kg)     I/O last 3 completed shifts: In: 480 [P.O.:480]  Out: -   No intake/output data recorded.     /62   Pulse 82   Temp 98 °F (36.7 °C) (Oral)   Resp 18   Ht 5' 7\" (1.702 m)   Wt 150 lb (68 kg)

## 2018-02-20 NOTE — PROGRESS NOTES
pain  Pain Location: Hip  Pain Orientation: Right  Pain Descriptors: Aching;Tightness       Objective  Overall Cognitive Status: Exceptions       Bed mobility  Sit to Supine: Moderate assistance    Transfers  Sit to stand: Contact guard assistance  Stand to sit: Contact guard assistance       Balance  Sitting Balance: Supervision  Standing Balance: Contact guard assistance     Time: <1 minutes, x3 events  Activity: ADLs  Comment: Pt. stood x3 events in prep to complete a task, however each time Pt. complained of too much pain and returned to seated position, Pt. unable to be persuaded to complete homemaking task, despite max cues and encouragment. Functional Mobility  Functional - Mobility Device: Rolling Walker  Assist Level: Contact guard assistance  Functional Mobility Comments: Pt. ambulated to from BR no LOB, slow pace        Activity Tolerance:  Activity Tolerance: Patient limited by pain  Activity Tolerance: Pt. refused continued therapy this session stating she was having too mush pain, educated patient on importance of therapy. Assessment:     Performance deficits / Impairments: Decreased functional mobility , Decreased ADL status, Decreased endurance, Decreased balance, Decreased strength, Decreased safe awareness, Decreased high-level IADLs  Prognosis: Good  Discharge Recommendations: Continue to assess pending progress    Patient Education:  Patient Education: t/f safety, HEP  Barriers to Learning: Anxiety     Equipment Recommendations:  Equipment Needed: Yes  Mobility Devices: ADL Assistive Devices  ADL Assistive Devices: Sock-Aid Hard, Long-handled Sponge, Long-handled [employer], Reacher    Safety:  Safety Devices in place: Yes  Type of devices:  All fall risk precautions in place, Call light within reach, Chair alarm in place, Left in chair, Patient at risk for falls, Gait belt    Plan:  Times per week: 5x/week x 90 minutes and 1x/week x 30 minutes  Current Treatment Recommendations:

## 2018-02-20 NOTE — PLAN OF CARE
Problem: Falls - Risk of  Goal: Absence of falls  Outcome: Met This Shift  Uses call light    Problem: Risk for Impaired Skin Integrity  Goal: Tissue integrity - skin and mucous membranes  Structural intactness and normal physiological function of skin and  mucous membranes. Outcome: Met This Shift  Intact with right hip incision approx. . Rash to mid back and per DR. Munson is heat rash. Problem: Nutrition  Goal: Optimal nutrition therapy  Outcome: Met This Shift  adequate    Problem: Bleeding:  Goal: Will show no signs and symptoms of excessive bleeding  Will show no signs and symptoms of excessive bleeding   Outcome: Met This Shift  No s/s    Problem: Discharge Planning:  Goal: Knowledge of discharge instructions  Knowledge of discharge instructions   Outcome: Ongoing  Inc process    Problem: Infection - Surgical Site:  Goal: Signs of wound healing will improve  Signs of wound healing will improve   Outcome: Met This Shift  Incision healing    Problem: Pain Control  Goal: Maintain pain level at or below patient's acceptable level (or 5 if patient is unable to determine acceptable level)  Outcome: Met This Shift  With pain meds    Problem: Pain:  Goal: Control of acute pain  Control of acute pain   Outcome: Met This Shift  using tylenol and ice  Goal: Control of chronic pain  Control of chronic pain   Outcome: Ongoing  Arthritis pain varies using tylenol    Problem: DISCHARGE BARRIERS  Goal: Patient's continuum of care needs are met  Outcome: Met This Shift  Needs met    Comments: Care plan reviewed with patient and  verbalize understanding of the plan of care and contribute to goal setting.

## 2018-02-20 NOTE — PROGRESS NOTES
Descriptors: Aching;Tightness       Objective  Overall Cognitive Status: Exceptions     Comment: Pt completed yellow theraband exercises x10 shoulder flexion, horizontal abduction, elbow flexion while seatd in the bed, completed to increase strength and endurance for ADLs. Activity Tolerance:  Activity Tolerance: Patient limited by pain  Activity Tolerance: Pt. refused continued therapy this session stating she was having too mush pain, educated patient on importance of therapy. Assessment:     Performance deficits / Impairments: Decreased functional mobility , Decreased ADL status, Decreased endurance, Decreased balance, Decreased strength, Decreased safe awareness, Decreased high-level IADLs  Prognosis: Good  Discharge Recommendations: Continue to assess pending progress    Patient Education:  Patient Education: t/f safety, HEP  Barriers to Learning: Anxiety     Equipment Recommendations:  Equipment Needed: Yes  Mobility Devices: ADL Assistive Devices  ADL Assistive Devices: Sock-Aid Hard, Long-handled Sponge, Long-handled [employer], Reacher    Safety:  Safety Devices in place: Yes  Type of devices:  All fall risk precautions in place, Call light within reach, Chair alarm in place, Left in chair, Patient at risk for falls, Gait belt    Plan:  Times per week: 5x/week x 90 minutes and 1x/week x 30 minutes  Current Treatment Recommendations: Strengthening, Balance Training, Functional Mobility Training, Endurance Training, Patient/Caregiver Education & Training, Self-Care / ADL, Safety Education & Training, Home Management Training, Equipment Evaluation, Education, & procurement    Goals:  Patient goals : \" I want to go home and be with my \"     Short term goals  Time Frame for Short term goals: 1 week  Short term goal 1: Pt will complete functional mobility to/from bathroom with CGA and RW with no vc for safety to increase indep with HH ambulation  Short term goal 2: Pt will complete BADL routine with mod A and LHAE with no vc for hip precautions to increase indep with ADL routine  Short term goal 3: Pt will complete BUE strengthening exercises to increase strength and endurance needed for ease with ADLs  Short term goal 4: Pt will complete standing ADL wtih CGA and 1-2 hand release for > 4 minutes to increase indep with cooking tasks. Short term goal 5: Pt will complete functional transfers with CGA and no vc for safety including to/from toilet. Long term goals  Time Frame for Long term goals : 2 weeks  Long term goal 1: Pt will complete simple meal prep while standing with SBA and min vc for safety to increase indep within home environment. Long term goal 2: Pt will complete functional mobility household distances with SBA and min vc for safety to increase indep with ADLs/IADLs in home environment.

## 2018-02-20 NOTE — PROGRESS NOTES
chronic dilantin.  Last in 1988. 1. Dose dilantin per home regimen. 2. Last Dilantin Free/Total 12.2 on 5/31/2017. 4. Drug induced polyneuropathy. 1. No current medications.     Labs:  1. 2/17     Infectious Disease:  1. UTI with UTI with Proteus mirabilis and E. coli.     Missed Therapy Time:  None     DME:      German Castillo MD

## 2018-02-21 PROCEDURE — 97535 SELF CARE MNGMENT TRAINING: CPT

## 2018-02-21 PROCEDURE — 97110 THERAPEUTIC EXERCISES: CPT

## 2018-02-21 PROCEDURE — 6370000000 HC RX 637 (ALT 250 FOR IP): Performed by: ORTHOPAEDIC SURGERY

## 2018-02-21 PROCEDURE — 97530 THERAPEUTIC ACTIVITIES: CPT

## 2018-02-21 PROCEDURE — 6370000000 HC RX 637 (ALT 250 FOR IP): Performed by: FAMILY MEDICINE

## 2018-02-21 PROCEDURE — 6370000000 HC RX 637 (ALT 250 FOR IP): Performed by: PHYSICAL MEDICINE & REHABILITATION

## 2018-02-21 PROCEDURE — 1180000000 HC REHAB R&B

## 2018-02-21 PROCEDURE — 97116 GAIT TRAINING THERAPY: CPT

## 2018-02-21 PROCEDURE — 6360000002 HC RX W HCPCS: Performed by: ORTHOPAEDIC SURGERY

## 2018-02-21 RX ADMIN — PHENYTOIN SODIUM 100 MG: 100 CAPSULE ORAL at 09:32

## 2018-02-21 RX ADMIN — OXYCODONE HYDROCHLORIDE AND ACETAMINOPHEN 250 MG: 500 TABLET ORAL at 19:20

## 2018-02-21 RX ADMIN — FERROUS SULFATE TAB 325 MG (65 MG ELEMENTAL FE) 325 MG: 325 (65 FE) TAB at 09:31

## 2018-02-21 RX ADMIN — PHENYTOIN SODIUM 100 MG: 100 CAPSULE ORAL at 09:31

## 2018-02-21 RX ADMIN — ACETAMINOPHEN 650 MG: 325 TABLET ORAL at 19:21

## 2018-02-21 RX ADMIN — ENOXAPARIN SODIUM 30 MG: 30 INJECTION SUBCUTANEOUS at 20:39

## 2018-02-21 RX ADMIN — ACETAMINOPHEN 650 MG: 325 TABLET ORAL at 06:27

## 2018-02-21 RX ADMIN — DOCUSATE SODIUM 100 MG: 100 CAPSULE ORAL at 20:39

## 2018-02-21 RX ADMIN — PHENYTOIN SODIUM 100 MG: 100 CAPSULE ORAL at 20:39

## 2018-02-21 RX ADMIN — FERROUS SULFATE TAB 325 MG (65 MG ELEMENTAL FE) 325 MG: 325 (65 FE) TAB at 19:20

## 2018-02-21 RX ADMIN — ACETAMINOPHEN 650 MG: 325 TABLET ORAL at 09:34

## 2018-02-21 RX ADMIN — CIPROFLOXACIN 250 MG: 250 TABLET, FILM COATED ORAL at 20:39

## 2018-02-21 RX ADMIN — OXYCODONE HYDROCHLORIDE AND ACETAMINOPHEN 250 MG: 500 TABLET ORAL at 09:30

## 2018-02-21 RX ADMIN — FOSFOMYCIN TROMETHAMINE 1 PACKET: 3 POWDER ORAL at 12:58

## 2018-02-21 RX ADMIN — ACETAMINOPHEN 650 MG: 325 TABLET ORAL at 14:57

## 2018-02-21 RX ADMIN — ENOXAPARIN SODIUM 30 MG: 30 INJECTION SUBCUTANEOUS at 09:31

## 2018-02-21 RX ADMIN — CIPROFLOXACIN 250 MG: 250 TABLET, FILM COATED ORAL at 09:30

## 2018-02-21 ASSESSMENT — PAIN SCALES - GENERAL
PAINLEVEL_OUTOF10: 5
PAINLEVEL_OUTOF10: 0
PAINLEVEL_OUTOF10: 8
PAINLEVEL_OUTOF10: 2
PAINLEVEL_OUTOF10: 5
PAINLEVEL_OUTOF10: 5
PAINLEVEL_OUTOF10: 0
PAINLEVEL_OUTOF10: 5

## 2018-02-21 ASSESSMENT — PAIN DESCRIPTION - ORIENTATION: ORIENTATION: RIGHT

## 2018-02-21 ASSESSMENT — PAIN DESCRIPTION - PAIN TYPE: TYPE: SURGICAL PAIN

## 2018-02-21 ASSESSMENT — PAIN DESCRIPTION - LOCATION: LOCATION: HIP

## 2018-02-21 NOTE — PROGRESS NOTES
of bed to go to the bathroom    Long term goal 2: Pt will perform t/f with Mod I from various surfaces to get up to go to the bathroom  Long term goal 3: Pt will amb with RW >= 50 ft, with RW, Mod I for home mobility. Long term goal 4: Pt will perform car t/f with CGA x 1 for discharge home   Long term goal 5: Pt will ambulate on/off elevator with RW, SBA, for access to condo.

## 2018-02-21 NOTE — PROGRESS NOTES
[address] [id number]  INPATIENT PHYSICAL THERAPY  DAILYNOTE  [facility]E - 7E-55/055-A    Time In: 1430  Time Out: 1505  Timed Code Treatment Minutes: 35 Minutes  Minutes: 35     Minute Variance  Variance: -5    Date: 2018  Patient Name: Maria Elena Buchanan,  Gender:  female        MRN: [id number]  : 1934  (80 y.o.)     Referring Practitioner: Dr. Wes Conway   Diagnosis: S/p Total replacement of R hip   Additional Pertinent Hx: Patient is a 80year old female being seen s/p R JADA on 18. Patient has a history of previous falls and on 18 patient fell in bathroom and developed a R intertrochanteric fracture. Patient opted to have a JADA due to severe OA in the hip. Patient is FWB on the RLE. Patient has a history of peripheral neuropathy and anxiety     Past Medical History:   Diagnosis Date    Drug-induced polyneuropathy ([address] 2016    Fall due to slipping on ice or snow 2014    History of colon cancer 2004    well diff.  andeno    Hyperplastic colon polyp     Irritable bowel syndrome     Osteoarthritis     Seizures ([address] 1969      last seizure    Sprain and strain right ankle     Past Surgical History:   Procedure Laterality Date    CATARACT REMOVAL  2000    right  pajka    CATARACT REMOVAL      left and right    COLECTOMY  2004    low anterior resection    COLONOSCOPY  2011    colon ca 2004    neidich    HYSTERECTOMY  1988    tahbso    MT TOTAL HIP ARTHROPLASTY Right 2018    RIGHT HIP TOTAL ARTHROPLASTY performed by Kamille Hogan MD at [facility]       Restrictions/Precautions:  Restrictions/Precautions: Weight Bearing, General Precautions, Fall Risk    Right Lower Extremity Weight Bearing: Weight Bearing As Tolerated       Position Activity Restriction  Hip Precautions: No hip flexion > 90 degrees, No ADduction, No hip internal rotation  Other position/activity restrictions: Dr. Clarisa Raygoza THR: no bending >90, no crossing midline, no twisting/pivoring, no IR         Subjective:     Subjective: Pts friends present at start of session. Pts friend greatly assistive in encoruaging pt in importance of therapy and to particpate. Pt left in bed for rest after session. Pain:  Yes.      7/10 right lateral thigh; pt medicated during session     Social/Functional:  Lives With: Spouse  Type of Home:  (Condo)  Home Layout: One level  Home Access: Level entry  Home Equipment: Rolling walker, Cane     Objective:  Sit to Supine: Maximum assistance (at von legs into room bed, pt controlled upper body )    Transfers  Sit to Stand: Contact guard assistance (pt tense and anxious but with good control; bs chair, w/c)  Stand to sit: Contact guard assistance (same; w/c and EOB )       Ambulation 1  Surface: level tile  Device: Rolling Walker  Assistance: Contact guard assistance  Quality of Gait: encoruagement to trial any distance this date ; cues for posture and to decrease WBing on walker and put more on legs   Distance: approx 12 feet x 1 and 5 feet x 1                           Exercises:  Exercises  Comments: seated LAQ, marches, HS curls and ball squeezes x 15 reps each bilateral for general strengthening and ROM in legs for gait training                                  Activity Tolerance:  Activity Tolerance: Patient Tolerated treatment well    Assessment: Body structures, Functions, Activity limitations: Decreased functional mobility , Decreased ROM, Decreased strength, Decreased safe awareness, Decreased balance, Decreased high-level IADLs, Decreased endurance  Assessment: Patient tolerated session fair. Patient required encouragement to continue participation with therapy. Patient limited by pain. Patient demos low motivation. Patient would benefit from continued skilled physical therapy to increase strength for improved functional mobility.    Prognosis: Good  Discharge Recommendations: Continue to assess pending progress    Patient

## 2018-02-21 NOTE — PROGRESS NOTES
- Patient feeds self  Groomin - Able to perform 3-4 tasks with touching help (SBA-CGA to complete oral care and comb and wash and dry hands)  Bathin - Able to bathe 8-9 areas (CGA while standing and assist to wash below B knees )  Dressing-Upper: 5 - Requires setup/supervision/cues and/or requires assist with presthesis/brace only  Dressing-Lower: 3 - Requires assist with 2-3 parts of dressing (Min A to don shorts using reacher, assist to don socks)  Toiletin - Requires setup/supervision/cues (close SBA while managing clothing)  Toilet Transfer: 4 - Requires steadying assistance only < 25% assist,  , Assessment: Pt. has not met any goals due to short stay on rehab unit. Pt. requires encouragement and motivation to complete activities. Pt. is completing transfers with min A-CGA with cues for technique, she reqires min cues for hip precautions. MIRELES completed education on LHAE and pt. completed with cues and supervision. Pt. is walking short distances and becomes shakey and nervous at times.  Pt. will benefit from continued OT services to increase indepence, strength and endurance for ADLs/IADLs    Speech therapy: FIMS: Comprehension: 6 - Complex ideas 90% or device (hearing aid/glasses)  Expression: 6 - Device used to express complex ideas/needs  Social Interaction: 6 - Patient requires medication for mood and/or effect  Problem Solvin - Patient solves simple/routine tasks 75-90%+   Memory: 4 - Patient remembers 75-90%+ of the time    Review of Systems:  CONSTITUTIONAL:  positive for fatigue  EYES:  negative  HEENT:  negative  RESPIRATORY:  negative  CARDIOVASCULAR:  negative  GASTROINTESTINAL:  negative  GENITOURINARY:  negative  SKIN:  negative  HEMATOLOGIC/LYMPHATIC:  negative  MUSCULOSKELETAL:  positive for  myalgias, arthralgias, pain, joint swelling, stiff joints, decreased range of motion, muscle weakness and bone pain  NEUROLOGICAL:  positive for weakness and pain  BEHAVIOR/PSYCH:  positive for anxiety  10 point system review otherwise negative    Objective:  BP (!) 141/67   Pulse 91   Temp 97.1 °F (36.2 °C) (Oral)   Resp 14   Ht 5' 7\" (1.702 m)   Wt 153 lb 12.8 oz (69.8 kg)   SpO2 98%   BMI 24.09 kg/m²     awake  Orientation:   person, place, time, situation  Mood: within normal limits  Affect: anxious  General appearance: in no acute distress, up in chair     Memory:   grossly normal  Attention/Concentration: normal  Language:  normal     ROM:  abnormal - RIGHT JADA  Motor Exam:  Motor exam is 4 out of 5 all extremities with the exception of RIGHT HF and KE     Tone:  Normal RIGHT leg not tested  Coordination:   Normal, RIGHT leg not tested  Deep Tendon Reflexes:  Reflexes are intact and symmetrical bilaterally, RIGHT leg not tested     Skin: warm and dry, no rash or erythema  Peripheral vascular: Pulses: Normal upper and lower extremity pulses; Edema: 1+    Diagnostics:   No results found for this or any previous visit (from the past 24 hour(s)). Impression:  1. S/p RIGHT total hip arthroplasty by Dr. Suresh Kidd on 02/14/2018.  2. Fall closed displaced intertrochanteric femur fracture. 3. End-stage osteoarthritis, RIGHT hip. 4. Falls. 5. Gait instability. 6. Post operative anemia. Hgb 13.4 on 2/12 and 8.8 0n 2/15/2018. 7. RIGHT shoulder pain. 8. Chronic LBBB 2004. 9. Seizures on chronic dilantin.  Last in 1988. 10. Drug induced polyneuropathy. 11. Tachycardia. 12. Hypovitaminosis D, Vitamin 25OH level of 26 on 2/17/2018. 13. UTI with UTI with Proteus mirabilis and E. coli.     Plan:      Medical management: Dr. Trent Bourne to follow.     Consultants: Orthopedic Surgery, Cardiology, Family Medicine, Physical Medicine     Acute/Rehabilitation Problems:  1. S/p RIGHT total hip arthroplasty by Dr. Suresh Kidd on 02/14/2018. 1. Pain control. 1. Tramadol. 2. Last 24 hour usage none. States that it makes her groggy. 3. Tylenol.   1. Will schedule q4H when awake with additional order for q4

## 2018-02-21 NOTE — PROGRESS NOTES
Objective  Overall Cognitive Status: Exceptions            Bed mobility  Supine to Sit: Minimal assistance (asisst with LLE to EOB)            Balance  Sitting Balance: Modified independent  (seated EOB)           Functional Mobility  Functional - Mobility Device: Rolling Walker  Assist Level: Contact guard assistance  Functional Mobility Comments: Pt. ambulated to from  no LOB, slow pace                                                                                                                                                                            Bathin - Able to bathe 8-9 areas (CGA while standing and assist to wash below B knees )                                                                              Dressing-Upper: 5 - Requires setup/supervision/cues and/or requires assist with presthesis/brace only                                                                               Dressing-Lower: 3 - Requires assist with 2-3 parts of dressing (Min A to don shorts using reacher, assist to don socks)                                                                               Toiletin - Requires setup/supervision/cues (close SBA while managing clothing)                                                                               Toilet Transfer: 4 - Requires steadying assistance only < 25% assist                                                                          Requires extra time and encouragement to complete ADLs    Min cues for hip precautions. Activity Tolerance:  Activity Tolerance: Patient limited by pain; Patient limited by fatigue  Activity Tolerance: Pt. required

## 2018-02-21 NOTE — PLAN OF CARE
Problem: Falls - Risk of  Goal: Absence of falls  Outcome: Met This Shift  No falls in the last 24 hrs. Pt uses call light to ask for assistance. Fall prevention program in place. Chair and bed alarms are in use. Problem: Risk for Impaired Skin Integrity  Goal: Tissue integrity - skin and mucous membranes  Structural intactness and normal physiological function of skin and  mucous membranes. Outcome: Met This Shift  Skin intact; no open areas. Incision is clean and dry and healing well. Problem: Bleeding:  Goal: Will show no signs and symptoms of excessive bleeding  Will show no signs and symptoms of excessive bleeding   Outcome: Met This Shift  No s/s active bleeding this shift. Problem: Pain Control  Goal: Maintain pain level at or below patient's acceptable level (or 5 if patient is unable to determine acceptable level)  Outcome: Met This Shift  States she doesn't have any pain as long as she doesn't move. Encouraged to get out of bed and participate in therapy to help her hip to heal. Encouraged to take the Tylenol around the clock when she is awake to stay ahead of the pain. Problem: Pain:  Goal: Pain level will decrease  Pain level will decrease   Outcome: Ongoing  Pt states pain control has been adequate this shift as long as she doesn't move. Goal: Control of acute pain  Control of acute pain   Outcome: Ongoing    Goal: Control of chronic pain  Control of chronic pain   Outcome: Met This Shift      Comments: Careplan reviewed with pt. Pt verbalizes understanding of the plan of care & contributes to goal setting.

## 2018-02-22 PROBLEM — S72.141A CLOSED DISPLACED INTERTROCHANTERIC FRACTURE OF RIGHT FEMUR (HCC): Status: RESOLVED | Noted: 2018-02-13 | Resolved: 2018-02-22

## 2018-02-22 PROBLEM — B96.4 URINARY TRACT INFECTION DUE TO PROTEUS: Status: ACTIVE | Noted: 2018-02-17

## 2018-02-22 PROBLEM — N39.0 URINARY TRACT INFECTION DUE TO PROTEUS: Status: ACTIVE | Noted: 2018-02-17

## 2018-02-22 PROCEDURE — 97530 THERAPEUTIC ACTIVITIES: CPT

## 2018-02-22 PROCEDURE — 97535 SELF CARE MNGMENT TRAINING: CPT

## 2018-02-22 PROCEDURE — 6370000000 HC RX 637 (ALT 250 FOR IP): Performed by: FAMILY MEDICINE

## 2018-02-22 PROCEDURE — 6360000002 HC RX W HCPCS: Performed by: ORTHOPAEDIC SURGERY

## 2018-02-22 PROCEDURE — 97116 GAIT TRAINING THERAPY: CPT

## 2018-02-22 PROCEDURE — 97110 THERAPEUTIC EXERCISES: CPT

## 2018-02-22 PROCEDURE — 99231 SBSQ HOSP IP/OBS SF/LOW 25: CPT | Performed by: FAMILY MEDICINE

## 2018-02-22 PROCEDURE — 1180000000 HC REHAB R&B

## 2018-02-22 PROCEDURE — 6370000000 HC RX 637 (ALT 250 FOR IP): Performed by: PHYSICAL MEDICINE & REHABILITATION

## 2018-02-22 PROCEDURE — 6370000000 HC RX 637 (ALT 250 FOR IP): Performed by: ORTHOPAEDIC SURGERY

## 2018-02-22 RX ADMIN — DOCUSATE SODIUM 100 MG: 100 CAPSULE ORAL at 20:48

## 2018-02-22 RX ADMIN — OXYCODONE HYDROCHLORIDE AND ACETAMINOPHEN 250 MG: 500 TABLET ORAL at 09:34

## 2018-02-22 RX ADMIN — ENOXAPARIN SODIUM 30 MG: 30 INJECTION SUBCUTANEOUS at 20:49

## 2018-02-22 RX ADMIN — ACETAMINOPHEN 650 MG: 325 TABLET ORAL at 06:20

## 2018-02-22 RX ADMIN — CIPROFLOXACIN 250 MG: 250 TABLET, FILM COATED ORAL at 20:48

## 2018-02-22 RX ADMIN — FERROUS SULFATE TAB 325 MG (65 MG ELEMENTAL FE) 325 MG: 325 (65 FE) TAB at 09:31

## 2018-02-22 RX ADMIN — ACETAMINOPHEN 650 MG: 325 TABLET ORAL at 12:59

## 2018-02-22 RX ADMIN — PHENYTOIN SODIUM 100 MG: 100 CAPSULE ORAL at 09:31

## 2018-02-22 RX ADMIN — PHENYTOIN SODIUM 100 MG: 100 CAPSULE ORAL at 20:48

## 2018-02-22 RX ADMIN — ACETAMINOPHEN 650 MG: 325 TABLET ORAL at 20:49

## 2018-02-22 RX ADMIN — ENOXAPARIN SODIUM 30 MG: 30 INJECTION SUBCUTANEOUS at 09:31

## 2018-02-22 RX ADMIN — FERROUS SULFATE TAB 325 MG (65 MG ELEMENTAL FE) 325 MG: 325 (65 FE) TAB at 18:17

## 2018-02-22 RX ADMIN — CIPROFLOXACIN 250 MG: 250 TABLET, FILM COATED ORAL at 09:31

## 2018-02-22 ASSESSMENT — PAIN SCALES - GENERAL
PAINLEVEL_OUTOF10: 10
PAINLEVEL_OUTOF10: 0
PAINLEVEL_OUTOF10: 1
PAINLEVEL_OUTOF10: 0
PAINLEVEL_OUTOF10: 7
PAINLEVEL_OUTOF10: 10
PAINLEVEL_OUTOF10: 10
PAINLEVEL_OUTOF10: 7

## 2018-02-22 ASSESSMENT — PAIN DESCRIPTION - PAIN TYPE
TYPE: SURGICAL PAIN

## 2018-02-22 ASSESSMENT — PAIN DESCRIPTION - ORIENTATION
ORIENTATION: RIGHT
ORIENTATION: RIGHT

## 2018-02-22 ASSESSMENT — PAIN DESCRIPTION - LOCATION
LOCATION: HIP

## 2018-02-22 ASSESSMENT — PAIN DESCRIPTION - FREQUENCY: FREQUENCY: INTERMITTENT

## 2018-02-22 ASSESSMENT — PAIN DESCRIPTION - DIRECTION: RADIATING_TOWARDS: LEG

## 2018-02-22 ASSESSMENT — PAIN DESCRIPTION - PROGRESSION: CLINICAL_PROGRESSION: NOT CHANGED

## 2018-02-22 ASSESSMENT — PAIN DESCRIPTION - DESCRIPTORS: DESCRIPTORS: ACHING

## 2018-02-22 ASSESSMENT — PAIN DESCRIPTION - ONSET: ONSET: ON-GOING

## 2018-02-22 NOTE — PROGRESS NOTES
Subjective:     Subjective: Patient sitting on EOB upon arrival, agreed to therapy but required min-mod encouragement to leave room and increase activities. Requesting to use bathroom at start of session. Pain:  Yes. Pain Assessment  Pain Assessment: 0-10  Pain Level: 7 (during ambulation )  Pain Type: Surgical pain  Pain Location: Hip  Pain Orientation: Right       Social/Functional:  Lives With: Spouse  Type of Home:  (Condo)  Home Layout: One level  Home Access: Level entry  Home Equipment: Rolling walker, Cane     Objective:  Supine to Sit: Minimal assistance (to assist at RLE)  Sit to Supine: Moderate assistance (to assist at RLE and lower trunk down onto pillows)  Scooting: Contact guard assistance;Minimal assistance (CGA-Min A to scoot forward and back in recliner and WC)    Transfers  Sit to Stand: Minimal Assistance (Min. A x1 from EOB and toilet; Mod. A from mat and WC; cues for hand placement, pt very anxious about activity)  Stand to sit: Minimal Assistance (Min. A x1 to recliner and WC;  CGA to toilet, cues for reaching back before sitting)     Ambulation 1  Surface: level tile  Device: Rolling Walker  Assistance: Contact guard assistance  Quality of Gait 2: Decreased veronica and step length on Reji LEs, step to gait pattern with decreased stance time on RLE,  forward flexed posture with cues for staying closer to walker. Required encouragement for increasing distance. Distance: 50 ft. x1 and 30 ft x1     Balance  Comments: Patient stood at AD whil pulling up pants and then at sink to wash hands with CGA,  mild unsteadiness but no LOB noted. Exercises:  Exercises  Comments: Patient completed supine Reji LE ankle pumps, glut sets, quad sets, heel slides (mod. A on RLE to complete), RLE hip ABD/ADD (min A to complete full range) x10 reps each. All for increased strength/endurance for improved functional mobility.       Activity Tolerance:  Activity Tolerance: Patient Tolerated treatment well;Patient limited by endurance; Patient limited by fatigue    Assessment: Body structures, Functions, Activity limitations: Decreased functional mobility , Decreased ROM, Decreased strength, Decreased safe awareness, Decreased balance, Decreased high-level IADLs, Decreased endurance  Assessment: Patient tolerated session fair, required increased amounts of encouragement to increase activities, is limited overall by decreased strength and endurance. Would benefit from additional skilled PT to improve functional mobility. Prognosis: Good  Discharge Recommendations: Continue to assess pending progress, Patient would benefit from continued therapy after discharge    Patient Education:  Patient Education: POC, bed mobility, transfers, gait training, strengthening, importance of therapy     Equipment Recommendations:  Equipment Needed: Yes (Pt has RW. Continue to assess need for wheelchair.)    Safety:  Type of devices: All fall risk precautions in place, Call light within reach, Left in chair, Chair alarm in place, Patient at risk for falls, Gait belt, Nurse notified    Plan:  Times per week: 5x/week 90 min, 1x/week 30 min   Times per day: Daily  Specific instructions for Next Treatment: continue bed mobility, transfer and gait training, BLE strengthening, review precautions  Current Treatment Recommendations: Strengthening, ROM, Balance Training, Functional Mobility Training, Transfer Training, Gait Training, Endurance Training, Home Exercise Program, Safety Education & Training, Patient/Caregiver Education & Training    Goals:  Patient goals :  To return home     Short term goals  Time Frame for Short term goals: 1 wk  Short term goal 1: Patient will perform bed mobility Min +1 to decrease risk of pressure ulcers    Short term goal 2: Patient will perform functional transfers Min +1, consistently, to sit in bedside chair   Short term goal 3: Patient will ambulate >= 25 ft x1 with  RW and CGA to progress to home mobility. Short term goal 4: Patient will perform car transfer min +1 for transportation needs. Short term goal 5: Pt to walk with RW on/off elevator, min +1 , for access to condo. Long term goals  Time Frame for Long term goals : 3 wks  Long term goal 1: Pt will be Mod I with bed mobility so pt can get in and out of bed to go to the bathroom    Long term goal 2: Pt will perform t/f with Mod I from various surfaces to get up to go to the bathroom  Long term goal 3: Pt will amb with RW >= 50 ft, with RW, Mod I for home mobility. Long term goal 4: Pt will perform car t/f with CGA x 1 for discharge home   Long term goal 5: Pt will ambulate on/off elevator with RW, SBA, for access to condo.

## 2018-02-22 NOTE — PLAN OF CARE
Problem: Falls - Risk of  Goal: Absence of falls  Outcome: Ongoing  Patient was free from falls this shift. Is on falling star program, staff members hourly rounding. Bed and chair alarms on at all times. Problem: Risk for Impaired Skin Integrity  Goal: Tissue integrity - skin and mucous membranes  Structural intactness and normal physiological function of skin and  mucous membranes.    Outcome: Ongoing  Mucous membranes pink and moist.       Problem: Nutrition  Goal: Optimal nutrition therapy  Outcome: Ongoing

## 2018-02-22 NOTE — PROGRESS NOTES
goals : 3 wks  Long term goal 1: Pt will be Mod I with bed mobility so pt can get in and out of bed to go to the bathroom    Long term goal 2: Pt will perform t/f with Mod I from various surfaces to get up to go to the bathroom  Long term goal 3: Pt will amb with RW >= 50 ft, with RW, Mod I for home mobility. Long term goal 4: Pt will perform car t/f with CGA x 1 for discharge home   Long term goal 5: Pt will ambulate on/off elevator with RW, SBA, for access to condo.

## 2018-02-22 NOTE — PLAN OF CARE
Problem: Falls - Risk of  Goal: Absence of falls  Outcome: Ongoing  Patient was free from falls this shift. Is on falling star program, staff members hourly rounding. Bed and chair alarms on at all times. Problem: Risk for Impaired Skin Integrity  Goal: Tissue integrity - skin and mucous membranes  Structural intactness and normal physiological function of skin and  mucous membranes. Outcome: Ongoing  Mucous membranes pink and moist. Skin warm dry intact. Surgical incision to rt hip. Problem: Nutrition  Goal: Optimal nutrition therapy  Outcome: Ongoing  See intake. Problem: Bleeding:  Goal: Will show no signs and symptoms of excessive bleeding  Will show no signs and symptoms of excessive bleeding   Outcome: Ongoing  No signs or symptoms of bleeding observed by this nurse or indicated via labs. DVT prophylaxis       Problem: Discharge Planning:  Goal: Knowledge of discharge instructions  Knowledge of discharge instructions   Outcome: Ongoing  Patient continuum of care needs being met. Patient involved in identifying barriers related to discharge. Problem: Infection - Surgical Site:  Goal: Signs of wound healing will improve  Signs of wound healing will improve   Outcome: Met This Shift  Patient shows no signs or symptoms of infection. Problem: Mobility - Impaired:  Goal: Achieve maximum mobility level  Achieve maximum mobility level   Outcome: Ongoing  Moves with assist of one to two and gait belt with walker. Problem: Pain Control  Goal: Maintain pain level at or below patient's acceptable level (or 5 if patient is unable to determine acceptable level)  Outcome: Ongoing  Has prescribed pain medication per provider order that is prescribed. Has refused to take twice today due to fear of dilantin interferring.  Explained to patient that       Problem: Pain:  Goal: Pain level will decrease  Pain level will decrease   Outcome: Completed Date Met: 02/22/18    Goal: Control of acute

## 2018-02-22 NOTE — PROGRESS NOTES
m)   Wt 152 lb 6.4 oz (69.1 kg)   SpO2 96%   BMI 23.87 kg/m²   General appearance: alert, appears stated age and cooperative  Lungs: clear to auscultation bilaterally  Heart: regular rate and rhythm, S1, S2 normal, no murmur, click, rub or gallop  Abdomen: soft, non-tender; bowel sounds normal; no masses,  no organomegaly  Extremities: extremities normal, atraumatic, no cyanosis or edema  Skin: Skin color, texture, turgor normal. No rashes or lesions  Neurologic: Grossly normal        Electronically signed by Jaspal Hunt MD on 2/22/2018 at 7:23 AM

## 2018-02-23 LAB
HCT VFR BLD CALC: 23.8 % (ref 37–47)
HEMOGLOBIN: 8.1 GM/DL (ref 12–16)

## 2018-02-23 PROCEDURE — 6370000000 HC RX 637 (ALT 250 FOR IP): Performed by: FAMILY MEDICINE

## 2018-02-23 PROCEDURE — 85014 HEMATOCRIT: CPT

## 2018-02-23 PROCEDURE — 85018 HEMOGLOBIN: CPT

## 2018-02-23 PROCEDURE — 6370000000 HC RX 637 (ALT 250 FOR IP): Performed by: PHYSICAL MEDICINE & REHABILITATION

## 2018-02-23 PROCEDURE — 36415 COLL VENOUS BLD VENIPUNCTURE: CPT

## 2018-02-23 PROCEDURE — 6370000000 HC RX 637 (ALT 250 FOR IP): Performed by: ORTHOPAEDIC SURGERY

## 2018-02-23 PROCEDURE — 97530 THERAPEUTIC ACTIVITIES: CPT

## 2018-02-23 PROCEDURE — 97110 THERAPEUTIC EXERCISES: CPT

## 2018-02-23 PROCEDURE — 6360000002 HC RX W HCPCS: Performed by: ORTHOPAEDIC SURGERY

## 2018-02-23 PROCEDURE — 97116 GAIT TRAINING THERAPY: CPT

## 2018-02-23 PROCEDURE — 1180000000 HC REHAB R&B

## 2018-02-23 PROCEDURE — 97535 SELF CARE MNGMENT TRAINING: CPT

## 2018-02-23 RX ADMIN — DOCUSATE SODIUM 100 MG: 100 CAPSULE ORAL at 21:41

## 2018-02-23 RX ADMIN — PHENYTOIN SODIUM 100 MG: 100 CAPSULE ORAL at 09:13

## 2018-02-23 RX ADMIN — OXYCODONE HYDROCHLORIDE AND ACETAMINOPHEN 250 MG: 500 TABLET ORAL at 09:15

## 2018-02-23 RX ADMIN — ACETAMINOPHEN 650 MG: 325 TABLET ORAL at 05:34

## 2018-02-23 RX ADMIN — ACETAMINOPHEN 650 MG: 325 TABLET ORAL at 21:42

## 2018-02-23 RX ADMIN — ENOXAPARIN SODIUM 30 MG: 30 INJECTION SUBCUTANEOUS at 09:14

## 2018-02-23 RX ADMIN — PHENYTOIN SODIUM 100 MG: 100 CAPSULE ORAL at 09:14

## 2018-02-23 RX ADMIN — CIPROFLOXACIN 250 MG: 250 TABLET, FILM COATED ORAL at 21:41

## 2018-02-23 RX ADMIN — FERROUS SULFATE TAB 325 MG (65 MG ELEMENTAL FE) 325 MG: 325 (65 FE) TAB at 09:14

## 2018-02-23 RX ADMIN — ACETAMINOPHEN 650 MG: 325 TABLET ORAL at 09:13

## 2018-02-23 RX ADMIN — PHENYTOIN SODIUM 100 MG: 100 CAPSULE ORAL at 21:41

## 2018-02-23 RX ADMIN — ACETAMINOPHEN 650 MG: 325 TABLET ORAL at 14:30

## 2018-02-23 RX ADMIN — ENOXAPARIN SODIUM 30 MG: 30 INJECTION SUBCUTANEOUS at 21:44

## 2018-02-23 RX ADMIN — DOCUSATE SODIUM 100 MG: 100 CAPSULE ORAL at 09:12

## 2018-02-23 RX ADMIN — FERROUS SULFATE TAB 325 MG (65 MG ELEMENTAL FE) 325 MG: 325 (65 FE) TAB at 18:05

## 2018-02-23 RX ADMIN — OXYCODONE HYDROCHLORIDE AND ACETAMINOPHEN 250 MG: 500 TABLET ORAL at 18:06

## 2018-02-23 RX ADMIN — SENNOSIDES 17.2 MG: 8.6 TABLET, FILM COATED ORAL at 09:12

## 2018-02-23 RX ADMIN — CIPROFLOXACIN 250 MG: 250 TABLET, FILM COATED ORAL at 10:46

## 2018-02-23 RX ADMIN — ACETAMINOPHEN 650 MG: 325 TABLET ORAL at 18:05

## 2018-02-23 ASSESSMENT — PAIN SCALES - GENERAL
PAINLEVEL_OUTOF10: 0
PAINLEVEL_OUTOF10: 7
PAINLEVEL_OUTOF10: 5
PAINLEVEL_OUTOF10: 0
PAINLEVEL_OUTOF10: 4
PAINLEVEL_OUTOF10: 0
PAINLEVEL_OUTOF10: 5
PAINLEVEL_OUTOF10: 6
PAINLEVEL_OUTOF10: 0
PAINLEVEL_OUTOF10: 0

## 2018-02-23 ASSESSMENT — PAIN DESCRIPTION - ORIENTATION
ORIENTATION: RIGHT

## 2018-02-23 ASSESSMENT — PAIN DESCRIPTION - LOCATION
LOCATION: HIP

## 2018-02-23 ASSESSMENT — PAIN DESCRIPTION - PAIN TYPE
TYPE: SURGICAL PAIN
TYPE: SURGICAL PAIN

## 2018-02-23 NOTE — PROGRESS NOTES
[facility] [address]  INPATIENT OCCUPATIONAL THERAPY  [facility]  DAILY NOTE    Time:  Time In: 1000  Time Out: 1100  Timed Code Treatment Minutes: 60 Minutes  Minutes: 60    Date: 2018  Patient Name: Gabriel Rudd,   Gender: female      Room: [facility]/055-A  MRN: [id number]  : 1934  (80 y.o.)  Referring Practitioner: Ordering and attending prov: Den Salcedo MD  Diagnosis: Status Post Right Hip Replacement   Additional Pertinent Hx: R JADA on  by Dr Gordy Harrison    Restrictions/Precautions:  Restrictions/Precautions: Weight Bearing, General Precautions, Fall Risk    Right Lower Extremity Weight Bearing: Weight Bearing As Tolerated       Position Activity Restriction  Hip Precautions: No hip flexion > 90 degrees, No ADduction, No hip internal rotation  Other position/activity restrictions: Dr. Emily Harrison THR: no bending >90, no crossing midline, no twisting/pivoring, no IR         Past Medical History:   Diagnosis Date    Drug-induced polyneuropathy ([address] 2016    Fall due to slipping on ice or snow 2014    History of colon cancer 2004    well diff.  andeno    Hyperplastic colon polyp     Irritable bowel syndrome     Osteoarthritis     Seizures ([address] 1969      last seizure    Sprain and strain right ankle     Past Surgical History:   Procedure Laterality Date    CATARACT REMOVAL  2000    right  pajka    CATARACT REMOVAL      left and right    COLECTOMY  2004    low anterior resection    COLONOSCOPY  2011    colon ca 2004    neidich    HYSTERECTOMY  1988    tahbso    [place] TOTAL HIP ARTHROPLASTY Right 2018    RIGHT HIP TOTAL ARTHROPLASTY performed by Shelley Mota MD at [address]: Patient sitting up in chair upon arrival, required max encouragement to participate stating \"I hope I don't have to do too much I'm really just exhausted\"  Comments: throughout session patient kept perseverating on not wanting to participate and Activity Tolerance:  Activity Tolerance: Patient limited by fatigue;Patient limited by pain  Activity Tolerance: Pt. required max encouragment throughut session, stating she is having too much pain, she didnt sleep well last night. Mul;tiple complaints throughout entire session    Assessment:     Performance deficits / Impairments: Decreased functional mobility , Decreased ADL status, Decreased endurance, Decreased balance, Decreased strength, Decreased safe awareness, Decreased high-level IADLs  Prognosis: Good  Discharge Recommendations: Continue to assess pending progress    Patient Education:  Patient Education: endurance, transfer training,   Barriers to Learning: Anxiety     Equipment Recommendations:  Equipment Needed: Yes  Mobility Devices: ADL Assistive Devices  ADL Assistive Devices: Sock-Aid Hard, Long-handled Sponge, Long-handled Shoe Horn, Reacher    Safety:  Safety Devices in place: Yes  Type of devices:  All fall risk precautions in place, Call light within reach, Patient at risk for falls, Gait belt, Left in bed, Nurse notified, Bed alarm in place    Plan:  Times per week: 5x/week x 90 minutes and 1x/week x 30 minutes  Current Treatment Recommendations: Strengthening, Balance Training, Functional Mobility Training, Endurance Training, Patient/Caregiver Education & Training, Self-Care / ADL, Safety Education & Training, Home Management Training, Equipment Evaluation, Education, & procurement    Goals:  Patient goals : \" I want to go home and be with my \"     Short term goals  Time Frame for Short term goals: 1 week  Short term goal 1: Pt will complete functional mobility to/from bathroom with CGA and RW with no vc for safety to increase indep with HH ambulation  Short term goal 2: Pt will complete BADL routine with mod A and LHAE

## 2018-02-23 NOTE — PROGRESS NOTES
[address] [address]  INPATIENT PHYSICAL THERAPY  DAILYNOTE  [facility] 7[facility] - 7E-55/055-A    Time In: 1430  Time Out: 9403  Timed Code Treatment Minutes: 60 Minutes  Minutes: 60          Date: 2018  Patient Name: Neville Banegas,  Gender:  female        MRN: [id number]  : 1934  (80 y.o.)     Referring Practitioner: Dr. Martinez Pascal   Diagnosis: S/p Total replacement of R hip   Additional Pertinent Hx: Patient is a 80year old female being seen s/p R JADA on 18. Patient has a history of previous falls and on 18 patient fell in bathroom and developed a R intertrochanteric fracture. Patient opted to have a JADA due to severe OA in the hip. Patient is FWB on the RLE. Patient has a history of peripheral neuropathy and anxiety     Past Medical History:   Diagnosis Date    Drug-induced polyneuropathy ([address] 2016    Fall due to slipping on ice or snow 2014    History of colon cancer 2004    well diff.  andeno    Hyperplastic colon polyp     Irritable bowel syndrome     Osteoarthritis     Seizures ([address] 1969      last seizure    Sprain and strain right ankle     Past Surgical History:   Procedure Laterality Date    CATARACT REMOVAL      right  pajka    CATARACT REMOVAL      left and right    COLECTOMY  2004    low anterior resection    COLONOSCOPY  2011    colon ca 2004    neidich    HYSTERECTOMY  1988    tahbso    [place] TOTAL HIP ARTHROPLASTY Right 2018    RIGHT HIP TOTAL ARTHROPLASTY performed by Amber Calix MD at [facility]       Restrictions/Precautions:  Restrictions/Precautions: Weight Bearing, General Precautions, Fall Risk    Right Lower Extremity Weight Bearing: Weight Bearing As Tolerated       Position Activity Restriction  Hip Precautions: No hip flexion > 90 degrees, No ADduction, No hip internal rotation  Other position/activity restrictions: Dr. Angelica Aquino THR: no bending >90, no crossing midline, no twisting/pivoring, no IR

## 2018-02-23 NOTE — PROGRESS NOTES
[facility]  INPATIENT PHYSICAL THERAPY  DAILYNOTE  [facility] INPATIENT REHAB 7E - 7E-55/055-A    Time In: 0800  Time Out: 0830  Timed Code Treatment Minutes: 30 Minutes  Minutes: 30          Date: 2018  Patient Name: Emmy Paez,  Gender:  female        MRN: [id number]  : 1934  (80 y.o.)     Referring Practitioner: Dr. Alpesh Jade   Diagnosis: S/p Total replacement of R hip   Additional Pertinent Hx: Patient is a 80year old female being seen s/p R JADA on 18. Patient has a history of previous falls and on 18 patient fell in bathroom and developed a R intertrochanteric fracture. Patient opted to have a JADA due to severe OA in the hip. Patient is FWB on the RLE. Patient has a history of peripheral neuropathy and anxiety     Past Medical History:   Diagnosis Date    [address]-induced polyneuropathy ([facility] 2016    Fall due to slipping on ice or snow 2014    History of colon cancer 2004    well diff.  andeno    Hyperplastic colon polyp     Irritable bowel syndrome     Osteoarthritis     Seizures ([address] 1969      last seizure    Sprain and strain right ankle     Past Surgical History:   Procedure Laterality Date    CATARACT REMOVAL      right  pajka    CATARACT REMOVAL      left and right    COLECTOMY  2004    low anterior resection    COLONOSCOPY  2011    colon ca 2004    neidich    HYSTERECTOMY  1988    tahbso    [place] TOTAL HIP ARTHROPLASTY Right 2018    RIGHT HIP TOTAL ARTHROPLASTY performed by Fior Wilhelm MD at [place] [facility]       Restrictions/Precautions:  Restrictions/Precautions: Weight Bearing, General Precautions, Fall Risk    Right Lower Extremity Weight Bearing: Weight Bearing As Tolerated       Position Activity Restriction  Hip Precautions: No hip flexion > 90 degrees, No ADduction, No hip internal rotation  Other position/activity restrictions: Dr. Blank Dense THR: no bending >90, no crossing midline, no twisting/pivoring, no IR transfers, bed mobility    Equipment Recommendations:  Equipment Needed: Yes (Pt has RW. Continue to assess need for wheelchair.)    Safety:  Type of devices: All fall risk precautions in place, Call light within reach, Left in chair, Chair alarm in place    Plan:  Times per week: 5x/week 90 min, 1x/week 30 min   Times per day: Daily  Specific instructions for Next Treatment: continue bed mobility, transfer and gait training, BLE strengthening, review precautions  Current Treatment Recommendations: Strengthening, ROM, Balance Training, Functional Mobility Training, Transfer Training, Gait Training, Endurance Training, Home Exercise Program, Safety Education & Training, Patient/Caregiver Education & Training    Goals:  Patient goals : To return home     Short term goals  Time Frame for Short term goals: 1 wk  Short term goal 1: Patient will perform bed mobility Min +1 to decrease risk of pressure ulcers    Short term goal 2: Patient will perform functional transfers Min +1, consistently, to sit in bedside chair   Short term goal 3: Patient will ambulate >= 25 ft x1 with  RW and CGA to progress to home mobility. Short term goal 4: Patient will perform car transfer min +1 for transportation needs. Short term goal 5: Pt to walk with RW on/off elevator, min +1 , for access to condo. Long term goals  Time Frame for Long term goals : 3 wks  Long term goal 1: Pt will be Mod I with bed mobility so pt can get in and out of bed to go to the bathroom    Long term goal 2: Pt will perform t/f with Mod I from various surfaces to get up to go to the bathroom  Long term goal 3: Pt will amb with RW >= 50 ft, with RW, Mod I for home mobility. Long term goal 4: Pt will perform car t/f with CGA x 1 for discharge home   Long term goal 5: Pt will ambulate on/off elevator with RW, SBA, for access to condo.

## 2018-02-23 NOTE — PROGRESS NOTES
Physical Medicine & Rehabilitation  Progress Note      Chief Complaint:  Fall/RIGHT Hip Fx/RIGHT JADA    Subjective: Achy from the weather today. \"I did really well today with therapy. \"  Anxious to get home to . No other specific concerns. Rehabilitation:  Physical therapy: FIMS:  Bed Mobility: Scooting: Stand by assistance    Transfers: Sit to Stand: Moderate Assistance (from [facility]. Minimal Assist from elevated EOB. )  Stand to sit: Contact guard assistance (for steadying. )  Stand Pivot Transfers: Contact guard assistance (using rolling walker. CGA for steadying. )  Comment: Reviewed THR precautions with demonstration by therapist. , Ambulation 1  Surface: level tile  Device: Rolling Walker  Assistance: Contact guard assistance  Quality of Gait: Slow pace. One foot passing the other by 1/2 foot length. Downward gaze. Decreased weight shift to right LE with increased weight bearing through UE's. Distance: 35 ft x 2   Comments: ambulated within hospital room. , Stairs  # Steps : 4  Stairs Height: 6\"  Rails: Bilateral  Assistance: Contact guard assistance    FIMS: Bed, Chair, Wheel Chair: 3 - Requires 25-49% assistance to transfer  Walk: 1 - Total Assistance Walks/operates wheelchair < 50 feet OR requires two or more people OR patient performs < 25% of locomotion effort  Distance Walked: 35 ft  Wheel Chair: 2 - Maximal Assistance Requires up to [address] requires assistance of one person to walk/operate wheelchair between  feet, PT Equipment Recommendations  Equipment Needed: Yes (Pt has RW. Continue to assess need for wheelchair.), Assessment: Steadily improving with bed mobility, distance of ambulation and confidence with mobility.       Occupational therapy: FIMS:  Eatin - Feeds self with setup/supervision/cues and/or requires only setup/supervision/cues to perform tube feedings  Groomin - Able to perform 3-4 tasks with touching help (requested asssist for combing hair system review otherwise negative    Objective:  BP (!) 115/48   Pulse 87   Temp 98 °F (36.7 °C) (Oral)   Resp 15   Ht 5' 7\" (1.702 m)   Wt 152 lb 6.4 oz (69.1 kg)   SpO2 95%   BMI 23.87 kg/m²     awake  Orientation:   person, place, time, situation  Mood: within normal limits  Affect: anxious  General appearance: in no acute distress, up in [facility] in [facility]     Memory:   grossly normal  Attention/Concentration: normal  Language:  normal     ROM:  abnormal - RIGHT JADA  Motor Exam:  Motor exam is 4 out of 5 all extremities with the exception of RIGHT HF and KE     Tone:  Normal RIGHT leg not tested  Coordination:   Normal, RIGHT leg not tested  Deep Tendon Reflexes:  Reflexes are intact and symmetrical bilaterally, RIGHT leg not tested     Skin: warm and dry, no rash or erythema  Peripheral vascular: Pulses: Normal upper and lower extremity pulses; Edema: 1+    Diagnostics:   Recent Results (from the past 24 hour(s))   Hemoglobin and Hematocrit, Blood    Collection Time: 02/23/18  6:05 AM   Result Value Ref Range    Hemoglobin 8.1 (L) 12.0 - 16.0 gm/dl    Hematocrit 23.8 (L) 37.0 - 47.0 %     Impression:  1. S/p RIGHT total hip arthroplasty by Dr. Edison Saleh on 02/14/2018.  2. Fall closed displaced intertrochanteric femur fracture. 3. End-stage osteoarthritis, RIGHT hip. 4. Falls. 5. Gait instability. 6. Post operative anemia. Hgb 13.4 on 2/12 and 8.8 0n 2/15/2018. 7. RIGHT shoulder pain. 8. Chronic LBBB 2004. 9. Seizures on chronic dilantin.  Last in 1988. 10. Drug induced polyneuropathy. 11. Tachycardia. 12. Hypovitaminosis D, Vitamin 25OH level of 26 on 2/17/2018. 13. UTI with Proteus mirabilis and E. Coli. 14. Severe osteoporosis.     Plan:      Medical management: Dr. Radha Engle to follow.     Consultants: Orthopedic Surgery, Cardiology, Family Medicine, Physical Medicine     Acute/Rehabilitation Problems:  1. S/p RIGHT total hip arthroplasty by Dr. Edison Saleh on 02/14/2018.   1. Pain control. 1. Tramadol. 1. Last 3 day usage none. States that it makes her groggy. Discontinued on 2/22. 2. Tylenol. 1. Will schedule q4H when awake with additional order for q4 PRN. 2. DVT PPx.  1. 21 days on Lovenox per Dr. Shara Rogers protocol. End 3/8  3. Wound care. 2. Falls/Gait instability. 1. PT/OT. 1. Ambulated 48' and 30' at [facility] with RW. 4 6\" steps. 3. Post operative anemia. 1.  Hgb 13.4 on 2/12 and 8.8 0n 2/15/2018. 2. Hgb 2/17 was 8.0. Repeat on 2/18 was 8.5.  3. BID iron supplements started 2/17. 4. Hgb 8.1 on 2/23. 4. UTI with Proteus mirabilis and E. coli. 1. Monurol ordered. End 2/29. 2. Dr. Robert Mejia ordered ciprofloxacin to which the Proteus is not sensitive; but Monurol will cover. 3. Repeat UA on 2/25. Order placed. 5. Nutrition:  Consultation to dietician for nutritional counseling and recommendations. TotP 5.9, alb 2.9, Vitamin 25OH level of 26 on 2/17/2018. 1. Cholecalciferol. 6. Bladder: Urine malodorous, UA/Reflex ordered 2/17. Positive for UTI. 7. Bowel: Senna, colace, MOM Last BM 2/22. 1. Senna 4 tablets. 2. Glycolax daily. 3. Colace TID. 4. Decrease once having BMs. De-escalated bowel medications on 2/20.  8. Rehabilitation nursing will be involved for bowel, bladder, skin, and pain management. Nursing will also provide education and training to patient and family. 9. Prophylaxis:  DVT: Lovenox. GI: None. 10.  and case management consultations for coordination of care and discharge planning. Estimated Length of Stay: 3/2/2018  Destination: home health  Appropriate to trial functional independence apartment stay: No   Pass: No  Services at Discharge: [email or web address], Occupational Therapy, Nursing and an Aide 3x week  Equipment at Discharge: Hip kit    Chronic Problems:  1. RIGHT shoulder pain. 1. Pain control as above. 2. Chronic LBBB 2004. 1. Noted on EKG 2/16/2018. 3. Seizures on chronic dilantin.  Last in 1988.   1. Dose

## 2018-02-23 NOTE — PLAN OF CARE
about home health therapy services for her and her . Questions have been answered by all staff multiple times & reassurance given. Comments: Careplan reviewed with pt. Pt verbalizes understanding of the plan of care & contributes to goal setting.

## 2018-02-24 PROCEDURE — 6370000000 HC RX 637 (ALT 250 FOR IP): Performed by: PHYSICAL MEDICINE & REHABILITATION

## 2018-02-24 PROCEDURE — 6370000000 HC RX 637 (ALT 250 FOR IP): Performed by: FAMILY MEDICINE

## 2018-02-24 PROCEDURE — 97110 THERAPEUTIC EXERCISES: CPT

## 2018-02-24 PROCEDURE — 6370000000 HC RX 637 (ALT 250 FOR IP): Performed by: ORTHOPAEDIC SURGERY

## 2018-02-24 PROCEDURE — 97116 GAIT TRAINING THERAPY: CPT

## 2018-02-24 PROCEDURE — 97535 SELF CARE MNGMENT TRAINING: CPT

## 2018-02-24 PROCEDURE — 6360000002 HC RX W HCPCS: Performed by: ORTHOPAEDIC SURGERY

## 2018-02-24 PROCEDURE — 97530 THERAPEUTIC ACTIVITIES: CPT

## 2018-02-24 PROCEDURE — 99231 SBSQ HOSP IP/OBS SF/LOW 25: CPT | Performed by: FAMILY MEDICINE

## 2018-02-24 PROCEDURE — 1180000000 HC REHAB R&B

## 2018-02-24 RX ADMIN — FERROUS SULFATE TAB 325 MG (65 MG ELEMENTAL FE) 325 MG: 325 (65 FE) TAB at 17:42

## 2018-02-24 RX ADMIN — ACETAMINOPHEN 650 MG: 325 TABLET ORAL at 17:42

## 2018-02-24 RX ADMIN — PHENYTOIN SODIUM 100 MG: 100 CAPSULE ORAL at 20:47

## 2018-02-24 RX ADMIN — ENOXAPARIN SODIUM 30 MG: 30 INJECTION SUBCUTANEOUS at 08:49

## 2018-02-24 RX ADMIN — DOCUSATE SODIUM 100 MG: 100 CAPSULE ORAL at 20:47

## 2018-02-24 RX ADMIN — FERROUS SULFATE TAB 325 MG (65 MG ELEMENTAL FE) 325 MG: 325 (65 FE) TAB at 08:48

## 2018-02-24 RX ADMIN — CIPROFLOXACIN 250 MG: 250 TABLET, FILM COATED ORAL at 08:47

## 2018-02-24 RX ADMIN — ENOXAPARIN SODIUM 30 MG: 30 INJECTION SUBCUTANEOUS at 20:46

## 2018-02-24 RX ADMIN — ACETAMINOPHEN 650 MG: 325 TABLET ORAL at 13:37

## 2018-02-24 RX ADMIN — PHENYTOIN SODIUM 100 MG: 100 CAPSULE ORAL at 08:49

## 2018-02-24 RX ADMIN — OXYCODONE HYDROCHLORIDE AND ACETAMINOPHEN 250 MG: 500 TABLET ORAL at 17:42

## 2018-02-24 RX ADMIN — ACETAMINOPHEN 650 MG: 325 TABLET ORAL at 05:31

## 2018-02-24 RX ADMIN — DOCUSATE SODIUM 100 MG: 100 CAPSULE ORAL at 08:48

## 2018-02-24 RX ADMIN — OXYCODONE HYDROCHLORIDE AND ACETAMINOPHEN 250 MG: 500 TABLET ORAL at 08:47

## 2018-02-24 RX ADMIN — VITAMIN D, TAB 1000IU (100/BT) 5000 UNITS: 25 TAB at 13:37

## 2018-02-24 RX ADMIN — ACETAMINOPHEN 650 MG: 325 TABLET ORAL at 08:49

## 2018-02-24 ASSESSMENT — PAIN SCALES - GENERAL
PAINLEVEL_OUTOF10: 3
PAINLEVEL_OUTOF10: 0
PAINLEVEL_OUTOF10: 2
PAINLEVEL_OUTOF10: 2
PAINLEVEL_OUTOF10: 0
PAINLEVEL_OUTOF10: 3
PAINLEVEL_OUTOF10: 0
PAINLEVEL_OUTOF10: 4
PAINLEVEL_OUTOF10: 7
PAINLEVEL_OUTOF10: 2
PAINLEVEL_OUTOF10: 3
PAINLEVEL_OUTOF10: 6
PAINLEVEL_OUTOF10: 4

## 2018-02-24 ASSESSMENT — PAIN DESCRIPTION - ORIENTATION
ORIENTATION: RIGHT

## 2018-02-24 ASSESSMENT — PAIN DESCRIPTION - PAIN TYPE
TYPE: SURGICAL PAIN

## 2018-02-24 ASSESSMENT — PAIN DESCRIPTION - LOCATION
LOCATION: HIP

## 2018-02-24 ASSESSMENT — PAIN DESCRIPTION - DESCRIPTORS: DESCRIPTORS: ACHING

## 2018-02-24 NOTE — PROGRESS NOTES
[facility]  INPATIENT PHYSICAL THERAPY  DAILYNOTE  [facility] INPATIENT REHAB 7E - 7E-55/055-A    Time In: 0930  Time Out: 1000  Timed Code Treatment Minutes: 30 Minutes  Minutes: 30          Date: 2018  Patient Name: Gabriel Rudd,  Gender:  female        MRN: [id number]  : 1934  (80 y.o.)     Referring Practitioner: Dr. Miesha Engel   Diagnosis: S/p Total replacement of R hip   Additional Pertinent Hx: Patient is a 80year old female being seen s/p R JADA on 18. Patient has a history of previous falls and on 18 patient fell in bathroom and developed a R intertrochanteric fracture. Patient opted to have a JADA due to severe OA in the hip. Patient is FWB on the RLE. Patient has a history of peripheral neuropathy and anxiety     Past Medical History:   Diagnosis Date    Drug-induced polyneuropathy ([facility] 2016    Fall due to slipping on ice or snow 2014    History of colon cancer 2004    well diff.  andeno    Hyperplastic colon polyp     Irritable bowel syndrome     Osteoarthritis     Seizures ([address] 1969      last seizure    Sprain and strain right ankle     Past Surgical History:   Procedure Laterality Date    CATARACT REMOVAL      right  pajka    CATARACT REMOVAL      left and right    COLECTOMY  2004    low anterior resection    COLONOSCOPY  2011    colon ca 2004    neidich    HYSTERECTOMY  1988    tahbso    [place] TOTAL HIP ARTHROPLASTY Right 2018    RIGHT HIP TOTAL ARTHROPLASTY performed by Shelley Mota MD at [facility]       Restrictions/Precautions:  Restrictions/Precautions: Weight Bearing, General Precautions, Fall Risk    Right Lower Extremity Weight Bearing: Weight Bearing As Tolerated       Position Activity Restriction  Hip Precautions: No hip flexion > 90 degrees, No ADduction, No hip internal rotation  Other position/activity restrictions: Dr. Emily Harrison THR: no bending >90, no crossing midline, no twisting/pivoring, no IR Subjective:     Subjective: pt up in chair on arrival and was c/o of being sore and tired she reported that she had a lot of therapy yesterday discussed her weekly therapy schedule and the requirements for rehab however this therapy will help her meet her goals of going home and she did agree to this, pt requested to return to bed following therapy    Pain:   .  Pain Assessment  Pain Level: 6 (pt not due for pain meds)       Social/Functional:  Lives With: Spouse  Type of Home:  (Condo)  Home Layout: One level  Home Access: Level entry  Home Equipment: Rolling walker, Cane     Objective:  Sit to Supine: Moderate assistance (at von LEs pt needed cues to attempt to bring her left UE she wanted me to lift both LEs toghether, once she was in bed she completed 1/2 bridges to middle of bed)    Transfers  Sit to Stand: Moderate Assistance (from bedside chair x 3 trials to assist getting her dressed, pt tended to keep right LE abducted discussed to just keep it in front vs out ot the side )  Stand to sit: Contact guard assistance (cues to reach back and control descend)       Ambulation 1  Device: [address]: Contact guard assistance  Quality of Gait: slow veronica pt had decreased step length at von LEs more so at left vs right due to decreased wbing at right but was starting to pass opp foot, she required cues for posture as she leans heavily on the walker  Distance: 50x1     Exercises:  Exercises  Comments: pt completed ex for increased strength pt completed supine ankle pumps, glut sets, qaud sets, short arc quads, heelsldies with max assist, hip abd/add with max assist x 10 reps each at right LE       Activity Tolerance:  Activity Tolerance: Patient limited by endurance; Patient limited by pain    Assessment:   Body structures, Functions, Activity limitations: Decreased functional mobility , Decreased strength, Decreased endurance, Decreased balance  Assessment: pt tolerated session fair she is limited to the bathroom  Long term goal 3: Pt will amb with RW >= 50 ft, with RW, Mod I for home mobility. Long term goal 4: Pt will perform car t/f with CGA x 1 for discharge home   Long term goal 5: Pt will ambulate on/off elevator with RW, SBA, for access to condo.

## 2018-02-24 NOTE — PROGRESS NOTES
Pt awake at 0730 and voices slept fair. Skin clear dry and intact with right hip incision approx. Noted bruising to right outer thigh and lower calf. Pt .anxious at times and needs encouragement.

## 2018-02-24 NOTE — PLAN OF CARE
Problem: Falls - Risk of  Goal: Absence of falls  Outcome: Met This Shift  Uses call light    Problem: Risk for Impaired Skin Integrity  Goal: Tissue integrity - skin and mucous membranes  Structural intactness and normal physiological function of skin and  mucous membranes. Outcome: Met This Shift  Incision approx. Problem: Nutrition  Goal: Optimal nutrition therapy  Outcome: Met This Shift  adequate    Problem: Bleeding:  Goal: Will show no signs and symptoms of excessive bleeding  Will show no signs and symptoms of excessive bleeding   Outcome: Met This Shift  None noted    Problem: Discharge Planning:  Goal: Knowledge of discharge instructions  Knowledge of discharge instructions   Outcome: Ongoing  Ed in process    Problem: Infection - Surgical Site:  Goal: Signs of wound healing will improve  Signs of wound healing will improve   Outcome: Met This Shift  Incisions approx. Problem: DISCHARGE BARRIERS  Goal: Patient's continuum of care needs are met  Outcome: Met This Shift  Needs met    Problem: Anxiety:  Goal: Level of anxiety will decrease  Level of anxiety will decrease   Outcome: Met This Shift  Anxious at times and needs a lot of reasurance and emotional support    Comments: Care plan reviewed with patient and  verbalize understanding of the plan of care and contribute to goal setting.

## 2018-02-25 ENCOUNTER — APPOINTMENT (OUTPATIENT)
Dept: GENERAL RADIOLOGY | Age: 83
DRG: 536 | End: 2018-02-25
Attending: PHYSICAL MEDICINE & REHABILITATION
Payer: MEDICARE

## 2018-02-25 LAB
AMORPHOUS: ABNORMAL
BACTERIA: ABNORMAL /HPF
BILIRUBIN URINE: NEGATIVE
BLOOD, URINE: NEGATIVE
CASTS 2: ABNORMAL /LPF
CASTS UA: ABNORMAL /LPF
CHARACTER, URINE: ABNORMAL
COLOR: ABNORMAL
CRYSTALS, UA: ABNORMAL
EPITHELIAL CELLS, UA: ABNORMAL /HPF
GLUCOSE URINE: NEGATIVE MG/DL
KETONES, URINE: NEGATIVE
LEUKOCYTE ESTERASE, URINE: NEGATIVE
MISCELLANEOUS 2: ABNORMAL
NITRITE, URINE: NEGATIVE
PH UA: 5.5
PROTEIN UA: ABNORMAL
RBC URINE: ABNORMAL /HPF
RENAL EPITHELIAL, UA: ABNORMAL
SPECIFIC GRAVITY, URINE: 1.02 (ref 1–1.03)
UROBILINOGEN, URINE: 0.2 EU/DL
WBC UA: ABNORMAL /HPF
YEAST: ABNORMAL

## 2018-02-25 PROCEDURE — 81001 URINALYSIS AUTO W/SCOPE: CPT

## 2018-02-25 PROCEDURE — 6370000000 HC RX 637 (ALT 250 FOR IP): Performed by: PHYSICAL MEDICINE & REHABILITATION

## 2018-02-25 PROCEDURE — 99231 SBSQ HOSP IP/OBS SF/LOW 25: CPT | Performed by: FAMILY MEDICINE

## 2018-02-25 PROCEDURE — 6370000000 HC RX 637 (ALT 250 FOR IP): Performed by: FAMILY MEDICINE

## 2018-02-25 PROCEDURE — 6370000000 HC RX 637 (ALT 250 FOR IP): Performed by: ORTHOPAEDIC SURGERY

## 2018-02-25 PROCEDURE — 1180000000 HC REHAB R&B

## 2018-02-25 PROCEDURE — 6360000002 HC RX W HCPCS: Performed by: ORTHOPAEDIC SURGERY

## 2018-02-25 PROCEDURE — 73630 X-RAY EXAM OF FOOT: CPT

## 2018-02-25 RX ADMIN — OXYCODONE HYDROCHLORIDE AND ACETAMINOPHEN 250 MG: 500 TABLET ORAL at 08:12

## 2018-02-25 RX ADMIN — PHENYTOIN SODIUM 100 MG: 100 CAPSULE ORAL at 09:34

## 2018-02-25 RX ADMIN — OXYCODONE HYDROCHLORIDE AND ACETAMINOPHEN 250 MG: 500 TABLET ORAL at 17:03

## 2018-02-25 RX ADMIN — ENOXAPARIN SODIUM 30 MG: 30 INJECTION SUBCUTANEOUS at 19:49

## 2018-02-25 RX ADMIN — ACETAMINOPHEN 650 MG: 325 TABLET ORAL at 19:43

## 2018-02-25 RX ADMIN — PHENYTOIN SODIUM 100 MG: 100 CAPSULE ORAL at 09:35

## 2018-02-25 RX ADMIN — FERROUS SULFATE TAB 325 MG (65 MG ELEMENTAL FE) 325 MG: 325 (65 FE) TAB at 08:12

## 2018-02-25 RX ADMIN — ACETAMINOPHEN 650 MG: 325 TABLET ORAL at 05:31

## 2018-02-25 RX ADMIN — ENOXAPARIN SODIUM 30 MG: 30 INJECTION SUBCUTANEOUS at 08:12

## 2018-02-25 RX ADMIN — FERROUS SULFATE TAB 325 MG (65 MG ELEMENTAL FE) 325 MG: 325 (65 FE) TAB at 17:03

## 2018-02-25 RX ADMIN — VITAMIN D, TAB 1000IU (100/BT) 5000 UNITS: 25 TAB at 08:12

## 2018-02-25 RX ADMIN — PHENYTOIN SODIUM 100 MG: 100 CAPSULE ORAL at 20:39

## 2018-02-25 ASSESSMENT — PAIN DESCRIPTION - LOCATION: LOCATION: KNEE

## 2018-02-25 ASSESSMENT — PAIN SCALES - GENERAL
PAINLEVEL_OUTOF10: 2
PAINLEVEL_OUTOF10: 2
PAINLEVEL_OUTOF10: 0
PAINLEVEL_OUTOF10: 0
PAINLEVEL_OUTOF10: 3
PAINLEVEL_OUTOF10: 0

## 2018-02-25 ASSESSMENT — PAIN DESCRIPTION - DESCRIPTORS: DESCRIPTORS: ACHING

## 2018-02-25 ASSESSMENT — PAIN DESCRIPTION - PAIN TYPE: TYPE: ACUTE PAIN

## 2018-02-25 ASSESSMENT — PAIN DESCRIPTION - ORIENTATION: ORIENTATION: RIGHT

## 2018-02-25 NOTE — PROGRESS NOTES
/64   Pulse 79   Temp 98.3 °F (36.8 °C) (Oral)   Resp 18   Ht 5' 7\" (1.702 m)   Wt 152 lb 6.4 oz (69.1 kg)   SpO2 96%   BMI 23.87 kg/m²   General appearance: alert, appears stated age and cooperative  Lungs: clear to auscultation bilaterally  Heart: regular rate and rhythm, S1, S2 normal, no murmur, click, rub or gallop  Abdomen: soft, non-tender; bowel sounds normal; no masses,  no organomegaly  Extremities: no pedal edema, there is a subungal hematoma to the left great toe nail with some soreness on palpation, there is ecchymosis to the area of the 5th metatarsal with some soreness on palpation  Skin: Skin color, texture, turgor normal. No rashes or lesions  Neurologic: chronic neuropathy to the feet      Electronically signed by Tracey Philippe MD on 2/25/2018 at 12:07 PM

## 2018-02-25 NOTE — PROGRESS NOTES
PT. C/o left great toe pain. No swelling noted and noted gray black bruising across 3/4 of toenail bed. Dr. Kelsea Brown in at [address] and aware of.

## 2018-02-25 NOTE — PLAN OF CARE
Problem: Falls - Risk of  Goal: Absence of falls  Outcome: Met This Shift  Uses call light    Problem: Risk for Impaired Skin Integrity  Goal: Tissue integrity - skin and mucous membranes  Structural intactness and normal physiological function of skin and  mucous membranes. Outcome: Met This Shift  Skin intact with incision approx. ,    Problem: Nutrition  Goal: Optimal nutrition therapy  Outcome: Met This Shift  adequate    Problem: Bleeding:  Goal: Will show no signs and symptoms of excessive bleeding  Will show no signs and symptoms of excessive bleeding   Outcome: Met This Shift  None noted    Problem: Discharge Planning:  Goal: Knowledge of discharge instructions  Knowledge of discharge instructions   Outcome: Met This Shift  Aware of    Problem: Infection - Surgical Site:  Goal: Signs of wound healing will improve  Signs of wound healing will improve   Outcome: Met This Shift  Incision approx. Without reddness swelling    Problem: Anxiety:  Goal: Level of anxiety will decrease  Level of anxiety will decrease   Outcome: Met This Shift  Pt. calm    Comments: Care plan reviewed with patient and    verbalize understanding of the plan of care and contribute to goal setting.

## 2018-02-26 LAB
HCT VFR BLD CALC: 29.3 % (ref 37–47)
HEMOGLOBIN: 9.4 GM/DL (ref 12–16)

## 2018-02-26 PROCEDURE — 85014 HEMATOCRIT: CPT

## 2018-02-26 PROCEDURE — 97110 THERAPEUTIC EXERCISES: CPT

## 2018-02-26 PROCEDURE — 36415 COLL VENOUS BLD VENIPUNCTURE: CPT

## 2018-02-26 PROCEDURE — 97116 GAIT TRAINING THERAPY: CPT

## 2018-02-26 PROCEDURE — 1180000000 HC REHAB R&B

## 2018-02-26 PROCEDURE — 97530 THERAPEUTIC ACTIVITIES: CPT

## 2018-02-26 PROCEDURE — 97535 SELF CARE MNGMENT TRAINING: CPT

## 2018-02-26 PROCEDURE — 6370000000 HC RX 637 (ALT 250 FOR IP): Performed by: ORTHOPAEDIC SURGERY

## 2018-02-26 PROCEDURE — 6370000000 HC RX 637 (ALT 250 FOR IP): Performed by: FAMILY MEDICINE

## 2018-02-26 PROCEDURE — 85018 HEMOGLOBIN: CPT

## 2018-02-26 PROCEDURE — 6360000002 HC RX W HCPCS: Performed by: ORTHOPAEDIC SURGERY

## 2018-02-26 PROCEDURE — 6370000000 HC RX 637 (ALT 250 FOR IP): Performed by: PHYSICAL MEDICINE & REHABILITATION

## 2018-02-26 RX ADMIN — ENOXAPARIN SODIUM 30 MG: 30 INJECTION SUBCUTANEOUS at 19:41

## 2018-02-26 RX ADMIN — PHENYTOIN SODIUM 100 MG: 100 CAPSULE ORAL at 08:49

## 2018-02-26 RX ADMIN — VITAMIN D, TAB 1000IU (100/BT) 5000 UNITS: 25 TAB at 08:50

## 2018-02-26 RX ADMIN — FERROUS SULFATE TAB 325 MG (65 MG ELEMENTAL FE) 325 MG: 325 (65 FE) TAB at 18:24

## 2018-02-26 RX ADMIN — PHENYTOIN SODIUM 100 MG: 100 CAPSULE ORAL at 19:37

## 2018-02-26 RX ADMIN — ACETAMINOPHEN 650 MG: 325 TABLET ORAL at 13:46

## 2018-02-26 RX ADMIN — FERROUS SULFATE TAB 325 MG (65 MG ELEMENTAL FE) 325 MG: 325 (65 FE) TAB at 08:50

## 2018-02-26 RX ADMIN — ENOXAPARIN SODIUM 30 MG: 30 INJECTION SUBCUTANEOUS at 08:51

## 2018-02-26 RX ADMIN — ACETAMINOPHEN 650 MG: 325 TABLET ORAL at 06:17

## 2018-02-26 RX ADMIN — ACETAMINOPHEN 650 MG: 325 TABLET ORAL at 10:35

## 2018-02-26 RX ADMIN — OXYCODONE HYDROCHLORIDE AND ACETAMINOPHEN 250 MG: 500 TABLET ORAL at 08:50

## 2018-02-26 RX ADMIN — OXYCODONE HYDROCHLORIDE AND ACETAMINOPHEN 250 MG: 500 TABLET ORAL at 18:24

## 2018-02-26 ASSESSMENT — PAIN DESCRIPTION - ORIENTATION
ORIENTATION: RIGHT
ORIENTATION: RIGHT

## 2018-02-26 ASSESSMENT — PAIN SCALES - GENERAL
PAINLEVEL_OUTOF10: 3
PAINLEVEL_OUTOF10: 0
PAINLEVEL_OUTOF10: 0
PAINLEVEL_OUTOF10: 4
PAINLEVEL_OUTOF10: 0
PAINLEVEL_OUTOF10: 3
PAINLEVEL_OUTOF10: 0

## 2018-02-26 ASSESSMENT — PAIN DESCRIPTION - LOCATION
LOCATION: HIP;BACK
LOCATION: KNEE;HIP

## 2018-02-26 ASSESSMENT — PAIN DESCRIPTION - PAIN TYPE
TYPE: SURGICAL PAIN
TYPE: SURGICAL PAIN

## 2018-02-26 NOTE — PROGRESS NOTES
[facility]  INPATIENT OCCUPATIONAL THERAPY  [facility]  PROGRESS NOTE    Time:  Time In: 1000  Time Out: 1100  Timed Code Treatment Minutes: 60 Minutes  Minutes: 60          Date: 2018  Patient Name: Samy Hoyt,   Gender: female      MRN: [id number]  : 1934  (80 y.o.)  Referring Practitioner: Ordering and attending prov: Fallon De Luna MD  Diagnosis: Status Post Right Hip Replacement   Additional Pertinent Hx: R JADA on  by Dr Gordy Quintero    Restrictions/Precautions:  Restrictions/Precautions: Weight Bearing, General Precautions, Fall Risk    Right Lower Extremity Weight Bearing: Weight Bearing As Tolerated       Position Activity Restriction  Hip Precautions: No hip flexion > 90 degrees, No ADduction, No hip internal rotation  Other position/activity restrictions: Dr. Idania Quintero THR: no bending >90, no crossing midline, no twisting/pivoring, no IR         Past Medical History:   Diagnosis Date    Drug-induced polyneuropathy ([facility] [address] 2016    Fall due to slipping on ice or snow 2014    History of colon cancer 2004    well diff.  andeno    Hyperplastic colon polyp     Irritable bowel syndrome     Osteoarthritis     Osteoporosis     Seizures ([address] 1969      last seizure    Sprain and strain right ankle     Past Surgical History:   Procedure Laterality Date    CATARACT REMOVAL  2000    right  pajka    CATARACT REMOVAL      left and right    COLECTOMY  2004    low anterior resection    COLONOSCOPY  2011    colon ca 2004    neidich    HYSTERECTOMY  1988    tahbso    [place] TOTAL HIP ARTHROPLASTY Right 2018    RIGHT HIP TOTAL ARTHROPLASTY performed by Meera Mederos MD at [facility]: Pt. seated in recliner upon arrival, agreeable to OT treatment  Comments: Pt. refusing to go to shower stating its too scary and slippery, encouragement provided pt. continues to decline    Pain:  Pain Assessment  Patient Currently in Pain: Denies       Objective  Overall Cognitive Status: Exceptions        Groomin - Requires setup/cues to do all tasks (SBA at sink tot complete oral care)  Bathin - Able to bathe 8-9 areas (assist below B knees)  Dressing-Upper: 5 - Requires setup/supervision/cues and/or requires assist with presthesis/brace only (setup)  Dressing-Lower: 3 - Requires assist with 2-3 parts of dressing (assist with socks. Pt. use reacher to thread RLE)  Toiletin - Requires setup/supervision/cues  Toilet Transfer: 5 - Requires setup/supervision/cues      OT FIM ASSESSMENT:     Admission score Current score Goal Goal Status   EATING 5 - Feeds self with setup/supervision/cues and/or requires only setup/supervision/cues to perform tube feedings 5 - Feeds self with setup/supervision/cues and/or requires only setup/supervision/cues to perform tube feedings 7 Not Met and Continue   GROOMING 5 - Requires setup/cues to do all tasks (SBA to complete oral hygiene and comb hair while sitting in recliner with set up assist. ) 5 - Requires setup/cues to do all tasks (SBA at sink tot complete oral care) 6 Not Met and Continue   BATHING 4 - Able to bathe 8-9 areas 4 - Able to bathe 8-9 areas (assist below B knees) 6 Not Met and Continue   UPPER EXTREMITY DRESSING 5 - Requires setup/supervision/cues and/or requires assist with presthesis/brace only 5 - Requires setup/supervision/cues and/or requires assist with presthesis/brace only (setup) 7 Not Met and Continue   LOWER EXTREMITY DRESSING 1 - Total assist with lower body dressing (Dep to don/doff B socks with No AD while maintaing hip precautions. ) 3 - Requires assist with 2-3 parts of dressing (assist with socks.  Pt. use reacher to thread RLE) 6 Not Met and Continue   TOILETING 2 - Able to perform 1 task only (e.g. hygiene) 5 - Requires setup/supervision/cues 6 Not Met and Continue   TOILET TRANSFER 3 - Requires 25-49% assist getting off toilet 5 - Requires setup/supervision/cues 6 Not Met term goal 3: Pt will complete BUE strengthening exercises to increase strength and endurance needed for ease with ADLs  Short term goal 4: Pt will complete standing ADL wtih SBA and 1-2 hand release for > 4 minutes to increase indep with cooking tasks. Short term goal 5: Pt will complete functional transfers with SBA and no vc for safety including to/from toilet. Long term goals  Time Frame for Long term goals : 2 weeks  Long term goal 1: Pt will complete simple meal prep while standing with SBA and min vc for safety to increase indep within home environment. Long term goal 2: Pt will complete functional mobility household distances with SBA and min vc for safety to increase indep with ADLs/IADLs in home environment.

## 2018-02-26 NOTE — PROGRESS NOTES
[facility] [address]  INPATIENT OCCUPATIONAL THERAPY  [facility]  DAILY NOTE    Time:  Time In: 1400  Time Out: 1430  Timed Code Treatment Minutes: 30 Minutes  Minutes: 30     Date: 2018  Patient Name: Dahlia King,   Gender: female      Room: [facility]/055-A  MRN: [id number]  : 1934  (80 y.o.)  Referring Practitioner: Ordering and attending prov: Luis Ruvalcaba MD  Diagnosis: Status Post Right Hip Replacement   Additional Pertinent Hx: R JADA on  by Dr Gordy Culver    Restrictions/Precautions:  Restrictions/Precautions: Weight Bearing, General Precautions, Fall Risk    Right Lower Extremity Weight Bearing: Weight Bearing As Tolerated       Position Activity Restriction  Hip Precautions: No hip flexion > 90 degrees, No ADduction, No hip internal rotation  Other position/activity restrictions: Dr. Erika Culver THR: no bending >90, no crossing midline, no twisting/pivoring, no IR      Past Medical History:   Diagnosis Date    Drug-induced polyneuropathy ([facility] [address] 2016    Fall due to slipping on ice or snow 2014    History of colon cancer 2004    well diff.  andeno    Hyperplastic colon polyp     Irritable bowel syndrome     Osteoarthritis     Osteoporosis     Seizures ([address] 1969      last seizure    Sprain and strain right ankle     Past Surgical History:   Procedure Laterality Date    CATARACT REMOVAL  2000    right  pajka    CATARACT REMOVAL      left and right    COLECTOMY  2004    low anterior resection    COLONOSCOPY  2011    colon ca 2004    neidich    HYSTERECTOMY  1988    tahbso    [place] TOTAL HIP ARTHROPLASTY Right 2018    RIGHT HIP TOTAL ARTHROPLASTY performed by Shannen Rogel MD at [facility]: Pt. seated in recliner upon arrival, agreeable to OT treatment  Comments: Pt. refusing to go to shower stating its too scary and slippery, encouragement provided pt. continues to decline    Pain:  Pain Assessment  Patient Currently

## 2018-02-26 NOTE — PROGRESS NOTES
[address] [address]  INPATIENT PHYSICAL THERAPY  PROGRESSNOTE  [facility] - 7E-55/055-A    Time In: 0800  Time Out: 0830  Timed Code Treatment Minutes: 30 Minutes  Minutes: 30          Date: 2018  Patient Name: Dahlia King,  Gender:  female        MRN: [id number]  : 1934  (80 y.o.)     Referring Practitioner: Dr. Christa Major   Diagnosis: S/p Total replacement of R hip   Additional Pertinent Hx: Patient is a 80year old female being seen s/p R JADA on 18. Patient has a history of previous falls and on 18 patient fell in bathroom and developed a R intertrochanteric fracture. Patient opted to have a JADA due to severe OA in the hip. Patient is FWB on the RLE. Patient has a history of peripheral neuropathy and anxiety     Past Medical History:   Diagnosis Date    Drug-induced polyneuropathy ([address] 2016    Fall due to slipping on ice or snow 2014    History of colon cancer 2004    well diff.  andeno    Hyperplastic colon polyp     Irritable bowel syndrome     Osteoarthritis     Osteoporosis     Seizures ([address] 1969      last seizure    Sprain and strain right ankle     Past Surgical History:   Procedure Laterality Date    CATARACT REMOVAL      right  pajka    CATARACT REMOVAL      left and right    COLECTOMY  2004    low anterior resection    COLONOSCOPY  2011    colon ca 2004    neidich    HYSTERECTOMY  1988    tahbso    [place] TOTAL HIP ARTHROPLASTY Right 2018    RIGHT HIP TOTAL ARTHROPLASTY performed by Shannen Rogel MD at [facility]       Restrictions/Precautions:  Restrictions/Precautions: Weight Bearing, General Precautions, Fall Risk    Right Lower Extremity Weight Bearing: Weight Bearing As Tolerated       Position Activity Restriction  Hip Precautions: No hip flexion > 90 degrees, No ADduction, No hip internal rotation  Other position/activity restrictions: Dr. Erika Culver THR: no bending >90, no crossing midline, no twisting/pivoring, transfers, bed mobility    Equipment Recommendations:  Equipment Needed: Yes (Pt has RW. Continue to assess need for wheelchair.)    Safety:  Type of devices: All fall risk precautions in place, Call light within reach, Chair alarm in place, Left in chair    Plan:  Times per week: 5x/week 90 min, 1x/week 30 min   Times per day: Daily  Specific instructions for Next Treatment: continue bed mobility, transfer and gait training, BLE strengthening, review precautions  Current Treatment Recommendations: Strengthening, ROM, Balance Training, Functional Mobility Training, Transfer Training, Gait Training, Endurance Training, Home Exercise Program, Safety Education & Training, Patient/Caregiver Education & Training    Goals:  Patient goals : To return home     Short term goals  Time Frame for Short term goals: 1 wk  Short term goal 1: Patient will perform bed mobility Min +1 to decrease risk of pressure ulcers  NOT MET  Short term goal 2: Patient will perform functional transfers Min +1, consistently, to sit in bedside chair MET  Short term goal 3: Patient will ambulate >= 25 ft x1 with  RW and CGA to progress to home mobility. MET  Short term goal 4: Patient will perform car transfer min +1 for transportation needs. NOT MET  Short term goal 5: Pt to walk with RW on/off elevator, min +1 , for access to condo. MET    Long term goals  Time Frame for Long term goals : 3 wks  Long term goal 1: Pt will be Mod I with bed mobility so pt can get in and out of bed to go to the bathroom    Long term goal 2: Pt will perform t/f with Mod I from various surfaces to get up to go to the bathroom  Long term goal 3: Pt will amb with RW >= 50 ft, with RW, Mod I for home mobility. Long term goal 4: Pt will perform car t/f with CGA x 1 for discharge home   Long term goal 5: Pt will ambulate on/off elevator with RW, SBA, for access to condo. Treatment session and note completed by Traci Ludwig PTA.   Assessment and goal revision of

## 2018-02-26 NOTE — PROGRESS NOTES
assess need for wheelchair.)     SPEECH THERAPY  N/A    OCCUPATIONAL THERAPY  FIMS:  Eatin - Feeds self with setup/supervision/cues and/or requires only setup/supervision/cues to perform tube feedings  Groomin - Requires setup/cues to do all tasks (washing face seated in chair)  Bathin - Able to bathe 8-9 areas (assist below B knees)  Dressing-Upper: 5 - Requires setup/supervision/cues and/or requires assist with presthesis/brace only (setup)  Dressing-Lower: 3 - Requires assist with 2-3 parts of dressing (assist with socks. Pt. use reacher to thread RLE)  Toiletin - Requires setup/supervision/cues  Toilet Transfer: 5 - Requires setup/supervision/cues  Shower Transfer: 2 - Maximal assistance, pt. expends 25%-49% effort (min A sit to stand and stand to sit. mod cues and encoruagement to attempt  to sit to stand )    Equipment Needed: Yes  Mobility Devices: ADL Assistive Devices  ADL Assistive Devices: Sock-Aid Hard, Long-handled Sponge, Long-handled Shoe Horn, Reacher    Assessment: Pt. is progressing with OT has met all short term goals this week. She regularly needs mod/max encouragement to participate and frequent education on role of OT. Pt. is completing transfers and ambulation with CGA. [facility] stating her  will assist her, however education has been provided. Pt. is anxious. Pt. would benefit from contniued OT to increase strength and endurance and increase independence with ADLs/IADLs    RECREATIONAL THERAPY  Assessment: Pending    NUTRITION  Weight: 146 lb 12.8 oz (66.6 kg) / Body mass index is 22.99 kg/m². Please see nutrition note for details. NURSING  FIMS:  Bowel: 6 - Modified Independent. Bladder:  7 - Independent.   Wounds/Incisions/Ulcers: Incision healing well  Self-Med Program: Patient able to manage medications and being educated by nursing  Pain: Patient's pain is currently controlled with Tylenol     Consultations/Labs/X-rays: Orthopedic

## 2018-02-26 NOTE — PROGRESS NOTES
[facility]  INPATIENT PHYSICAL THERAPY  DAILYNOTE  [facility] 7E - 7E-55/055-A    Time In: 1200  Time Out: 1230  Timed Code Treatment Minutes: 30 Minutes  Minutes: 30          Date: 2018  Patient Name: Lamont Ventura,  Gender:  female        MRN: [id number]  : 1934  (80 y.o.)     Referring Practitioner: Dr. Ann Marie Flowers   Diagnosis: S/p Total replacement of R hip   Additional Pertinent Hx: Patient is a 80year old female being seen s/p R JADA on 18. Patient has a history of previous falls and on 18 patient fell in bathroom and developed a R intertrochanteric fracture. Patient opted to have a JADA due to severe OA in the hip. Patient is FWB on the RLE. Patient has a history of peripheral neuropathy and anxiety     Past Medical History:   Diagnosis Date    [facility]-induced [facility]uropathy ([facility] [address] 2016    Fall due to slipping on ice or snow 2014    History of colon cancer 2004    well diff.  andeno    Hyperplastic colon polyp     Irritable bowel syndrome     Osteoarthritis     Osteoporosis     Seizures ([address] 1969 last seizure    Sprain and strain right ankle     Past Surgical History:   Procedure Laterality Date    CATARACT REMOVAL      right  pajka    CATARACT REMOVAL      left and right    COLECTOMY      low anterior resection    COLONOSCOPY  2011    colon ca 2004    neidich    HYSTERECTOMY  1988    tahbso    [place] TOTAL HIP ARTHROPLASTY Right 2018    RIGHT HIP TOTAL ARTHROPLASTY performed by Ila Lo MD at [place] [facility]       Restrictions/Precautions:  Restrictions/Precautions: Weight Bearing, General Precautions, Fall Risk    Right Lower Extremity Weight Bearing: Weight Bearing As Tolerated       Position Activity Restriction  Hip Precautions: No hip flexion > 90 degrees, No ADduction, No hip internal rotation  Other position/activity restrictions: Dr. Garcia Hunt THR: no bending >90, no crossing midline, no twisting/pivoring, no falling. Activity Tolerance:  Activity Tolerance: Patient Tolerated treatment well  Activity Tolerance: Good    Assessment: Body structures, Functions, Activity limitations: Decreased functional mobility , Decreased strength, Decreased endurance, Decreased balance  Assessment: Patient refused to transfer into car . Patient declines to repeat activities already done in previous session (ie car, ramp) . Patient improving steadily with transfers and ambulation. Prognosis: Good  Discharge Recommendations: Continue to assess pending progress, Patient would benefit from continued therapy after discharge    Patient Education:  Patient Education: importance of therapy/mobility/therex, transfers, bed mobility    Equipment Recommendations:  Equipment Needed: Yes (Pt has RW. Continue to assess need for wheelchair.)    Safety:  Type of devices: All fall risk precautions in place, Call light within reach, Chair alarm in place, Left in chair    Plan:  Times per week: 5x/week 90 min, 1x/week 30 min   Times per day: Daily  Specific instructions for Next Treatment: continue bed mobility, transfer and gait training, BLE strengthening, review precautions  Current Treatment Recommendations: Strengthening, ROM, Balance Training, Functional Mobility Training, Transfer Training, Gait Training, Endurance Training, Home Exercise Program, Safety Education & Training, Patient/Caregiver Education & Training    Goals:  Patient goals : To return home     Short term goals  Time Frame for Short term goals: 1 wk  Short term goal 1: Patient will perform bed mobility Min +1 to decrease risk of pressure ulcers    Short term goal 2: Patient will perform functional transfers Min +1, consistently, to sit in bedside chair  MET  Short term goal 3: Patient will ambulate >= 25 ft x1 with  RW and CGA to progress to home mobility. MET  Short term goal 4: Patient will perform car transfer min +1 for transportation needs.   Short term goal 5: Pt to walk with RW on/off elevator, min +1 , for access to condo. MET    Long term goals  Time Frame for Long term goals : 3 wks  Long term goal 1: Pt will be Mod I with bed mobility so pt can get in and out of bed to go to the bathroom    Long term goal 2: Pt will perform t/f with Mod I from various surfaces to get up to go to the bathroom  Long term goal 3: Pt will amb with RW >= 50 ft, with RW, Mod I for home mobility. Long term goal 4: Pt will perform car t/f with CGA x 1 for discharge home   Long term goal 5: Pt will ambulate on/off elevator with RW, SBA, for access to condo.

## 2018-02-26 NOTE — PLAN OF CARE
Problem: Falls - Risk of  Goal: Absence of falls  Outcome: Met This Shift  No falls in the last 24 hrs. Pt uses call light to ask for assistance. Fall prevention program in place. Chair and bed alarms are in use. Problem: Risk for Impaired Skin Integrity  Goal: Tissue integrity - skin and mucous membranes  Structural intactness and normal physiological function of skin and  mucous membranes. Outcome: Met This Shift  Skin intact; no open areas. Incision is clean and dry and healing well;covered with special surgical dressing. Problem: Bleeding:  Goal: Will show no signs and symptoms of excessive bleeding  Will show no signs and symptoms of excessive bleeding   Outcome: Met This Shift  No s/s active bleeding this shift. Problem: Discharge Planning:  Goal: Knowledge of discharge instructions  Knowledge of discharge instructions   Outcome: Ongoing  Reviewed hip precautions; pt voiced and verbalized understanding. Asking how long the precautions will have to be followed. Instructed to ask her orthopedic surgeon at her follow up appt for the time frame of the precautions. Problem: Infection - Surgical Site:  Goal: Signs of wound healing will improve  Signs of wound healing will improve   Outcome: Met This Shift  Incision is clean and dry and healing well. Problem: Mobility - Impaired:  Goal: Achieve maximum mobility level  Achieve maximum mobility level   Outcome: Met This Shift  Attends and participates in all scheduled therapies. Amb using a walker; gait belt in use. Problem: Pain Control  Goal: Maintain pain level at or below patient's acceptable level (or 5 if patient is unable to determine acceptable level)  Outcome: Met This Shift  Pt states pain control has been adequate this shift. Problem: Anxiety:  Goal: Level of anxiety will decrease  Level of anxiety will decrease   Outcome: Ongoing  Fewer feelings of anxiety voiced. Comments: Careplan reviewed with pt.  Pt verbalizes understanding of the plan of care & contributes to goal setting.

## 2018-02-26 NOTE — PROGRESS NOTES
of care and discharge planning. Estimated Length of Stay: 3/2/2018  Destination: home health  Appropriate to trial functional independence apartment stay: No   Pass: No  Services at Discharge: [address], Occupational Therapy, Nursing and an Aide 3x week  Equipment at Discharge: Hip kit    Chronic Problems:  1. RIGHT shoulder pain. 1. Pain control as above. 2. Chronic LBBB 2004. 1. Noted on EKG 2/16/2018. 3. Seizures on chronic dilantin.  Last in 1988. 1. Dose dilantin per home regimen. 2. Last Dilantin Free/Total 12.2 on 5/31/2017. 4. Drug induced polyneuropathy. 1. No current medications. 5. Tachycardia. 1. EKG on 2/16/2018 showed sinus tachycardia at 101 with known chronic LBBB. 2. Likely due to anxiety and noted anemia. 3. Last 24 hours 90-91 on 2/21. Resolved.     Labs:  1. 2/17.  2. 2/18.     Infectious Disease:  1. UTI with Proteus mirabilis and E. Coli. Resolved.     Missed Therapy Time:  None     DME:      Nydia Linda MD

## 2018-02-27 PROCEDURE — 6370000000 HC RX 637 (ALT 250 FOR IP): Performed by: PHYSICAL MEDICINE & REHABILITATION

## 2018-02-27 PROCEDURE — 97530 THERAPEUTIC ACTIVITIES: CPT

## 2018-02-27 PROCEDURE — 6370000000 HC RX 637 (ALT 250 FOR IP): Performed by: FAMILY MEDICINE

## 2018-02-27 PROCEDURE — 1180000000 HC REHAB R&B

## 2018-02-27 PROCEDURE — 6360000002 HC RX W HCPCS: Performed by: ORTHOPAEDIC SURGERY

## 2018-02-27 PROCEDURE — 97535 SELF CARE MNGMENT TRAINING: CPT

## 2018-02-27 PROCEDURE — 6370000000 HC RX 637 (ALT 250 FOR IP): Performed by: ORTHOPAEDIC SURGERY

## 2018-02-27 PROCEDURE — 97116 GAIT TRAINING THERAPY: CPT

## 2018-02-27 PROCEDURE — 97110 THERAPEUTIC EXERCISES: CPT

## 2018-02-27 PROCEDURE — 99231 SBSQ HOSP IP/OBS SF/LOW 25: CPT | Performed by: FAMILY MEDICINE

## 2018-02-27 RX ORDER — LIDOCAINE 50 MG/G
2 PATCH TOPICAL DAILY
Status: DISCONTINUED | OUTPATIENT
Start: 2018-02-28 | End: 2018-03-01

## 2018-02-27 RX ADMIN — ENOXAPARIN SODIUM 30 MG: 30 INJECTION SUBCUTANEOUS at 21:46

## 2018-02-27 RX ADMIN — FERROUS SULFATE TAB 325 MG (65 MG ELEMENTAL FE) 325 MG: 325 (65 FE) TAB at 21:46

## 2018-02-27 RX ADMIN — FOSFOMYCIN TROMETHAMINE 1 PACKET: 3 POWDER ORAL at 14:21

## 2018-02-27 RX ADMIN — ACETAMINOPHEN 650 MG: 325 TABLET ORAL at 21:46

## 2018-02-27 RX ADMIN — VITAMIN D, TAB 1000IU (100/BT) 5000 UNITS: 25 TAB at 08:50

## 2018-02-27 RX ADMIN — FERROUS SULFATE TAB 325 MG (65 MG ELEMENTAL FE) 325 MG: 325 (65 FE) TAB at 08:50

## 2018-02-27 RX ADMIN — ACETAMINOPHEN 650 MG: 325 TABLET ORAL at 14:21

## 2018-02-27 RX ADMIN — PHENYTOIN SODIUM 100 MG: 100 CAPSULE ORAL at 08:50

## 2018-02-27 RX ADMIN — DOCUSATE SODIUM 100 MG: 100 CAPSULE ORAL at 21:46

## 2018-02-27 RX ADMIN — PHENYTOIN SODIUM 100 MG: 100 CAPSULE ORAL at 21:46

## 2018-02-27 RX ADMIN — PHENYTOIN SODIUM 100 MG: 100 CAPSULE ORAL at 08:55

## 2018-02-27 RX ADMIN — OXYCODONE HYDROCHLORIDE AND ACETAMINOPHEN 250 MG: 500 TABLET ORAL at 18:26

## 2018-02-27 RX ADMIN — ENOXAPARIN SODIUM 30 MG: 30 INJECTION SUBCUTANEOUS at 08:50

## 2018-02-27 RX ADMIN — ACETAMINOPHEN 650 MG: 325 TABLET ORAL at 18:24

## 2018-02-27 RX ADMIN — OXYCODONE HYDROCHLORIDE AND ACETAMINOPHEN 250 MG: 500 TABLET ORAL at 08:50

## 2018-02-27 RX ADMIN — DOCUSATE SODIUM 100 MG: 100 CAPSULE ORAL at 08:50

## 2018-02-27 ASSESSMENT — PAIN SCALES - GENERAL
PAINLEVEL_OUTOF10: 3
PAINLEVEL_OUTOF10: 5
PAINLEVEL_OUTOF10: 0
PAINLEVEL_OUTOF10: 0

## 2018-02-27 ASSESSMENT — PAIN DESCRIPTION - LOCATION
LOCATION: HIP;KNEE
LOCATION: HIP

## 2018-02-27 ASSESSMENT — PAIN DESCRIPTION - ORIENTATION
ORIENTATION: RIGHT
ORIENTATION: RIGHT

## 2018-02-27 ASSESSMENT — PAIN DESCRIPTION - PAIN TYPE: TYPE: SURGICAL PAIN

## 2018-02-27 NOTE — PROGRESS NOTES
Devices in place: Yes  Type of devices: All fall risk precautions in place, Call light within reach, Patient at risk for falls, Gait belt, Chair alarm in place, Left in chair    Plan:  Times per week: 5x/week x 90 minutes and 1x/week x 30 minutes  Current Treatment Recommendations: Strengthening, Balance Training, Functional Mobility Training, Endurance Training, Patient/Caregiver Education & Training, Self-Care / ADL, Safety Education & Training, Home Management Training, Equipment Evaluation, Education, & procurement    Goals:  Patient goals : \" I want to go home and be with my \"     Short term goals  Time Frame for Short term goals: 1 week  Short term goal 1: Pt will complete functional mobility to/from bathroom with SBA and RW with no vc for safety to increase indep with HH ambulation  Short term goal 2: Pt will complete BADL routine with min A and LHAE with no vc for hip precautions to increase indep with ADL routine  Short term goal 3: Pt will complete BUE strengthening exercises to increase strength and endurance needed for ease with ADLs  Short term goal 4: Pt will complete standing ADL wtih SBA and 1-2 hand release for > 4 minutes to increase indep with cooking tasks. Short term goal 5: Pt will complete functional transfers with SBA and no vc for safety including to/from toilet. Long term goals  Time Frame for Long term goals : 2 weeks  Long term goal 1: Pt will complete simple meal prep while standing with SBA and min vc for safety to increase indep within home environment. Long term goal 2: Pt will complete functional mobility household distances with SBA and min vc for safety to increase indep with ADLs/IADLs in home environment.

## 2018-02-27 NOTE — PROGRESS NOTES
Requires up to Maximal Assistance AND requires assistance of one person to walk/operate wheelchair between  feet, PT Equipment Recommendations  Equipment Needed: Yes (Pt has RW. Continue to assess need for wheelchair.), Assessment: Patient steadily improving with ambulation distances. Unable to swing right LE out of right side of bed, but supine to sit without assistance from left side of bed. Occupational therapy: FIMS:  Eatin - Feeds self with setup/supervision/cues and/or requires only setup/supervision/cues to perform tube feedings  Groomin - Requires setup/cues to do all tasks (washed and dried face with setup)  Bathin - Able to bathe all 10 areas with setup/sup/cues (COmpleted UB bathing only seated in recliner)  Dressing-Upper: 4 - Requires assist with buttons/zippers only and/or requires assist with one arm only (assist to pullshirt up in back to doff)  Dressing-Lower: 5 - Requires setup/supervision/cues and/or staff applies TEDS/prosthesis/brace only (SBA)  Toiletin - Requires setup/supervision/cues  Toilet Transfer: 5 - Requires setup/supervision/cues (SBA to and from RTS with arm rests)  Shower Transfer: 2 - Maximal assistance, pt. expends 25%-49% effort (min A sit to stand and stand to sit. mod cues and encoruagement to attempt  to sit to stand ),  , Assessment: Pt. is progressing with OT has met all short term goals this week. She regularly needs mod/max encouragement to participate and frequent education on role of OT. Pt. is completing transfers and ambulation with CGA. Pt. is resisitant to using [employer] Manz[employer]res, stating her  will assist her, however education has been provided. Pt. is anxious.  Pt. would benefit from contniued OT to increase strength and endurance and increase independence with ADLs/IADLs    Speech therapy: FIMS: Comprehension: 6 - Complex ideas 90% or device (hearing aid/glasses)  Expression: 6 - Device used to express complex ideas/needs  Social Interaction: 6 - Patient requires medication for mood and/or effect  Problem Solvin - Patient solves simple/routine tasks 75-90%+   Memory: 4 - Patient remembers 75-90%+ of the time    Review of Systems:  CONSTITUTIONAL:  positive for fatigue  EYES:  negative  HEENT:  negative  RESPIRATORY:  negative  CARDIOVASCULAR:  negative  GASTROINTESTINAL:  negative  GENITOURINARY:  negative  SKIN:  negative  HEMATOLOGIC/LYMPHATIC:  negative  MUSCULOSKELETAL:  positive for  myalgias, arthralgias, pain, joint swelling, stiff joints, decreased range of motion, muscle weakness and bone pain  NEUROLOGICAL:  positive for weakness and pain  BEHAVIOR/PSYCH:  positive for anxiety  10 point system review otherwise negative    Objective:  BP (!) 142/62   Pulse 93   Temp 98.2 °F (36.8 °C) (Oral)   Resp 14   Ht 5' 7\" (1.702 m)   Wt 146 lb 12.8 oz (66.6 kg)   SpO2 97%   BMI 22.99 kg/m²     awake  Orientation:   person, place, time, situation  Mood: within normal limits  Affect: anxious  General appearance: in no acute distress, up in chair     Memory:   grossly normal  Attention/Concentration: normal  Language:  normal     ROM:  abnormal - RIGHT JADA  Motor Exam:  Motor exam is 4 out of 5 all extremities with the exception of RIGHT HF and KE not tested     Tone:  Normal RIGHT leg not tested  Coordination:   Normal, RIGHT leg not tested  Deep Tendon Reflexes:  Reflexes are intact and symmetrical bilaterally, RIGHT leg not tested     Skin: warm and dry, no rash or erythema  Peripheral vascular: Pulses: Normal upper and lower extremity pulses; Edema: 1+    Diagnostics:   No results found for this or any previous visit (from the past 24 hour(s)). Impression:  1. S/p RIGHT total hip arthroplasty by Dr. Mohsen Simental on 2018.  2. Fall closed displaced intertrochanteric femur fracture. 3. End-stage osteoarthritis, RIGHT hip. 4. Falls. 5. Gait instability. 6. Post operative anemia. Hgb 13.4 on  and 8.8 0n 2/15/2018.   7. RIGHT DVT: Lovenox. GI: None. 10.  and case management consultations for coordination of care and discharge planning. Estimated Length of Stay: 3/2/2018  Destination: home health  Appropriate to trial functional independence apartment stay: No   Pass: No  Services at Discharge: [address], Occupational Therapy, Nursing and an Aide 3x week  Equipment at Discharge: Hip kit    Chronic Problems:  1. RIGHT shoulder pain. 1. Pain control as above. 2. Chronic LBBB 2004. 1. Noted on EKG 2/16/2018. 3. Seizures on chronic dilantin.  Last in 1988. 1. Dose dilantin per home regimen. 2. Last Dilantin Free/Total 12.2 on 5/31/2017. 4. Drug induced polyneuropathy. 1. No current medications. 5. Tachycardia. 1. EKG on 2/16/2018 showed sinus tachycardia at 101 with known chronic LBBB. 2. Likely due to anxiety and noted anemia. 3. Last 24 hours 90-91 on 2/21. Resolved.     Labs:  1. 2/17.  2. 2/18.     Infectious Disease:  1. UTI with Proteus mirabilis and E. Coli. Resolved.     Missed Therapy Time:  None     DME:      Luis Ruvalcaba MD

## 2018-02-28 PROCEDURE — 97535 SELF CARE MNGMENT TRAINING: CPT

## 2018-02-28 PROCEDURE — 97110 THERAPEUTIC EXERCISES: CPT

## 2018-02-28 PROCEDURE — 1180000000 HC REHAB R&B

## 2018-02-28 PROCEDURE — 6360000002 HC RX W HCPCS: Performed by: ORTHOPAEDIC SURGERY

## 2018-02-28 PROCEDURE — 6370000000 HC RX 637 (ALT 250 FOR IP): Performed by: PHYSICAL MEDICINE & REHABILITATION

## 2018-02-28 PROCEDURE — 97530 THERAPEUTIC ACTIVITIES: CPT

## 2018-02-28 PROCEDURE — 6370000000 HC RX 637 (ALT 250 FOR IP): Performed by: ORTHOPAEDIC SURGERY

## 2018-02-28 PROCEDURE — 97116 GAIT TRAINING THERAPY: CPT

## 2018-02-28 PROCEDURE — 6370000000 HC RX 637 (ALT 250 FOR IP): Performed by: FAMILY MEDICINE

## 2018-02-28 RX ADMIN — OXYCODONE HYDROCHLORIDE AND ACETAMINOPHEN 250 MG: 500 TABLET ORAL at 18:31

## 2018-02-28 RX ADMIN — ACETAMINOPHEN 650 MG: 325 TABLET ORAL at 06:06

## 2018-02-28 RX ADMIN — ACETAMINOPHEN 650 MG: 325 TABLET ORAL at 13:01

## 2018-02-28 RX ADMIN — DOCUSATE SODIUM 100 MG: 100 CAPSULE ORAL at 21:19

## 2018-02-28 RX ADMIN — FERROUS SULFATE TAB 325 MG (65 MG ELEMENTAL FE) 325 MG: 325 (65 FE) TAB at 08:43

## 2018-02-28 RX ADMIN — FERROUS SULFATE TAB 325 MG (65 MG ELEMENTAL FE) 325 MG: 325 (65 FE) TAB at 18:31

## 2018-02-28 RX ADMIN — PHENYTOIN SODIUM 100 MG: 100 CAPSULE ORAL at 08:44

## 2018-02-28 RX ADMIN — PHENYTOIN SODIUM 100 MG: 100 CAPSULE ORAL at 21:19

## 2018-02-28 RX ADMIN — VITAMIN D, TAB 1000IU (100/BT) 5000 UNITS: 25 TAB at 08:44

## 2018-02-28 RX ADMIN — OXYCODONE HYDROCHLORIDE AND ACETAMINOPHEN 250 MG: 500 TABLET ORAL at 08:44

## 2018-02-28 RX ADMIN — ACETAMINOPHEN 650 MG: 325 TABLET ORAL at 18:31

## 2018-02-28 RX ADMIN — ENOXAPARIN SODIUM 30 MG: 30 INJECTION SUBCUTANEOUS at 08:46

## 2018-02-28 RX ADMIN — ENOXAPARIN SODIUM 30 MG: 30 INJECTION SUBCUTANEOUS at 21:19

## 2018-02-28 ASSESSMENT — PAIN DESCRIPTION - LOCATION: LOCATION: KNEE;HIP

## 2018-02-28 ASSESSMENT — PAIN SCALES - GENERAL
PAINLEVEL_OUTOF10: 0
PAINLEVEL_OUTOF10: 2
PAINLEVEL_OUTOF10: 0
PAINLEVEL_OUTOF10: 4
PAINLEVEL_OUTOF10: 0

## 2018-02-28 ASSESSMENT — PAIN DESCRIPTION - PAIN TYPE: TYPE: SURGICAL PAIN

## 2018-02-28 ASSESSMENT — PAIN DESCRIPTION - ORIENTATION: ORIENTATION: RIGHT

## 2018-02-28 NOTE — PLAN OF CARE
Problem: Falls - Risk of  Goal: Absence of falls  Outcome: Met This Shift  No falls in the last 24 hrs. Pt uses call light to ask for assistance. Fall prevention program in place. Chair and bed alarms are in use. Problem: Bleeding:  Goal: Will show no signs and symptoms of excessive bleeding  Will show no signs and symptoms of excessive bleeding   Outcome: Met This Shift  No s/s active bleeding this shift. Problem: Discharge Planning:  Goal: Knowledge of discharge instructions  Knowledge of discharge instructions   Outcome: Ongoing  Discharge planning continues. Problem: Infection - Surgical Site:  Goal: Signs of wound healing will improve  Signs of wound healing will improve   Outcome: Met This Shift  Skin intact; no open areas. Incision is clean and dry and healing well. Problem: Mobility - Impaired:  Goal: Achieve maximum mobility level  Achieve maximum mobility level   Outcome: Met This Shift  Attends and participates in all scheduled therapies. Problem: Pain Control  Goal: Maintain pain level at or below patient's acceptable level (or 5 if patient is unable to determine acceptable level)  Outcome: Met This Shift  Pt states pain control has been adequate this shift. Problem: Anxiety:  Goal: Level of anxiety will decrease  Level of anxiety will decrease   Outcome: Ongoing  States she feels anxious today. Worried about her  and his plans to come visit her today. Comments: Careplan reviewed with pt. Pt verbalizes understanding of the plan of care & contributes to goal setting.

## 2018-02-28 NOTE — PROGRESS NOTES
wheel chair)  Stand to sit: Contact guard assistance (chair, wheel chair)  Transfer Comments: verbal cues for safety with hand and foot  placement       Balance  Sitting Balance: Modified independent  (sitting in chair)    Standing Balance: Contact guard assistance   Time: in preparation for ambulation     Functional Mobility  Functional - Mobility Device: Rolling Walker  Activity: to/from wheel chair  Assist Level: Contact guard assistance  Functional Mobility Comments: to/from wheel chair 4 feet. Pt asked in the apartment to stand for IADL tasks. Pt initiated scooting to edge of chair and then became frustrated stating that she was just too weak and just too tired to stand. Pt reported that she just could not stand. Pt given active listening and was given verbal cues for proper breathing technique. Pt given a seated rest break for relaxation in preparation for functional activity. Additional Activities: Pt seated at table in apartment with stacking items  with  AROM needed for improved IADL's       Activity Tolerance:  Activity Tolerance: Patient limited by fatigue;Patient limited by pain    Assessment:     Performance deficits / Impairments: Decreased functional mobility , Decreased ADL status, Decreased endurance, Decreased balance, Decreased strength, Decreased safe awareness, Decreased high-level IADLs  Prognosis: Good  Discharge Recommendations: Continue to assess pending progress    Patient Education:  Patient Education: standing tolerance, relaxation  Barriers to Learning: Anxiety     Equipment Recommendations:  Equipment Needed: Yes  Mobility Devices: ADL Assistive Devices  ADL Assistive Devices: Sock-Aid Hard, Long-handled Sponge, Long-handled Shoe Horn, Reacher    Safety:  Safety Devices in place: Yes  Type of devices:  All fall risk precautions in place, Call light within reach, Chair alarm in place, Gait belt    Plan:  Times per week: 5x/week x 90 minutes and 1x/week x 30 minutes  Current Treatment Recommendations: Strengthening, Balance Training, Functional Mobility Training, Endurance Training, Patient/Caregiver Education & Training, Self-Care / ADL, Safety Education & Training, Home Management Training, Equipment Evaluation, Education, & procurement    Goals:  Patient goals : \" I want to go home and be with my \"     Short term goals  Time Frame for Short term goals: 1 week  Short term goal 1: Pt will complete functional mobility to/from bathroom with SBA and RW with no vc for safety to increase indep with HH ambulation  Short term goal 2: Pt will complete BADL routine with min A and LHAE with no vc for hip precautions to increase indep with ADL routine  Short term goal 3: Pt will complete BUE strengthening exercises to increase strength and endurance needed for ease with ADLs  Short term goal 4: Pt will complete standing ADL wtih SBA and 1-2 hand release for > 4 minutes to increase indep with cooking tasks. Short term goal 5: Pt will complete functional transfers with SBA and no vc for safety including to/from toilet. Long term goals  Time Frame for Long term goals : 2 weeks  Long term goal 1: Pt will complete simple meal prep while standing with SBA and min vc for safety to increase indep within home environment. Long term goal 2: Pt will complete functional mobility household distances with SBA and min vc for safety to increase indep with ADLs/IADLs in home environment.

## 2018-02-28 NOTE — PROGRESS NOTES
PT Equipment Recommendations  Equipment Needed: Yes (Pt has RW. Continue to assess need for wheelchair.), Assessment: pt limited by anxiety and fatigue this session but did particpate to her tolerance entire session     Occupational therapy: FIMS:  Eatin - Feeds self with setup/supervision/cues and/or requires only setup/supervision/cues to perform tube feedings  Groomin - Requires setup/cues to do all tasks (Pt standinga t sink 3 minutes 50 seconds with oral care and washign hands. Pt seated in chair with hair care)  Bathin - Able to bathe all 10 areas with setup/sup/cues (COmpleted UB bathing only seated in recliner)  Dressing-Upper: 4 - Requires assist with buttons/zippers only and/or requires assist with one arm only (assist to pullshirt up in back to doff)  Dressing-Lower: 5 - Requires setup/supervision/cues and/or staff applies TEDS/prosthesis/brace only (SBA)  Toiletin - Requires steadying assistance only (steading A with adjusting clothing in standing)  Toilet Transfer: 4 - Requires steadying assistance only < 25% assist (steadying A using ETS over toilet)  Shower Transfer: 2 - Maximal assistance, pt. expends 25%-49% effort (min A sit to stand and stand to sit. mod cues and encoruagement to attempt  to sit to stand ),  , Assessment: Pt. is progressing with OT has met all short term goals this week. She regularly needs mod/max encouragement to participate and frequent education on role of OT. Pt. is completing transfers and ambulation with CGA. Pt. is resisitant to using Evelene Piercy, stating her  will assist her, however education has been provided. Pt. is anxious.  Pt. would benefit from contniued OT to increase strength and endurance and increase independence with ADLs/IADLs    Speech therapy: FIMS: Comprehension: 6 - Complex ideas 90% or device (hearing aid/glasses)  Expression: 6 - Device used to express complex ideas/needs  Social Interaction: 6 - Patient requires medication for mood

## 2018-02-28 NOTE — PROGRESS NOTES
for wheelchair.)    Safety:  Type of devices: All fall risk precautions in place, Call light within reach, Chair alarm in place, Left in chair    Plan:  Times per week: 5x/week 90 min, 1x/week 30 min   Times per day: Daily  Specific instructions for Next Treatment: continue bed mobility, transfer and gait training, BLE strengthening, review precautions  Current Treatment Recommendations: Strengthening, ROM, Balance Training, Functional Mobility Training, Transfer Training, Gait Training, Endurance Training, Home Exercise Program, Safety Education & Training, Patient/Caregiver Education & Training    Goals:  Patient goals : To return home     Short term goals  Time Frame for Short term goals: 1 wk  Short term goal 1: Patient will perform bed mobility Min +1 to decrease risk of pressure ulcers  MET  Short term goal 2: Patient will perform functional transfers Min +1, consistently, to sit in bedside chair  MET  Short term goal 3: Patient will ambulate >= 25 ft x1 with  RW and CGA to progress to home mobility. MET  Short term goal 4: Patient will perform car transfer min +1 for transportation needs. Short term goal 5: Pt to walk with RW on/off elevator, min +1 , for access to condo. MET    Long term goals  Time Frame for Long term goals : 3 wks  NO LTG's MET  Long term goal 1: Pt will be Mod I with bed mobility so pt can get in and out of bed to go to the bathroom    Long term goal 2: Pt will perform t/f with Mod I from various surfaces to get up to go to the bathroom  Long term goal 3: Pt will amb with RW >= 50 ft, with RW, Mod I for home mobility. Long term goal 4: Pt will perform car t/f with CGA x 1 for discharge home   Long term goal 5: Pt will ambulate on/off elevator with RW, SBA, for access to condo.

## 2018-02-28 NOTE — PROGRESS NOTES
Nutrition Assessment    Type and Reason for Visit: Reassess, Initial    Malnutrition Assessment:  · Malnutrition Status: No malnutrition    Nutrition Diagnosis:   · Problem: No nutrition diagnosis at this time    Nutrition Assessment:  · Subjective Assessment: Pt s/p right total hip arthroplasty on 2/14. Pt seen- she reports good appetite and intake of meals consuming 75% or more of most meals. Pt states she is getting tired of the Ensure Clear and has been trying to consume x2 of them daily. Pt is amnenable to try Magic Cups BID (Orange). Pt denies N/VD/C or chewing/swallowing difficulty with meals daily. Last BM x1 on 2/25. · Wound Type: Surgical Wound (s/p R. total hip arthroplasty on 2/14)  · Current Nutrition Therapies:  · Oral Diet Orders: General   · Oral Diet intake: %  · Oral Nutrition Supplement (ONS) Orders: Frozen Oral Supplement (Magic Cup BID; Orange)  · ONS intake:  (pt is drinking x2 Ensure Clear daily; but is bored of it.  Initiating Magic Cup BID today)  · Anthropometric Measures:  · Ht: 5' 7\" (170.2 cm)   · Current Body Wt: 146 lb 8 oz (66.5 kg) (2/27; +1 non-pitting edema BLE)  · Admission Body Wt: 147 lb 8 oz (66.9 kg) (2/12; +2 non-pitting edmea BLE)  · Usual Body Wt: 145 lb (65.8 kg) (per pt report; 147 lb 8 oz on 2/12/18)  · % Weight Change: no weight loss ,  x11 months per chart review  · Ideal Body Wt: 135 lb (61.2 kg), % Ideal Body 108%  · BMI Classification: BMI 18.5 - 24.9 Normal Weight (23)  · Comparative Standards (Estimated Nutrition Needs):  · Estimated Daily Total Kcal: 5907-1370 kcal/day  · Estimated Daily Protein (g): 66-80 g/day    Estimated Intake vs Estimated Needs: Intake Meets Needs    Nutrition Risk Level   Risk Level: Low    Nutrition Intervention  Food and/or Delivery: Continue current diet, Modify current ONS  Nutrition Education/Counseling/Coordination of Care:  Continued Inpatient Monitoring, Education Initiated (Encouraged adequate po intake of meals and

## 2018-02-28 NOTE — PROGRESS NOTES
assistance (with R LE )  Scooting: Stand by assistance    Transfers  Sit to stand: Contact guard assistance (bed, chair)  Stand to sit: Contact guard assistance (chair)       Balance  Sitting Balance: Modified independent  (seated in chair)   Standing balance: CGA at sink and with IADL activity in room. Functional Mobility  Functional - Mobility Device: Rolling Walker  Activity: To/from bathroom  Assist Level: Contact guard assistance  Functional Mobility Comments: Pt ambulated to/from bathroom and around room with IADL's, Slow pace, NO LOB     Additional Activities: Pt ambulated around room and organized and cleaned tray table neede for improved IADL's. Pt utilizned reaching and picking up items off floor for improved IADL's with adherance to hip precuations. Pt completed reacher activity with short with donning shorts over B feet for improved utilization of the reacher for  improved independence with ADL's. Pt required demonstration and verbal cues for the reacher. The therapist handed the pt the reacher and the pt was gripping the reacher from the claw end. Pt given skilled instructions on use and benefit of the reacher for improved functional activity. Pt declined to get into clothes at this time. Pt wearing hospital clothing. Pt reported that her  was suppose to bring some specific clothes in for her. Groomin - Requires setup/cues to do all tasks (Pt standinga t sink 3 minutes 50 seconds with oral care and washign hands.  Pt seated in chair with hair care)                                                                                                                                                                                                                                                                                                                         Toiletin - Requires steadying

## 2018-02-28 NOTE — PROGRESS NOTES
[facility]  INPATIENT PHYSICAL THERAPY  DAILYNOTE  [facility] INPATIENT REHAB 7E - 7E-[id number]    Time In: 0930  Time Out: 1000  Timed Code Treatment Minutes: 30 Minutes  Minutes: 30          Date: 2018  Patient Name: Neville Banegas,  Gender:  female        MRN: [id number]  : 1934  (80 y.o.)     Referring Practitioner: Dr. Martinez Pascal   Diagnosis: S/p Total replacement of R hip   Additional Pertinent Hx: Patient is a 80year old female being seen s/p R JADA on 18. Patient has a history of previous falls and on 18 patient fell in bathroom and developed a R intertrochanteric fracture. Patient opted to have a JADA due to severe OA in the hip. Patient is FWB on the RLE. Patient has a history of peripheral neuropathy and anxiety     Past Medical History:   Diagnosis Date    Drug-induced polyneuropathy ([facility] [place] [address] 2016    Fall due to slipping on ice or snow 2014    History of colon cancer 2004    well diff.  andeno    Hyperplastic colon polyp     Irritable bowel syndrome     Osteoarthritis     Osteoporosis     Seizures ([address] 1969      last seizure    Sprain and strain right ankle     Past Surgical History:   Procedure Laterality Date    CATARACT REMOVAL      right  pajka    CATARACT REMOVAL      left and right    COLECTOMY  2004    low anterior resection    COLONOSCOPY  2011    colon ca 2004    neidich    HYSTERECTOMY  1988    tahbso    MN TOTAL HIP ARTHROPLASTY Right 2018    RIGHT HIP TOTAL ARTHROPLASTY performed by Amber Calix MD at [facility]       Restrictions/Precautions:  Restrictions/Precautions: Weight Bearing, General Precautions, Fall Risk    Right Lower Extremity Weight Bearing: Weight Bearing As Tolerated       Position Activity Restriction  Hip Precautions: No hip flexion > 90 degrees, No ADduction, No hip internal rotation  Other position/activity restrictions: Dr. Angelica Aquino THR: no bending >90, no crossing midline, no twisting/pivoring, no

## 2018-02-28 NOTE — PROGRESS NOTES
Alex Hurt 60  INPATIENT OCCUPATIONAL THERAPY  [facility]  DAILY NOTE    Time:  Time In: 1330  Time Out: 1401  Timed Code Treatment Minutes: 31 Minutes  Minutes: 31     Variance: 31    Date: 2018  Patient Name: Kin Davies,   Gender: female      Room: [address] [place], [place] [address]-  MRN: [id number]  : 1934  (80 y.o.)  Referring Practitioner: Ordering and attending prov: Melo Davis MD  Diagnosis: Status Post Right Hip Replacement   Additional Pertinent Hx: R JADA on  by Dr Gordy Vera    Restrictions/Precautions:  Restrictions/Precautions: Weight Bearing, General Precautions, Fall Risk    Right Lower Extremity Weight Bearing: Weight Bearing As Tolerated       Position Activity Restriction  Hip Precautions: No hip flexion > 90 degrees, No ADduction, No hip internal rotation  Other position/activity restrictions: Dr. Regi Vera THR: no bending >90, no crossing midline, no twisting/pivoring, no IR         Past Medical History:   Diagnosis Date    Drug-induced polyneuropathy ([address] 2016    Fall due to slipping on ice or snow 2014    History of colon cancer 2004    well diff.  andeno    Hyperplastic colon polyp     Irritable bowel syndrome     Osteoarthritis     Osteoporosis     Seizures ([address] 1969      last seizure    Sprain and strain right ankle     Past Surgical History:   Procedure Laterality Date    CATARACT REMOVAL  2000    right  pajka    CATARACT REMOVAL      left and right    COLECTOMY  2004    low anterior resection    COLONOSCOPY  2011    colon ca 2004    neidich    HYSTERECTOMY  1988    tahbso    TX TOTAL HIP ARTHROPLASTY Right 2018    RIGHT HIP TOTAL ARTHROPLASTY performed by Renee Busby MD at [facility]: required encouragement, c/o fatigue, extra time for completing tasks      Pain:  Pain Assessment  Patient Currently in Pain: Yes (0/10 at rest , 4/10 with activity)       Objective  Overall Cognitive Status: Exceptions (anxiety noted, redirection required)          Transfers  Sit to stand: Contact guard assistance (from recliner)  Stand to sit: Contact guard assistance (to recliner)       Balance  Standing Balance: Contact guard assistance           Functional Mobility  Functional - Mobility Device: Rolling Walker  Activity: To/from bathroom  Assist Level: Contact guard assistance  Functional Mobility Comments: 1 vcs for THR precautions/education for positioning of walker                    Comment: Completed BUE yellow theraband exercises x 10 reps while seated in recliner for chest press, elbow flexion, elbow extension; L shoulder flexion (R shoulder flexion not completed d/t pain/fatigue in shoulder per Pt).                                                                                                                                                                                                                                                                                                                                                  Toiletin - Requires steadying assistance only (steading A with adjusting clothing in standing)                                                                               Toilet Transfer: 4 - Requires steadying assistance only < 25% assist (steadying A using ETS over toilet)                                                                                                                                                                                                                                                                                                                                                                                                                                                                                                                   Activity Tolerance:  Activity Tolerance: Patient limited by fatigue    Assessment:     Performance deficits / Impairments: Decreased functional mobility , Decreased ADL status, Decreased endurance, Decreased balance, Decreased strength, Decreased safe awareness, Decreased high-level IADLs  Prognosis: Good  Discharge Recommendations: Continue to assess pending progress    Patient Education:  Patient Education: Building endurance  Barriers to Learning: Anxiety     Equipment Recommendations:  Equipment Needed: Yes  Mobility Devices: ADL Assistive Devices  ADL Assistive Devices: Sock-Aid Hard, Long-handled Sponge, Long-handled [employer], Reacher    Safety:  Safety Devices in place: Yes  Type of devices: All fall risk precautions in place, Call light within reach, Chair alarm in place, Gait belt    Plan:  Times per week: 5x/week x 90 minutes and 1x/week x 30 minutes  Current Treatment Recommendations: Strengthening, Balance Training, Functional Mobility Training, Endurance Training, Patient/Caregiver Education & Training, Self-Care / ADL, Safety Education & Training, Home Management Training, Equipment Evaluation, Education, & procurement    Goals:  Patient goals : \" I want to go home and be with my \"     Short term goals  Time Frame for Short term goals: 1 week  Short term goal 1: Pt will complete functional mobility to/from bathroom with SBA and RW with no vc for safety to increase indep with HH ambulation  Short term goal 2: Pt will complete BADL routine with min A and LHAE with no vc for hip precautions to increase indep with ADL routine  Short term goal 3: Pt will complete BUE strengthening exercises to increase strength and endurance needed for ease with ADLs  Short term goal 4: Pt will complete standing ADL wtih SBA and 1-2 hand release for > 4 minutes to increase indep with cooking tasks. Short term goal 5: Pt will complete functional transfers with SBA and no vc for safety including to/from toilet.    Long term goals  Time Frame for Long term goals : 2 weeks  Long term goal 1: Pt will complete simple meal prep

## 2018-03-01 PROBLEM — B96.4 URINARY TRACT INFECTION DUE TO PROTEUS: Status: RESOLVED | Noted: 2018-02-17 | Resolved: 2018-03-01

## 2018-03-01 PROBLEM — R03.0 ELEVATED BP WITHOUT DIAGNOSIS OF HYPERTENSION: Status: RESOLVED | Noted: 2018-02-14 | Resolved: 2018-03-01

## 2018-03-01 PROBLEM — N39.0 E. COLI UTI (URINARY TRACT INFECTION): Status: RESOLVED | Noted: 2018-02-20 | Resolved: 2018-03-01

## 2018-03-01 PROBLEM — B96.20 E. COLI UTI (URINARY TRACT INFECTION): Status: RESOLVED | Noted: 2018-02-20 | Resolved: 2018-03-01

## 2018-03-01 PROBLEM — N39.0 URINARY TRACT INFECTION DUE TO PROTEUS: Status: RESOLVED | Noted: 2018-02-17 | Resolved: 2018-03-01

## 2018-03-01 PROCEDURE — 97530 THERAPEUTIC ACTIVITIES: CPT

## 2018-03-01 PROCEDURE — 6370000000 HC RX 637 (ALT 250 FOR IP): Performed by: ORTHOPAEDIC SURGERY

## 2018-03-01 PROCEDURE — 97535 SELF CARE MNGMENT TRAINING: CPT

## 2018-03-01 PROCEDURE — 97116 GAIT TRAINING THERAPY: CPT

## 2018-03-01 PROCEDURE — 6360000002 HC RX W HCPCS: Performed by: ORTHOPAEDIC SURGERY

## 2018-03-01 PROCEDURE — 6370000000 HC RX 637 (ALT 250 FOR IP): Performed by: PHYSICAL MEDICINE & REHABILITATION

## 2018-03-01 PROCEDURE — 97110 THERAPEUTIC EXERCISES: CPT

## 2018-03-01 PROCEDURE — 6370000000 HC RX 637 (ALT 250 FOR IP): Performed by: FAMILY MEDICINE

## 2018-03-01 PROCEDURE — 1180000000 HC REHAB R&B

## 2018-03-01 RX ADMIN — OXYCODONE HYDROCHLORIDE AND ACETAMINOPHEN 250 MG: 500 TABLET ORAL at 08:31

## 2018-03-01 RX ADMIN — ACETAMINOPHEN 650 MG: 325 TABLET ORAL at 22:20

## 2018-03-01 RX ADMIN — FERROUS SULFATE TAB 325 MG (65 MG ELEMENTAL FE) 325 MG: 325 (65 FE) TAB at 17:10

## 2018-03-01 RX ADMIN — VITAMIN D, TAB 1000IU (100/BT) 5000 UNITS: 25 TAB at 08:32

## 2018-03-01 RX ADMIN — ACETAMINOPHEN 650 MG: 325 TABLET ORAL at 06:28

## 2018-03-01 RX ADMIN — PHENYTOIN SODIUM 100 MG: 100 CAPSULE ORAL at 08:32

## 2018-03-01 RX ADMIN — PHENYTOIN SODIUM 100 MG: 100 CAPSULE ORAL at 21:14

## 2018-03-01 RX ADMIN — FERROUS SULFATE TAB 325 MG (65 MG ELEMENTAL FE) 325 MG: 325 (65 FE) TAB at 08:31

## 2018-03-01 RX ADMIN — OXYCODONE HYDROCHLORIDE AND ACETAMINOPHEN 250 MG: 500 TABLET ORAL at 17:10

## 2018-03-01 RX ADMIN — ENOXAPARIN SODIUM 30 MG: 30 INJECTION SUBCUTANEOUS at 21:14

## 2018-03-01 RX ADMIN — ACETAMINOPHEN 650 MG: 325 TABLET ORAL at 12:52

## 2018-03-01 RX ADMIN — DOCUSATE SODIUM 100 MG: 100 CAPSULE ORAL at 08:32

## 2018-03-01 RX ADMIN — ENOXAPARIN SODIUM 30 MG: 30 INJECTION SUBCUTANEOUS at 08:31

## 2018-03-01 ASSESSMENT — PAIN DESCRIPTION - ORIENTATION
ORIENTATION: RIGHT
ORIENTATION: RIGHT

## 2018-03-01 ASSESSMENT — PAIN DESCRIPTION - PAIN TYPE
TYPE: CHRONIC PAIN
TYPE: CHRONIC PAIN

## 2018-03-01 ASSESSMENT — PAIN SCALES - GENERAL
PAINLEVEL_OUTOF10: 1
PAINLEVEL_OUTOF10: 2
PAINLEVEL_OUTOF10: 0
PAINLEVEL_OUTOF10: 0
PAINLEVEL_OUTOF10: 4
PAINLEVEL_OUTOF10: 0

## 2018-03-01 ASSESSMENT — PAIN DESCRIPTION - DESCRIPTORS
DESCRIPTORS: ACHING
DESCRIPTORS: ACHING

## 2018-03-01 ASSESSMENT — PAIN DESCRIPTION - LOCATION
LOCATION: KNEE
LOCATION: KNEE

## 2018-03-01 NOTE — PROGRESS NOTES
discharge    Patient Education:  Patient Education: importance of bloodflow in legs     Equipment Recommendations:  Equipment Needed: Yes (Pt has RW. Continue to assess need for wheelchair.)    Safety:  Type of devices: All fall risk precautions in place, Call light within reach, Chair alarm in place, Left in chair    Plan:  Times per week: 5x/week 90 min, 1x/week 30 min   Times per day: Daily  Specific instructions for Next Treatment: continue bed mobility, transfer and gait training, BLE strengthening, review precautions  Current Treatment Recommendations: Strengthening, ROM, Balance Training, Functional Mobility Training, Transfer Training, Gait Training, Endurance Training, Home Exercise Program, Safety Education & Training, Patient/Caregiver Education & Training    Goals:  Patient goals : To return home     Short term goals  Time Frame for Short term goals: 1 wk  Short term goal 1: Patient will perform bed mobility Min +1 to decrease risk of pressure ulcers  MET  Short term goal 2: Patient will perform functional transfers Min +1, consistently, to sit in bedside chair  MET  Short term goal 3: Patient will ambulate >= 25 ft x1 with  RW and CGA to progress to home mobility. MET  Short term goal 4: Patient will perform car transfer min +1 for transportation needs. Short term goal 5: Pt to walk with RW on/off elevator, min +1 , for access to condo. MET    Long term goals  Time Frame for Long term goals : 3 wks  NO LTG's MET  Long term goal 1: Pt will be Mod I with bed mobility so pt can get in and out of bed to go to the bathroom    Long term goal 2: Pt will perform t/f with Mod I from various surfaces to get up to go to the bathroom  Long term goal 3: Pt will amb with RW >= 50 ft, with RW, Mod I for home mobility. Long term goal 4: Pt will perform car t/f with CGA x 1 for discharge home   Long term goal 5: Pt will ambulate on/off elevator with RW, SBA, for access to condo.

## 2018-03-01 NOTE — PROGRESS NOTES
than the one here\" and became agitated when encouragement given.     Pain:  Pain Assessment  Patient Currently in Pain: Denies     Objective  Overall Cognitive Status: Exceptions (anxiety noted, perseveration at times)    Bed mobility  Supine to Sit: Minimal assistance (for advancing L LE)  Scooting: Stand by assistance (with extra time needed)    Transfers  Sit to stand: Stand by assistance (from EOB multiple times )  Stand to sit: Stand by assistance (to EOB and chair at slow pace)     Balance  Sitting Balance: Modified independent   Standing Balance: Stand by assistance     Time: x 1 min x 3 trials, 3 mins x 1 trial and 2 mins x 2 trials  Activity: for completion of ADLs     Functional Mobility  Functional - Mobility Device: Rolling Walker  Activity: To/from bathroom  Assist Level: Contact guard assistance  Functional Mobility Comments: Pt ambulates at slow, hesitant pace due to fear of falling       Eatin - Feeds self with setup/supervision/cues and/or requires only setup/supervision/cues to perform tube feedings                                                                                Groomin - Requires setup/cues to do all tasks (standing at sink to wash hands and brush teeth)                                                                                Bathin - Able to bathe 8-9 areas (seated EOB with assist to wash B LEs belwo knees)                                                                              Dressing-Upper: 5 - Requires setup/supervision/cues and/or requires assist with presthesis/brace only (donning bra and shirt)                                                                               Dressing-Lower: 4 - Requires assist with buttons/zippers/shoelaces and/or assist with shoes only (doffing and donning pants and donning shorts using LHAE with extra time needed)                                                                               Toiletin - Requires setup/supervision/cues (SBA during clothing management)                                                                               Toilet Transfer: 5 - Requires setup/supervision/cues (from RTS)                                                                                                                                                          Shower Transfer:  NA Per MIRELES, pt ademately refused a shower this date despite encouragement. Activity Tolerance:  Activity Tolerance: Patient Tolerated treatment well    Assessment:     Performance deficits / Impairments: Decreased functional mobility , Decreased ADL status, Decreased endurance, Decreased balance, Decreased strength, Decreased safe awareness, Decreased high-level IADLs  Prognosis: Good  Discharge Recommendations: Continue to assess pending progress    Patient Education:  Patient Education: safety wtih transfers and ambulation, balance, ADLs, LHAE, increasing participation in BADLs  Barriers to Learning: Anxiety     Equipment Recommendations:  Equipment Needed: Yes  Mobility Devices: ADL Assistive Devices  ADL Assistive Devices: Sock-Aid Hard, Long-handled Sponge, Long-handled [employer] [place], Reacher  Other: Lisset Dozier - pt educated on purchase options if desires. Pt wants to talk with  at discharge    Safety:  Safety Devices in place: Yes  Type of devices:  All fall risk precautions in place, Call light within reach, Chair alarm in place, Gait belt, Left in chair    Plan:  Times per week: 5x/week x 90 minutes and 1x/week x 30 minutes  Current Treatment Recommendations: Strengthening, Balance Training, Functional Mobility Training, Endurance Training, Patient/Caregiver Education & Training, Self-Care / ADL, Safety Education & Training, Home Management Training, Equipment Evaluation, Education, & procurement    Goals:  Patient goals : \" I want to go home and be with my \" Short term goals  Time Frame for Short term goals: 1 week  Short term goal 1: Pt will complete functional mobility to/from bathroom with SBA and RW with no vc for safety to increase indep with HH ambulation  Short term goal 2: Pt will complete BADL routine with min A and LHAE with no vc for hip precautions to increase indep with ADL routine  Short term goal 3: Pt will complete BUE strengthening exercises to increase strength and endurance needed for ease with ADLs  Short term goal 4: Pt will complete standing ADL wtih SBA and 1-2 hand release for > 4 minutes to increase indep with cooking tasks. Short term goal 5: Pt will complete functional transfers with SBA and no vc for safety including to/from toilet. Long term goals  Time Frame for Long term goals : 2 weeks  Long term goal 1: Pt will complete simple meal prep while standing with SBA and min vc for safety to increase indep within home environment. Long term goal 2: Pt will complete functional mobility household distances with SBA and min vc for safety to increase indep with ADLs/IADLs in home environment.

## 2018-03-01 NOTE — PROGRESS NOTES
Pt. Awake at 0700 and  For therapy. Voices feeling exhausted from therapy despite sleeping well. Hip incision approx. And healing.

## 2018-03-01 NOTE — PLAN OF CARE
Problem: Falls - Risk of  Goal: Absence of falls  Outcome: Met This Shift  Uses call light    Problem: Risk for Impaired Skin Integrity  Goal: Tissue integrity - skin and mucous membranes  Structural intactness and normal physiological function of skin and  mucous membranes. Outcome: Met This Shift  Intact with incision approx. Problem: Bleeding:  Goal: Will show no signs and symptoms of excessive bleeding  Will show no signs and symptoms of excessive bleeding   Outcome: Met This Shift  No s/s    Problem: Discharge Planning:  Goal: Knowledge of discharge instructions  Knowledge of discharge instructions   Outcome: Met This Shift  Aware of discharge plans    Problem: Infection - Surgical Site:  Goal: Signs of wound healing will improve  Signs of wound healing will improve   Outcome: Met This Shift  Incision healing    Problem: Pain Control  Goal: Maintain pain level at or below patient's acceptable level (or 5 if patient is unable to determine acceptable level)  Outcome: Met This Shift  With pain meds    Problem: Anxiety:  Goal: Level of anxiety will decrease  Level of anxiety will decrease   Outcome: Met This Shift  calm    Comments: Care plan reviewed with patient and verbalize understanding of the plan of care and contribute to goal setting.

## 2018-03-02 VITALS
HEIGHT: 67 IN | TEMPERATURE: 97.9 F | BODY MASS INDEX: 23.75 KG/M2 | RESPIRATION RATE: 16 BRPM | HEART RATE: 82 BPM | WEIGHT: 151.3 LBS | OXYGEN SATURATION: 96 % | SYSTOLIC BLOOD PRESSURE: 160 MMHG | DIASTOLIC BLOOD PRESSURE: 71 MMHG

## 2018-03-02 LAB
HCT VFR BLD CALC: 28.1 % (ref 37–47)
HEMOGLOBIN: 9 GM/DL (ref 12–16)
VITAMIN D 25-HYDROXY: 27 NG/ML (ref 30–100)

## 2018-03-02 PROCEDURE — 82306 VITAMIN D 25 HYDROXY: CPT

## 2018-03-02 PROCEDURE — 36415 COLL VENOUS BLD VENIPUNCTURE: CPT

## 2018-03-02 PROCEDURE — 6370000000 HC RX 637 (ALT 250 FOR IP): Performed by: FAMILY MEDICINE

## 2018-03-02 PROCEDURE — 97116 GAIT TRAINING THERAPY: CPT

## 2018-03-02 PROCEDURE — 85018 HEMOGLOBIN: CPT

## 2018-03-02 PROCEDURE — 97110 THERAPEUTIC EXERCISES: CPT

## 2018-03-02 PROCEDURE — 6360000002 HC RX W HCPCS: Performed by: ORTHOPAEDIC SURGERY

## 2018-03-02 PROCEDURE — 85014 HEMATOCRIT: CPT

## 2018-03-02 PROCEDURE — 6370000000 HC RX 637 (ALT 250 FOR IP): Performed by: ORTHOPAEDIC SURGERY

## 2018-03-02 PROCEDURE — 6370000000 HC RX 637 (ALT 250 FOR IP): Performed by: PHYSICAL MEDICINE & REHABILITATION

## 2018-03-02 PROCEDURE — 97530 THERAPEUTIC ACTIVITIES: CPT

## 2018-03-02 RX ORDER — ACETAMINOPHEN 325 MG/1
650 TABLET ORAL
Qty: 120 TABLET | Refills: 3 | COMMUNITY
Start: 2018-03-02

## 2018-03-02 RX ORDER — FERROUS SULFATE 325(65) MG
325 TABLET ORAL 2 TIMES DAILY WITH MEALS
Qty: 28 TABLET | Refills: 0 | Status: SHIPPED | OUTPATIENT
Start: 2018-03-02

## 2018-03-02 RX ADMIN — ENOXAPARIN SODIUM 30 MG: 30 INJECTION SUBCUTANEOUS at 08:48

## 2018-03-02 RX ADMIN — VITAMIN D, TAB 1000IU (100/BT) 5000 UNITS: 25 TAB at 08:47

## 2018-03-02 RX ADMIN — OXYCODONE HYDROCHLORIDE AND ACETAMINOPHEN 250 MG: 500 TABLET ORAL at 08:48

## 2018-03-02 RX ADMIN — PHENYTOIN SODIUM 100 MG: 100 CAPSULE ORAL at 08:52

## 2018-03-02 RX ADMIN — ACETAMINOPHEN 650 MG: 325 TABLET ORAL at 14:49

## 2018-03-02 RX ADMIN — FERROUS SULFATE TAB 325 MG (65 MG ELEMENTAL FE) 325 MG: 325 (65 FE) TAB at 08:47

## 2018-03-02 ASSESSMENT — PAIN SCALES - GENERAL
PAINLEVEL_OUTOF10: 0
PAINLEVEL_OUTOF10: 4

## 2018-03-02 ASSESSMENT — PAIN DESCRIPTION - PAIN TYPE: TYPE: SURGICAL PAIN

## 2018-03-02 ASSESSMENT — PAIN DESCRIPTION - ORIENTATION: ORIENTATION: RIGHT

## 2018-03-02 ASSESSMENT — PAIN DESCRIPTION - LOCATION: LOCATION: HIP;KNEE

## 2018-03-02 NOTE — DISCHARGE SUMMARY
Physical Medicine & Rehabilitation  Discharge Summary     Patient Identification:  Kin Davies  : 1934  Admit date: 2018  Discharge date: 3/2/18    Attending provider: Melo Davis MD        Primary care provider: Leyda Gilman MD     Discharge Diagnoses:   Primary impairment requiring rehabilitation: 8.11 Unilateral Hip Fracture     Etiologic Diagnosis that led to the condition: Right closed displaced intertrochanteric femur fracture. Comorbid conditions affecting rehabilitation:  1. S/p RIGHT total hip arthroplasty by Dr. Regi Vera on 2018.  2. Fall closed displaced intertrochanteric femur fracture. 3. End-stage osteoarthritis, RIGHT hip. 4. Falls. 5. Gait instability. 6. Post operative anemia. 7. RIGHT shoulder pain. 8. Chronic LBBB . 9. Seizures on chronic dilantin. 10. *Drug induced polyneuropathy. 11. Tachycardia. 12. Hypovitaminosis D.  13. UTI with Proteus mirabilis and E. Coli. 14. Severe osteoporosis. Discharge Functional Status:    Physical therapy:  Bed Mobility: Scooting: Supervision  Transfers: Sit to Stand: Supervision (from bed, mat and one time from [employer]. Minimal assistance 1 x from [employer]. )  Stand to sit: Supervision  Stand Pivot Transfers: Supervision  Comment: Dynamic standing : Patient retrieved items from floor with one UE support on walker using reacher in right UE. Supervision. Completed walking to restroom doffing pants , completed pericare following toileting and used reacher to retrieve pants to mahesh . Supervision. , Ambulation 1  Surface: level tile  Device: Rolling Walker  Assistance: Stand by assistance  Quality of Gait: Slow veronica. Left foot passing right 1/2 step length. Increased weight bearing tolerated on right LE. Slight forward posture.     Distance: 90 ft x 1   Comments: Patient anxious to transition from tile to carpet rolling over threshold.  , Stairs  # Steps : 1  Stairs Height: 6\"  Rails: Bilateral  Curbs: 2\"  Device: Rolling walker  Assistance: Contact guard assistance  Comment: Patient ascending first of 4 step staircase. Patient became anxious and declined to ascending again. Decending with right LE backward. Mobility: Bed, Chair, Wheel Chair: 4 - Requires steadying assistance only <25% assist  and/or requires assist with one leg only  Walk: 2 - Maximal Assistance Requires up to Maximal Assistance AND requires assistance of one person to walk/operate wheelchair between  feet (Patient performs 25-49% of locomotion effort or goes between  feet)  Distance Walked: 65 ft  Wheel Chair: 2 - Maximal Assistance Requires up to [address] requires assistance of one person to walk/operate wheelchair between  feet, PT Equipment Recommendations  Equipment Needed: Yes (Pt has RW. Continue to assess need for wheelchair.), Assessment: Patient using gait belt or UE's to assist right LE onto bed or mat surface. Patient safe with rolling walker and follows THR precautions. Patient unable to initiate right hip flexion in supine without assistance . Patient would benefit from continued skilled PT to improve strength , ROM of right hip , improved functional mobility and increase walking distance tolerance.      Occupational therapy: Eatin - Feeds self with setup/supervision/cues and/or requires only setup/supervision/cues to perform tube feedings  Groomin - Requires setup/cues to do all tasks (standing at sink to wash hands and brush teeth)  Bathin - Able to bathe 8-9 areas (seated EOB with assist to wash B LEs belwo knees)  Dressing-Upper: 5 - Requires setup/supervision/cues and/or requires assist with presthesis/brace only (donning bra and shirt)  Dressing-Lower: 4 - Requires assist with buttons/zippers/shoelaces and/or assist with shoes only (doffing and donning pants and donning shorts using LHAE with extra time needed)  Toiletin - Requires setup/supervision/cues (SBA during clothing management)  Toilet Transfer: 5 - Requires setup/supervision/cues (from RTS)  Shower Transfer: 0 - Activity does not occur,  , Assessment: Pt. is progressing with OT has met all short term goals this week. She regularly needs mod/max encouragement to participate and frequent education on role of OT. Pt. is completing transfers and ambulation with CGA. Pt. is resisitant to using [employer], stating her  will assist her, however education has been provided. Pt. is anxious. Pt. would benefit from contniued OT to increase strength and endurance and increase independence with ADLs/IADLs    Speech therapy:  Comprehension: 6 - Complex ideas 90% or device (hearing aid/glasses)  Expression: 7 - Patient expresses complex ideas/needs  Social Interaction: 6 - Patient requires medication for mood and/or effect  Problem Solvin - Patient able to solve simple/routine tasks  Memory: 5 - Patient requires prompting with stress/unfamiliar situations    Inpatient Rehabilitation Course:   Maria Elena Buchanan is a 80 y.o. female admitted to inpatient rehabilitation on 2018. Original admission 2018 after a ground level where she lost her balance while walking into her bathroom. She fell on to her RIGHT side and was unable to stand afterwards, and crawled to the hallway to get help from her . She had no LOC with this fall. Corina Ceja showed an acute appearing impacted intertrochanteric fracture of the proximal RIGHT femur without dislocation of the RIGHT femoral head.  She underwent a RIGHT total hip arthroplasty by Dr. Clarisa Raygoza on 2018.     She did have a fall two months ago which resulted in RIGHT shoulder pain, which has been persistent since her fall.  She was initially referred to [facility] following her fall in December, but did not follow up.     On initial consultation exam she states her pain is 0/10 at rest and no number given for movement with therapy. Azul Kerr is currently only tramadol for pain control and is controlled with this medication.  No BM since admission; but is passing gas.  She has a spouse that cannot help after discharge and she would like a stay on the inpatient rehabilitation unit to improve ability for self care and mobility  She does live with her spouse in a condo. Medical course on the inpatient rehabilitation unit was notable for treatment of a UTI and mild improvement of post operative anemia. Details are provided below. The patient progressed well with the offered therapies and was discharged to home with Home Health Physical Therapy, Occupational Therapy, Nursing and an Aide. Issues addressed during rehabilitation stay and medication changes:   Acute/Rehabilitation Problems:  1. S/p RIGHT total hip arthroplasty by Dr. Holly Salter on 02/14/2018. 1. Pain control. 1. Tramadol. 1. Last 3 day usage none. States that it makes her groggy. Discontinued on 2/22. 2. Tylenol. 1. Will schedule q4H when awake with additional order for q4 PRN. 3. Lidoderm patch started 2/28 to RIGHT hip area. Discontinued due to lack of efficacy. 2. DVT PPx.  1. 21 days on Lovenox per Dr. Lakshmi Isaac protocol. Norma Peterson 3/8  3. Wound care. 2. Falls/Gait instability. 1. PT/OT. 1. Ambulated 125' at [facility] with RW. Did 4 6\" steps. 3. Post operative anemia. 1.  Hgb 13.4 on 2/12 and 8.8 0n 2/15/2018. 2. Hgb 2/17 was 8.0. Repeat on 2/18 was 8.5.  3. BID iron supplements started 2/17. Continue until discharge. 4. Hgb 8.1 on 2/23. Improved to 9.4 on 2/26. 4. Nutrition:  Consultation to dietician for nutritional counseling and recommendations.  TotP 5.9, alb 2.9, Vitamin 25OH level of 26 on 2/17/2018. 1. Cholecalciferol. 5. Bladder: Urine malodorous, UA/Reflex ordered 2/17. Positive for UTI. 6. Bowel: Senna, colace, MOM Last BM 3/2.  1. Senna 4 tablets. 2. Glycolax daily. 3. Colace TID. 4. Decrease once having BMs. De-escalated bowel medications on 2/20. Chronic/Resolved Problems:  1.  RIGHT shoulder pain.  1. Pain control as above. 2. Chronic LBBB 2004. 1. Noted on EKG 2/16/2018. 3. Seizures on chronic dilantin.  Last in 1988. 1. Dose dilantin per home regimen. 2. Last Dilantin Free/Total 12.2 on 5/31/2017. 4. Drug induced polyneuropathy. 1. No current medications. 5. Tachycardia. 1. EKG on 2/16/2018 showed sinus tachycardia at 101 with known chronic LBBB. 2. Likely due to anxiety and noted anemia. 3. Last 24 hours 90-91 on 2/21. Resolved. 6. UTI with Proteus mirabilis and E. Coli. 1. Monurol ordered. End 2/29. 2. Dr. Huan Chavez ordered ciprofloxacin to which the Proteus is not sensitive; but Monurol will cover. 3. Repeat UA on 2/25. Showed only few bacteria. NGTD on 3/2.     Labs:  1. 2/17.  2. 2/18.  3. 2/25.     Infectious Disease:  1. UTI with Proteus mirabilis and E. Coli. Resolved. The patient participated in an aggressive multidisciplinary inpatient rehabilitation program involving 3 hours per day, 5 days per week of rehabilitation. Estimated Length of Stay: 3/2/2018  Destination: home health  Appropriate to trial functional independence apartment stay: No   Pass: No  Services at Discharge: [address], Occupational Therapy, Nursing and an Aide 3x week  Equipment at Discharge: Hip kit      Appropriate DVT prophylaxis options were considered throughout rehabilitation stay. DME:  None.     Consults:   Orthopedic Surgery, Cardiology, Family Medicine, Physical Medicine    Significant Diagnostics:   CBC:   Lab Results   Component Value Date    WBC 5.2 02/18/2018    RBC 2.42 02/18/2018    RBC 4.12 05/31/2017    HGB 9.0 03/02/2018    HCT 28.1 03/02/2018    .7 02/18/2018    MCH 35.2 02/18/2018    MCHC 34.3 02/18/2018    RDW 13.9 02/18/2018     02/18/2018    MPV 7.8 02/18/2018     CMP:    Lab Results   Component Value Date     02/17/2018    K 4.0 02/17/2018     02/17/2018    CO2 22 02/17/2018    BUN 23 02/17/2018    CREATININE 0.6 02/17/2018

## 2018-03-02 NOTE — PROGRESS NOTES
Alex Hurt 60  INPATIENT OCCUPATIONAL THERAPY  [facility]  DISCHARGE NOTE    Time:  Time In: 0900  Time Out: 5662  Minutes: 23         Date: 3/2/2018  Patient Name: Khushi Menendez,   Gender: female      MRN: [id number]  : 1934  (80 y.o.)  Referring Practitioner: Ordering and attending prov: Ronak Vega MD  Diagnosis: Status Post Right Hip Replacement   Additional Pertinent Hx: R JADA on  by Dr Gordy Sauer    Restrictions/Precautions:  Restrictions/Precautions: Weight Bearing, General Precautions, Fall Risk    Right Lower Extremity Weight Bearing: Weight Bearing As Tolerated       Position Activity Restriction  Hip Precautions: No hip flexion > 90 degrees, No ADduction, No hip internal rotation  Other position/activity restrictions: Dr. Jaz Sauer THR: no bending >90, no crossing midline, no twisting/pivoring, no IR         Past Medical History:   Diagnosis Date    Drug-induced polyneuropathy ([facility] [address] 2016    Fall due to slipping on ice or snow 2014    History of colon cancer 2004    well diff.  andeno    Hyperplastic colon polyp     Irritable bowel syndrome     Osteoarthritis     Osteoporosis     Seizures ([address] 1969      last seizure    Sprain and strain right ankle     Past Surgical History:   Procedure Laterality Date    CATARACT REMOVAL  2000    right  pajka    CATARACT REMOVAL      left and right    COLECTOMY  2004    low anterior resection    COLONOSCOPY  2011    colon ca 2004    neidich    HYSTERECTOMY  1988    tahbso    VA TOTAL HIP ARTHROPLASTY Right 2018    RIGHT HIP TOTAL ARTHROPLASTY performed by Julita Lobo MD at [facility]: Pt. refusing to dress or complete grooming at this time as she is leaving later today       Pain:  Pain Assessment  Patient Currently in Pain: Denies       Objective  Overall Cognitive Status: Exceptions         OT FIM ASSESSMENT:     Admission score Current score Goal Goal Status transfers and ambulaion with SBA. Prognosis: Good  Discharge Recommendations: Home with assist PRN, Home with Home health OT    Patient Education:  Patient Education: goals and progress  Barriers to Learning: Anxiety     Equipment Recommendations:  Equipment Needed: Yes  Mobility Devices: ADL Assistive Devices  ADL Assistive Devices: Sock-Aid Hard, Long-handled Sponge, Long-handled Shoe [place], Reacher  Other: Lorie Kind - pt educated on purchase options if desires. Pt wants to talk with  at discharge    Safety:  Safety Devices in place: Yes  Type of devices: All fall risk precautions in place, Call light within reach, Chair alarm in place, Gait belt, Left in chair    Plan:  Times per week: Home with assist and HH    Goals:  Patient goals : \" I want to go home and be with my \"     Short term goals  Time Frame for Short term goals: 1 week  Short term goal 1: Pt will complete functional mobility to/from bathroom with SBA and RW with no vc for safety to increase indep with HH ambulation MET  Short term goal 2: Pt will complete BADL routine with min A and LHAE with no vc for hip precautions to increase indep with ADL routine NOT MET  Short term goal 3: Pt will complete BUE strengthening exercises to increase strength and endurance needed for ease with ADLs MET  Short term goal 4: Pt will complete standing ADL wtih SBA and 1-2 hand release for > 4 minutes to increase indep with cooking tasks. MET  Short term goal 5: Pt will complete functional transfers with SBA and no vc for safety including to/from toilet. Long term goals  Time Frame for Long term goals : 2 weeks  Long term goal 1: Pt will complete simple meal prep while standing with SBA and min vc for safety to increase indep within home environment. NOT MET  Long term goal 2: Pt will complete functional mobility household distances with SBA and min vc for safety to increase indep with ADLs/IADLs in home environment.  NOT MET

## 2018-03-05 ENCOUNTER — CARE COORDINATION (OUTPATIENT)
Dept: CASE MANAGEMENT | Age: 83
End: 2018-03-05

## 2018-03-05 DIAGNOSIS — Z96.641 STATUS POST TOTAL REPLACEMENT OF RIGHT HIP: Primary | ICD-10-CM

## 2018-03-05 PROCEDURE — 1111F DSCHRG MED/CURRENT MED MERGE: CPT | Performed by: FAMILY MEDICINE

## 2018-03-09 ENCOUNTER — CARE COORDINATION (OUTPATIENT)
Dept: CASE MANAGEMENT | Age: 83
End: 2018-03-09

## 2018-03-13 ENCOUNTER — CARE COORDINATION (OUTPATIENT)
Dept: CASE MANAGEMENT | Age: 83
End: 2018-03-13

## 2018-03-16 ENCOUNTER — CARE COORDINATION (OUTPATIENT)
Dept: CASE MANAGEMENT | Age: 83
End: 2018-03-16

## 2018-03-20 ENCOUNTER — TELEPHONE (OUTPATIENT)
Dept: FAMILY MEDICINE CLINIC | Age: 83
End: 2018-03-20

## 2018-03-20 NOTE — TELEPHONE ENCOUNTER
Spoke with Heron Gallo. Introduced self/role. Heron Gallo recently 2/12/18, suffered a intertrochanteric femur fracture. Family concerned about her due to her  was admitted to a nursing home recently after he presented to [facility] with 2 falls and inability to get  up. He has been getting weaker. Heron Gallo voiced \" she cant wait till Bevtoft gets home\"! Estelita's voiced it was lonely without  him. Writer asked if there was any services she could use while Bevtoft is away such as nursing or an aid to help with house work? Heron Gallo voiced she didn't need it right now and that she has a neighbor that comes over every day to help her. Writer voiced to Heron Sabino that she will be calling to see how she is getting along and if there are any services she will need at ant time. Heron Sabino reminded to contact the office should she have any questions or concerns.

## 2018-03-22 ENCOUNTER — TELEPHONE (OUTPATIENT)
Dept: FAMILY MEDICINE CLINIC | Age: 83
End: 2018-03-22

## 2018-03-26 ENCOUNTER — TELEPHONE (OUTPATIENT)
Dept: FAMILY MEDICINE CLINIC | Age: 83
End: 2018-03-26

## 2018-03-26 DIAGNOSIS — Z96.641 STATUS POST TOTAL REPLACEMENT OF RIGHT HIP: Primary | ICD-10-CM

## 2018-03-27 NOTE — TELEPHONE ENCOUNTER
Let him know that I will reorder the therapy but she must promise to allow them into their apartment and work with them.

## 2018-03-28 ENCOUNTER — TELEPHONE (OUTPATIENT)
Dept: FAMILY MEDICINE CLINIC | Age: 83
End: 2018-03-28

## 2018-04-03 DIAGNOSIS — R56.9 PARTIAL SEIZURE (HCC): ICD-10-CM

## 2018-04-03 RX ORDER — PHENYTOIN SODIUM 100 MG/1
CAPSULE, EXTENDED RELEASE ORAL
Qty: 250 CAPSULE | Refills: 1 | Status: SHIPPED | OUTPATIENT
Start: 2018-04-03 | End: 2018-10-12 | Stop reason: SDUPTHER

## 2018-04-11 ENCOUNTER — TELEPHONE (OUTPATIENT)
Dept: FAMILY MEDICINE CLINIC | Age: 83
End: 2018-04-11

## 2018-04-11 DIAGNOSIS — S72.001A FRACTURED HIP, RIGHT, CLOSED, INITIAL ENCOUNTER (HCC): Primary | ICD-10-CM

## 2018-04-12 ENCOUNTER — TELEPHONE (OUTPATIENT)
Dept: FAMILY MEDICINE CLINIC | Age: 83
End: 2018-04-12

## 2018-05-07 ENCOUNTER — TELEPHONE (OUTPATIENT)
Dept: FAMILY MEDICINE CLINIC | Age: 83
End: 2018-05-07

## 2018-05-30 ENCOUNTER — TELEPHONE (OUTPATIENT)
Dept: FAMILY MEDICINE CLINIC | Age: 83
End: 2018-05-30

## 2018-06-18 ENCOUNTER — TELEPHONE (OUTPATIENT)
Dept: FAMILY MEDICINE CLINIC | Age: 83
End: 2018-06-18

## 2018-06-18 DIAGNOSIS — G62.0 DRUG-INDUCED POLYNEUROPATHY (HCC): Primary | ICD-10-CM

## 2018-06-18 DIAGNOSIS — Z96.641 STATUS POST TOTAL REPLACEMENT OF RIGHT HIP: ICD-10-CM

## 2018-06-21 ENCOUNTER — HOSPITAL ENCOUNTER (EMERGENCY)
Age: 83
Discharge: HOME | End: 2018-06-21
Payer: MEDICARE

## 2018-06-21 ENCOUNTER — HOSPITAL ENCOUNTER (OUTPATIENT)
Dept: INTERVENTIONAL RADIOLOGY/VASCULAR | Age: 83
Discharge: HOME | End: 2018-06-21
Payer: MEDICARE

## 2018-06-21 VITALS
HEART RATE: 66 BPM | TEMPERATURE: 97.6 F | OXYGEN SATURATION: 98 % | DIASTOLIC BLOOD PRESSURE: 67 MMHG | BODY MASS INDEX: 21.69 KG/M2 | SYSTOLIC BLOOD PRESSURE: 142 MMHG | WEIGHT: 135 LBS | RESPIRATION RATE: 20 BRPM | HEIGHT: 66 IN

## 2018-06-21 DIAGNOSIS — I82.411 ACUTE DEEP VEIN THROMBOSIS (DVT) OF FEMORAL VEIN OF RIGHT LOWER EXTREMITY (HCC): Primary | ICD-10-CM

## 2018-06-21 DIAGNOSIS — R60.9 SWELLING: ICD-10-CM

## 2018-06-21 DIAGNOSIS — R52 PAIN: ICD-10-CM

## 2018-06-21 LAB
ANION GAP SERPL CALCULATED.3IONS-SCNC: 11 MEQ/L (ref 8–16)
ANISOCYTOSIS: ABNORMAL
BASOPHILS # BLD: 0.6 %
BASOPHILS ABSOLUTE: 0 THOU/MM3 (ref 0–0.1)
BUN BLDV-MCNC: 20 MG/DL (ref 7–22)
CALCIUM SERPL-MCNC: 9.8 MG/DL (ref 8.5–10.5)
CHLORIDE BLD-SCNC: 103 MEQ/L (ref 98–111)
CO2: 27 MEQ/L (ref 23–33)
CREAT SERPL-MCNC: 0.9 MG/DL (ref 0.4–1.2)
EOSINOPHIL # BLD: 2.9 %
EOSINOPHILS ABSOLUTE: 0.1 THOU/MM3 (ref 0–0.4)
GFR SERPL CREATININE-BSD FRML MDRD: 60 ML/MIN/1.73M2
GLUCOSE BLD-MCNC: 103 MG/DL (ref 70–108)
HCT VFR BLD CALC: 36.6 % (ref 37–47)
HEMOGLOBIN: 12.1 GM/DL (ref 12–16)
LYMPHOCYTES # BLD: 16.1 %
LYMPHOCYTES ABSOLUTE: 0.7 THOU/MM3 (ref 1–4.8)
MACROCYTES: ABNORMAL
MCH RBC QN AUTO: 33.4 PG (ref 27–31)
MCHC RBC AUTO-ENTMCNC: 33.2 GM/DL (ref 33–37)
MCV RBC AUTO: 100.7 FL (ref 81–99)
MONOCYTES # BLD: 8 %
MONOCYTES ABSOLUTE: 0.4 THOU/MM3 (ref 0.4–1.3)
NUCLEATED RED BLOOD CELLS: 0 /100 WBC
OSMOLALITY CALCULATION: 284.1 MOSMOL/KG (ref 275–300)
PDW BLD-RTO: 16.3 % (ref 11.5–14.5)
PLATELET # BLD: 145 THOU/MM3 (ref 130–400)
PMV BLD AUTO: 7.6 FL (ref 7.4–10.4)
POTASSIUM SERPL-SCNC: 4.6 MEQ/L (ref 3.5–5.2)
RBC # BLD: 3.64 MILL/MM3 (ref 4.2–5.4)
SEG NEUTROPHILS: 72.4 %
SEGMENTED NEUTROPHILS ABSOLUTE COUNT: 3.3 THOU/MM3 (ref 1.8–7.7)
SODIUM BLD-SCNC: 141 MEQ/L (ref 135–145)
WBC # BLD: 4.6 THOU/MM3 (ref 4.8–10.8)

## 2018-06-21 PROCEDURE — 80048 BASIC METABOLIC PNL TOTAL CA: CPT

## 2018-06-21 PROCEDURE — 93971 EXTREMITY STUDY: CPT

## 2018-06-21 PROCEDURE — 85025 COMPLETE CBC W/AUTO DIFF WBC: CPT

## 2018-06-21 PROCEDURE — 99284 EMERGENCY DEPT VISIT MOD MDM: CPT

## 2018-06-21 PROCEDURE — 36415 COLL VENOUS BLD VENIPUNCTURE: CPT

## 2018-06-21 ASSESSMENT — ENCOUNTER SYMPTOMS
RHINORRHEA: 0
NAUSEA: 0
BACK PAIN: 0
SHORTNESS OF BREATH: 0
EYE DISCHARGE: 0
VOMITING: 0
EYE PAIN: 0
COUGH: 0
WHEEZING: 0
SORE THROAT: 0
ABDOMINAL PAIN: 0
DIARRHEA: 0

## 2018-06-22 ENCOUNTER — TELEPHONE (OUTPATIENT)
Dept: FAMILY MEDICINE CLINIC | Age: 83
End: 2018-06-22

## 2018-06-22 ENCOUNTER — CARE COORDINATION (OUTPATIENT)
Dept: CASE MANAGEMENT | Age: 83
End: 2018-06-22

## 2018-06-25 ENCOUNTER — CARE COORDINATION (OUTPATIENT)
Dept: CASE MANAGEMENT | Age: 83
End: 2018-06-25

## 2018-06-26 ENCOUNTER — CARE COORDINATION (OUTPATIENT)
Dept: CARE COORDINATION | Age: 83
End: 2018-06-26

## 2018-06-27 ENCOUNTER — CARE COORDINATION (OUTPATIENT)
Dept: CASE MANAGEMENT | Age: 83
End: 2018-06-27

## 2018-06-27 ENCOUNTER — CARE COORDINATION (OUTPATIENT)
Dept: CARE COORDINATION | Age: 83
End: 2018-06-27

## 2018-06-29 ENCOUNTER — OFFICE VISIT (OUTPATIENT)
Dept: FAMILY MEDICINE CLINIC | Age: 83
End: 2018-06-29

## 2018-06-29 VITALS
HEIGHT: 66 IN | DIASTOLIC BLOOD PRESSURE: 64 MMHG | HEART RATE: 68 BPM | RESPIRATION RATE: 14 BRPM | SYSTOLIC BLOOD PRESSURE: 124 MMHG

## 2018-06-29 DIAGNOSIS — I82.4Z1 ACUTE DEEP VEIN THROMBOSIS (DVT) OF DISTAL VEIN OF RIGHT LOWER EXTREMITY (HCC): ICD-10-CM

## 2018-06-29 DIAGNOSIS — R56.9 PARTIAL SEIZURE (HCC): ICD-10-CM

## 2018-06-29 DIAGNOSIS — G62.0 DRUG-INDUCED POLYNEUROPATHY (HCC): ICD-10-CM

## 2018-06-29 DIAGNOSIS — I82.491 ACUTE DEEP VEIN THROMBOSIS (DVT) OF OTHER SPECIFIED VEIN OF RIGHT LOWER EXTREMITY (HCC): Primary | ICD-10-CM

## 2018-06-29 PROBLEM — I82.401 RIGHT LEG DVT (HCC): Status: ACTIVE | Noted: 2018-06-01

## 2018-06-29 PROCEDURE — 99213 OFFICE O/P EST LOW 20 MIN: CPT | Performed by: FAMILY MEDICINE

## 2018-06-29 ASSESSMENT — ENCOUNTER SYMPTOMS
SINUS PRESSURE: 0
CONSTIPATION: 0
SHORTNESS OF BREATH: 0

## 2018-08-07 ENCOUNTER — TELEPHONE (OUTPATIENT)
Dept: FAMILY MEDICINE CLINIC | Age: 83
End: 2018-08-07

## 2018-08-07 DIAGNOSIS — Z96.641 STATUS POST TOTAL REPLACEMENT OF RIGHT HIP: Primary | ICD-10-CM

## 2018-08-09 NOTE — TELEPHONE ENCOUNTER
Pt called and said that she has been waiting for a return call and she would like someone to call her today. She said that she has been waiting 3 days.

## 2018-08-13 ENCOUNTER — TELEPHONE (OUTPATIENT)
Dept: FAMILY MEDICINE CLINIC | Age: 83
End: 2018-08-13

## 2018-08-14 NOTE — TELEPHONE ENCOUNTER
Pt called and I informed her. Said she would be fine with whatever agency doctor feels would do a good job and knows that she had the blood clot. Said she has heard of [facility] but does not know anything about them.

## 2018-08-14 NOTE — TELEPHONE ENCOUNTER
Let her know that [address] [place] [address] is not able to see her right now for the therapy. Does she have a choice of another agency?

## 2018-08-16 NOTE — TELEPHONE ENCOUNTER
Called [facility] and was inst that they will ck epic for ins info and if any ?  Will call they will ck ins coverage

## 2018-10-10 ENCOUNTER — TELEPHONE (OUTPATIENT)
Dept: FAMILY MEDICINE CLINIC | Age: 83
End: 2018-10-10

## 2018-10-12 DIAGNOSIS — R56.9 PARTIAL SEIZURE (HCC): ICD-10-CM

## 2018-10-15 ENCOUNTER — TELEPHONE (OUTPATIENT)
Dept: FAMILY MEDICINE CLINIC | Age: 83
End: 2018-10-15

## 2018-10-15 NOTE — TELEPHONE ENCOUNTER
Pt states that she is suppose to go back appt for the Doppler in  Dec and says that she does not have the transportation to go at this moment. Pt also wants to know if she is suppose to continue to take the Dilantin and the Xarelto together? She says that Dr Michelle Marcus office said not to take the 2meds together. Pt needs Rx for Xarelto and Dilantin if you are consenting.

## 2018-10-17 RX ORDER — PHENYTOIN SODIUM 100 MG/1
CAPSULE, EXTENDED RELEASE ORAL
Qty: 250 CAPSULE | Refills: 1 | Status: SHIPPED | OUTPATIENT
Start: 2018-10-17 | End: 2018-12-31 | Stop reason: SDUPTHER

## 2018-10-23 ENCOUNTER — TELEPHONE (OUTPATIENT)
Dept: FAMILY MEDICINE CLINIC | Age: 83
End: 2018-10-23

## 2018-10-25 ENCOUNTER — TELEPHONE (OUTPATIENT)
Dept: FAMILY MEDICINE CLINIC | Age: 83
End: 2018-10-25

## 2018-10-25 DIAGNOSIS — G62.0 DRUG-INDUCED POLYNEUROPATHY (HCC): Primary | ICD-10-CM

## 2018-10-26 ENCOUNTER — TELEPHONE (OUTPATIENT)
Dept: FAMILY MEDICINE CLINIC | Age: 83
End: 2018-10-26

## 2018-10-26 NOTE — TELEPHONE ENCOUNTER
Zakiya Canas was evaluated and a DME order was entered for a wheeled walker with seat because she requires this to successfully complete daily living tasks of personal cares. A wheeled walker with seat is necessary due to the patient's unsteady gait, upper body weakness, inability to  and ambulation device, ambulating only short distances by pushing a walker, and the need to sit for a short time before resuming ambulation. These tasks cannot be completed with a lesser ambulation device such as a cane, crutch, or standard walker. The need for this equipment was discussed with the patient and she understands and is in agreement.

## 2018-10-29 ENCOUNTER — TELEPHONE (OUTPATIENT)
Dept: INTERNAL MEDICINE CLINIC | Age: 83
End: 2018-10-29

## 2018-10-30 ENCOUNTER — TELEPHONE (OUTPATIENT)
Dept: FAMILY MEDICINE CLINIC | Age: 83
End: 2018-10-30

## 2018-10-30 NOTE — TELEPHONE ENCOUNTER
Pt called stating that they delivered the walker to her and it was to big so they took it back. They advised pt that it was the only size they had in. She is wanting to know if she would be able to get a written RX to take with her somewhere else to see if they have a smaller one with seat. The one they delivered to her it was to big for her condo. She knows she is able to get a smaller one as she has a friend that was over and has smaller one.     Ayleen Parker may be reached at [phone]

## 2018-12-12 ENCOUNTER — TELEPHONE (OUTPATIENT)
Dept: FAMILY MEDICINE CLINIC | Age: 83
End: 2018-12-12

## 2018-12-18 ENCOUNTER — TELEPHONE (OUTPATIENT)
Dept: FAMILY MEDICINE CLINIC | Age: 83
End: 2018-12-18

## 2018-12-19 NOTE — TELEPHONE ENCOUNTER
Pt informed. She said that she cannot come in today and she will be out of the Xarelto starting tomorrow. Sarahy Fernandos that she has people coming in this afternoon so she cannot come in today. Said she has to have this medication.   Said that she wants an answer immediately not this afternoon at 5 pm.

## 2018-12-31 DIAGNOSIS — R56.9 PARTIAL SEIZURE (HCC): ICD-10-CM

## 2018-12-31 RX ORDER — PHENYTOIN SODIUM 100 MG/1
CAPSULE, EXTENDED RELEASE ORAL
Qty: 250 CAPSULE | Refills: 1 | Status: SHIPPED | OUTPATIENT
Start: 2018-12-31 | End: 2019-04-03 | Stop reason: SDUPTHER

## 2019-01-01 NOTE — ED NOTES
Bed: 023A  Expected date: 2/12/18  Expected time: 10:07 PM  Means of arrival: [facility]  Comments:     Jass Franklin  02/12/18 [id number] <<-----Click here for Discharge Medication Review

## 2019-01-02 ENCOUNTER — TELEPHONE (OUTPATIENT)
Dept: FAMILY MEDICINE CLINIC | Age: 84
End: 2019-01-02

## 2019-01-03 DIAGNOSIS — R56.9 PARTIAL SEIZURE (HCC): Primary | ICD-10-CM

## 2019-04-03 DIAGNOSIS — R56.9 PARTIAL SEIZURE (HCC): Primary | ICD-10-CM

## 2019-04-03 RX ORDER — PHENYTOIN SODIUM 100 MG/1
CAPSULE, EXTENDED RELEASE ORAL
Qty: 250 CAPSULE | Refills: 1 | Status: SHIPPED | OUTPATIENT
Start: 2019-04-03 | End: 2020-01-20 | Stop reason: SDUPTHER

## 2019-05-16 LAB
ABSOLUTE BASO #: 0 K/UL (ref 0–0.1)
ABSOLUTE EOS #: 0.1 K/UL (ref 0.1–0.4)
ABSOLUTE LYMPH #: 0.8 K/UL (ref 0.8–5.2)
ABSOLUTE MONO #: 0.4 K/UL (ref 0.1–0.9)
ABSOLUTE NEUT #: 3.6 K/UL (ref 1.3–9.1)
ALBUMIN SERPL-MCNC: 4.1 G/DL (ref 3.2–5.3)
ALK PHOSPHATASE: 120 U/L (ref 39–130)
ALT SERPL-CCNC: 11 U/L (ref 0–31)
AST SERPL-CCNC: 16 U/L (ref 0–41)
BASOPHILS RELATIVE PERCENT: 0.6 %
BILIRUB SERPL-MCNC: 0.4 MG/DL (ref 0.3–1.2)
BILIRUBIN DIRECT: 0.1 MG/DL (ref 0–0.4)
EOSINOPHILS RELATIVE PERCENT: 2.6 %
HCT VFR BLD CALC: 34.6 % (ref 36–48)
HEMOGLOBIN: 12.3 G/DL (ref 12–16)
LYMPHOCYTE %: 15.6 %
MCH RBC QN AUTO: 34.2 PG (ref 27–34)
MCHC RBC AUTO-ENTMCNC: 35.5 G/DL (ref 31–36)
MCV RBC AUTO: 96.1 FL (ref 80–100)
MONOCYTES # BLD: 7.9 %
NEUTROPHILS RELATIVE PERCENT: 73.1 %
PDW BLD-RTO: 12.3 % (ref 10.8–14.8)
PLATELETS: 144 K/UL (ref 150–450)
RBC: 3.6 M/UL (ref 4–5.5)
TOTAL PROTEIN: 6.8 G/DL (ref 6–8)
VITAMIN B-12: 128 PG/ML (ref 180–914)
WBC: 4.9 K/UL (ref 3.7–10.8)

## 2019-05-17 LAB
DILANTIN FREE/TOTAL: 13.9 UG/ML (ref 10–20)
DOSE TIME: NORMAL

## 2019-05-20 LAB — VITAMIN B6: 7 NMOL/L (ref 20–125)

## 2019-05-21 ENCOUNTER — TELEPHONE (OUTPATIENT)
Dept: NEUROLOGY | Age: 84
End: 2019-05-21

## 2019-05-21 DIAGNOSIS — R56.9 PARTIAL SEIZURE (HCC): Primary | ICD-10-CM

## 2019-05-21 NOTE — TELEPHONE ENCOUNTER
----- Message from Alison Pérez MD sent at 5/20/2019 11:09 PM EDT -----  Please ask patient to start B6 supplements daily over the counter (preferably 50 mg daily), not multivitamins. The level is low =7. Repeat level in two months.

## 2019-05-21 NOTE — TELEPHONE ENCOUNTER
----- Message from Jumana Mackenzie MD sent at 5/20/2019 11:10 PM EDT -----  Please ask patient to start B12 sublingual supplements over the counter daily not multivitamins (preferably more than 3000mcg/day). Level is low 128. Repeat in two to three months.

## 2019-05-24 ENCOUNTER — OFFICE VISIT (OUTPATIENT)
Dept: NEUROLOGY | Age: 84
End: 2019-05-24
Payer: MEDICARE

## 2019-05-24 VITALS
HEART RATE: 80 BPM | WEIGHT: 116 LBS | HEIGHT: 63 IN | SYSTOLIC BLOOD PRESSURE: 112 MMHG | BODY MASS INDEX: 20.55 KG/M2 | DIASTOLIC BLOOD PRESSURE: 62 MMHG

## 2019-05-24 DIAGNOSIS — R56.9 PARTIAL SEIZURE (HCC): Primary | ICD-10-CM

## 2019-05-24 PROCEDURE — 1036F TOBACCO NON-USER: CPT | Performed by: NURSE PRACTITIONER

## 2019-05-24 PROCEDURE — G8420 CALC BMI NORM PARAMETERS: HCPCS | Performed by: NURSE PRACTITIONER

## 2019-05-24 PROCEDURE — 4040F PNEUMOC VAC/ADMIN/RCVD: CPT | Performed by: NURSE PRACTITIONER

## 2019-05-24 PROCEDURE — 99213 OFFICE O/P EST LOW 20 MIN: CPT | Performed by: NURSE PRACTITIONER

## 2019-05-24 PROCEDURE — G8400 PT W/DXA NO RESULTS DOC: HCPCS | Performed by: NURSE PRACTITIONER

## 2019-05-24 PROCEDURE — G8427 DOCREV CUR MEDS BY ELIG CLIN: HCPCS | Performed by: NURSE PRACTITIONER

## 2019-05-24 PROCEDURE — 1123F ACP DISCUSS/DSCN MKR DOCD: CPT | Performed by: NURSE PRACTITIONER

## 2019-05-24 PROCEDURE — 1090F PRES/ABSN URINE INCON ASSESS: CPT | Performed by: NURSE PRACTITIONER

## 2019-05-24 NOTE — PROGRESS NOTES
NEUROLOGY OUT PATIENT FOLLOW UP NOTE:  5/24/20191:26 PM    George Allen is here for follow up for seizure. She is doing well, she denies any seizure. She is currently on Dilantin. She was found to have low vitamin B12 and B6 levels and is  taking supplements. She is dealing with the recent loss of her . She is here to discuss the plan of care going forward. ROS:  Respiratory : no cough, no shortness of breath  Cardiac: no chest pain. No palpitations. Renal : no flank pain, no hematuria    Skin: no rash      Allergies   Allergen Reactions    Cefuroxime Axetil      Diarrhea  Has had keflex and amoxil before    Sulfa Antibiotics        Current Outpatient Medications:     DILANTIN 100 MG ER capsule, TAKE 3 CAPSULES BY MOUTH EVERY DAY ALTERNATING WITH 2 CAPS EVERY DAY, Disp: 250 capsule, Rfl: 1    acetaminophen (TYLENOL) 325 MG tablet, Take 2 tablets by mouth every 4 hours (while awake), Disp: 120 tablet, Rfl: 3    Cholecalciferol (VITAMIN D PO), Take 2,000 mg by mouth daily. , Disp: , Rfl:     rivaroxaban (XARELTO) 20 MG TABS tablet, Take 1 tablet by mouth daily (with breakfast), Disp: 30 tablet, Rfl: 0    ferrous sulfate 325 (65 Fe) MG tablet, Take 1 tablet by mouth 2 times daily (with meals), Disp: 28 tablet, Rfl: 0    diclofenac sodium (VOLTAREN) 1 % GEL, Apply 4 g topically 4 times daily RIGHT knee, Disp: 5 Tube, Rfl: 0      PE:   Vitals:    05/24/19 1309   BP: 112/62   Site: Left Upper Arm   Position: Sitting   Cuff Size: Small Adult   Pulse: 80   Weight: 116 lb (52.6 kg)   Height: 5' 2.99\" (1.6 m)     General Appearance:  awake, alert, oriented, in no acute distress  Gen: NAD, Language is Intact. Head: no rash, no icterus  Neck: There is no carotid bruits. The Neck is supple. Neuro: CN 2-12 grossly intact with no focal deficits. Power 5/5 Throughout symmetric, Reflexes are decreased and symmetric. Long tracts are intact. Cerebellar exam is Intact. Sensory exam is intact to light touch. Gait is guarded, she is using Walker. Musculoskeletal:  Has no hand arthritis, no limitation of ROM in any of the four extremities. Lower extremities no edema        DATA:  Results for orders placed or performed in visit on 05/15/19   Hepatic Function Panel   Result Value Ref Range    Total Bilirubin 0.4 0.3 - 1.2 mg/dL    Bilirubin, Direct 0.1 0.0 - 0.4 mg/dL    Alk Phosphatase 120 39 - 130 U/L    AST 16 0 - 41 U/L    ALT 11 0 - 31 U/L    Total Protein 6.8 6.0 - 8.0 g/dL    Alb 4.1 3.2 - 5.3 g/dL   CBC   Result Value Ref Range    WBC 4.9 3.7 - 10.8 K/ul    RBC 3.60 (L) 4.00 - 5.50 M/ul    Hemoglobin 12.3 12.0 - 16.0 g/dL    Hematocrit 34.6 (L) 36.0 - 48.0 %    MCV 96.1 80 - 100 fl    MCH 34.2 (H) 27.0 - 34.0 pg    MCHC 35.5 31.0 - 36.0 g/dl    RDW 12.3 10.8 - 14.8 %    Platelets 509 (L) 147 - 450 K/ul    Absolute Neut # 3.6 1.3 - 9.1 K/ul    Neutrophils % 73.1 %    Absolute Lymph # 0.8 0.8 - 5.2 K/ul    Lymphocyte % 15.6 %    Absolute Mono # 0.4 0.1 - 0.9 K/ul    Monocytes 7.9 %    Absolute Eos # 0.1 0.1 - 0.4 K/ul    Eosinophils % 2.6 %    Absolute Baso # 0.0 0.0 - 0.1 K/ul    Basophils % 0.6 %   Phenytoin level, total   Result Value Ref Range    Dilantin Free/Total 13.9 10 - 20 UG/ML    DOSE TIME 0830 05/15/2019    Vitamin B12   Result Value Ref Range    Vitamin B-12 128 (L) 180 - 914 pg/mL   Vitamin B6   Result Value Ref Range    Vitamin B6, Plasma 7 (L) 20 - 125 nmol/L               Assessment:     Diagnosis Orders   1. Partial seizure ([address]          She is here for follow up for seizure. She is doing well. No reported seizure. She is currently on Dilantin. She was found to have low vitamin B12 and B6 levels and is now taking supplements. She is dealing with the recent loss of her . After detailed discussion with patient we agreed on the following plan. Plan:  1. Continue with Dilantin at current dose   2. Follow up with your PCP for vitamin B12 injections   3.  Continue with Vitamin B12 sublingual supplements at least 3000 mcg daily  4. Continue with Vitamin B6 supplements daily  5. Follow up in 8 months or sooner if needed. 6. Call if any questions or concerns. Please call if any questions.      Mohsen Castro CNP

## 2019-05-24 NOTE — PATIENT INSTRUCTIONS
1. Continue with Dilantin at current dose   2. Follow up with your PCP for vitamin B12 injections   3. Continue with Vitamin B12 sublingual supplements at least 3000 mcg daily  4. Continue with Vitamin B6 supplements daily  5. Follow up in 8 months or sooner if needed. 6. Call if any questions or concerns.

## 2019-07-16 RX ORDER — RIVAROXABAN 20 MG/1
TABLET, FILM COATED ORAL
Qty: 30 TABLET | Refills: 4 | OUTPATIENT
Start: 2019-07-16

## 2019-07-24 LAB — VITAMIN B-12: >1500 PG/ML (ref 180–914)

## 2019-07-27 LAB — VITAMIN B6: >400 NMOL/L (ref 20–125)

## 2019-07-29 ENCOUNTER — TELEPHONE (OUTPATIENT)
Dept: NEUROLOGY | Age: 84
End: 2019-07-29

## 2019-09-05 ENCOUNTER — TELEPHONE (OUTPATIENT)
Dept: FAMILY MEDICINE CLINIC | Age: 84
End: 2019-09-05

## 2019-09-05 NOTE — TELEPHONE ENCOUNTER
Pt informed. States that her legs are weak and she doesn't have a way to get her. She wants to wait until she is stronger to make an appt.

## 2019-09-12 ENCOUNTER — TELEPHONE (OUTPATIENT)
Dept: FAMILY MEDICINE CLINIC | Age: 84
End: 2019-09-12

## 2019-09-12 NOTE — TELEPHONE ENCOUNTER
It would still be good to get the new version of the Shingles shot even though she had the original version of it.

## 2020-01-20 RX ORDER — PHENYTOIN SODIUM 100 MG/1
CAPSULE, EXTENDED RELEASE ORAL
Qty: 250 CAPSULE | Refills: 1 | Status: SHIPPED | OUTPATIENT
Start: 2020-01-20 | End: 2020-07-14 | Stop reason: SDUPTHER

## 2020-07-14 RX ORDER — PHENYTOIN SODIUM 100 MG/1
CAPSULE, EXTENDED RELEASE ORAL
Qty: 250 CAPSULE | Refills: 1 | Status: SHIPPED | OUTPATIENT
Start: 2020-07-14 | End: 2021-02-15 | Stop reason: SDUPTHER

## 2020-07-15 ENCOUNTER — TELEPHONE (OUTPATIENT)
Dept: FAMILY MEDICINE CLINIC | Age: 85
End: 2020-07-15

## 2020-07-15 NOTE — TELEPHONE ENCOUNTER
Pt called asking for a prescription for a walker. Linda Rissa that she had gotten a Rx for a walker back in 2018 and she never got it. She says she just needs the prescription for a walker. She did not want to make an appointment.

## 2020-10-20 ENCOUNTER — TELEPHONE (OUTPATIENT)
Dept: FAMILY MEDICINE CLINIC | Age: 85
End: 2020-10-20

## 2020-10-20 NOTE — TELEPHONE ENCOUNTER
Pt called said that Dr Samule Apley had ordered PT and it just ended. Said she fell yesterday, didn't injure herself but her legs are very weak. She is wanting you or Dr. Samule Apley to order some more PT. I did advise her to call Dr. Price Mendiola office also.     Pt can be reached at [phone]

## 2021-02-15 ENCOUNTER — TELEPHONE (OUTPATIENT)
Dept: NEUROLOGY | Age: 86
End: 2021-02-15

## 2021-02-15 DIAGNOSIS — R56.9 PARTIAL SEIZURE (HCC): Primary | ICD-10-CM

## 2021-02-15 RX ORDER — PHENYTOIN SODIUM 100 MG/1
CAPSULE, EXTENDED RELEASE ORAL
Qty: 250 CAPSULE | Refills: 1 | Status: SHIPPED | OUTPATIENT
Start: 2021-02-15 | End: 2021-08-24

## 2021-02-15 NOTE — TELEPHONE ENCOUNTER
Patient called stating she has multiple areas of bruises on her arms for the 1-2 months. She feels they from the Dilantin. She has had this happen in the past. She is taking Dilantin 300 mg on even days and 200 mg on odd days. She does bump her arms on things that can cause the bruises. She wanted to make you aware. Thank you.

## 2021-02-15 NOTE — TELEPHONE ENCOUNTER
Bruising is not typically from the Dilantin. Could she please run it by her family Dr to best sort this out for her.    Carlie Mcqueen MD

## 2021-06-03 ENCOUNTER — TELEPHONE (OUTPATIENT)
Dept: FAMILY MEDICINE CLINIC | Age: 86
End: 2021-06-03

## 2021-06-03 NOTE — TELEPHONE ENCOUNTER
I returned her call and she wanted to know if I felt she could take the Cipro that Dr Cuevas Pert Rx. He wants her to use it around the time of her basal cell cancer removal from her arm . She was concerned about several possible side effects. I told her that I thought it would be ok to take it but she would like to wait till the PT for her back is finished.

## 2021-06-04 NOTE — TELEPHONE ENCOUNTER
Patient called back stating that she is not going to have the surgery on Wednesday. She is having it with Dr Qiana Conklin on 6/29 at 10:00am.    Also wanted to thank Dr Anneliese Rothman for calling her back.

## 2021-06-09 ENCOUNTER — TELEPHONE (OUTPATIENT)
Dept: FAMILY MEDICINE CLINIC | Age: 86
End: 2021-06-09

## 2021-06-09 NOTE — TELEPHONE ENCOUNTER
Pt called with diarrhea since last night around 6 pm.    Pt stated she has no ride into the office today. Pt informed to drink water,and try the BRAT diet along with Imodium after checking with pharmacist.    Pt is to call us back if still no better tomorrow. Informed if no better we would need to see her.

## 2021-06-11 ENCOUNTER — TELEPHONE (OUTPATIENT)
Dept: FAMILY MEDICINE CLINIC | Age: 86
End: 2021-06-11

## 2021-06-11 NOTE — TELEPHONE ENCOUNTER
Patient had diarrhea on Wednesday. Took imodium, and seemed to help. Now patient feels like she still has to go. Patient thinks could be a hemorroid. Instructed to increase water and fiber. Patient said she will call back if not feeling any better.

## 2021-06-28 ENCOUNTER — OFFICE VISIT (OUTPATIENT)
Dept: NEUROLOGY | Age: 86
End: 2021-06-28
Payer: MEDICARE

## 2021-06-28 VITALS
HEART RATE: 65 BPM | HEIGHT: 66 IN | DIASTOLIC BLOOD PRESSURE: 62 MMHG | BODY MASS INDEX: 20.25 KG/M2 | SYSTOLIC BLOOD PRESSURE: 132 MMHG | WEIGHT: 126 LBS | OXYGEN SATURATION: 98 %

## 2021-06-28 DIAGNOSIS — G40.909 SEIZURE DISORDER (HCC): Primary | ICD-10-CM

## 2021-06-28 PROCEDURE — 1090F PRES/ABSN URINE INCON ASSESS: CPT | Performed by: PSYCHIATRY & NEUROLOGY

## 2021-06-28 PROCEDURE — G8427 DOCREV CUR MEDS BY ELIG CLIN: HCPCS | Performed by: PSYCHIATRY & NEUROLOGY

## 2021-06-28 PROCEDURE — 1123F ACP DISCUSS/DSCN MKR DOCD: CPT | Performed by: PSYCHIATRY & NEUROLOGY

## 2021-06-28 PROCEDURE — 4040F PNEUMOC VAC/ADMIN/RCVD: CPT | Performed by: PSYCHIATRY & NEUROLOGY

## 2021-06-28 PROCEDURE — G8420 CALC BMI NORM PARAMETERS: HCPCS | Performed by: PSYCHIATRY & NEUROLOGY

## 2021-06-28 PROCEDURE — 1036F TOBACCO NON-USER: CPT | Performed by: PSYCHIATRY & NEUROLOGY

## 2021-06-28 PROCEDURE — 99213 OFFICE O/P EST LOW 20 MIN: CPT | Performed by: PSYCHIATRY & NEUROLOGY

## 2021-06-28 NOTE — PROGRESS NOTES
NEUROLOGY OUT PATIENT FOLLOW UP NOTE:  6/28/20213:35 PM    Nallely Castellanos is here for follow up for   Patient Active Problem List   Diagnosis    Partial seizure ([address]    Irritable bowel syndrome    Drug-induced polyneuropathy ([address]    Acute blood loss as cause of postoperative anemia    Status post total replacement of right hip    Irritant contact dermatitis due to other agents    Right leg DVT ([address]   Follow-up for seizure disorder. The patient reports she is doing well she denies any episodes of altered awareness confusion or seizure. She is taking Dilantin consistently no side effects reported. She is using a walker, she has neuropathy. She denies any recent falls. She comes in with a friend. ROS:  Respiratory : no cough, no shortness of breath  Cardiac: no chest pain. No palpitations. Renal : no flank pain, no hematuria    Skin: no rash      Allergies   Allergen Reactions    Cefuroxime Axetil      Diarrhea  Has had keflex and amoxil before    Sulfa Antibiotics        Current Outpatient Medications:     Cyanocobalamin (VITAMIN B-12 PO), Take by mouth, Disp: , Rfl:     DILANTIN 100 MG ER capsule, TAKE 3 CAPSULES BY MOUTH EVERY DAY ALTERNATING WITH 2 CAPS EVERY DAY, Disp: 250 capsule, Rfl: 1    acetaminophen (TYLENOL) 325 MG tablet, Take 2 tablets by mouth every 4 hours (while awake), Disp: 120 tablet, Rfl: 3    ferrous sulfate 325 (65 Fe) MG tablet, Take 1 tablet by mouth 2 times daily (with meals) (Patient not taking: Reported on 6/28/2021), Disp: 28 tablet, Rfl: 0    diclofenac sodium (VOLTAREN) 1 % GEL, Apply 4 g topically 4 times daily RIGHT knee, Disp: 5 Tube, Rfl: 0    Cholecalciferol (VITAMIN D PO), Take 2,000 mg by mouth daily. (Patient not taking: Reported on 6/28/2021), Disp: , Rfl:     I reviewed the past medical history, allergies, medications, social history and family history.        PE:   Vitals:    06/28/21 1524   BP: 132/62   Site: Left Upper Arm   Position: Sitting   Cuff Size: Medium Adult   Pulse: 65   SpO2: 98%   Weight: 126 lb (57.2 kg)   Height: 5' 6\" (1.676 m)     General Appearance:  alert and cooperative, she is wearing a mask. Skin:  Skin color, texture, turgor normal. No rashes or lesions. Gen: NAD, Language is Intact. Skin: no rash, lesion, dry to touch. warm  Head: no rash, no icterus  Neck: There is no carotid bruits. The Neck is supple. There is no neck lymphadenopathy. Neuro: CN 2-12 grossly intact with no focal deficits. Power 5/5 Throughout symmetric, Reflexes are decreased symmetric. Long tracts are decreasded. Cerebellar exam is Intact. Sensory exam is intact to light touch. Gait is guarded uses walker. Musculoskeletal:  Has no hand arthritis, no limitation of ROM in any of the four extremities. Lower extremities no edema  The abdomen is soft,  intact bowel sounds. DATA:      Results for orders placed or performed in visit on 03/05/21   Culture, Aerobic    Specimen: Arm   Result Value Ref Range    Aerobic Culture       Culture yielded light growth of mixed colony types of Staphylococcus (coagulase negative). Direct gram stain indicated rare gram positive bacilli which did not grow on culture. This could be inhibited growth due to antibiotic therapy, a fastidious organism or an anaerobic organism. If a true mixed aerobic and anaerobic infection is suspected, then broad spectrum empiric antibiotic therapy is indicated and should include coverage for anaerobic organisms. Gram Stain Result       Rare segmented neutrophils observed. Rare epithelial cells observed. Rare gram positive bacilli. Assessment:     Diagnosis Orders   1. Seizure disorder ([facility] [address]          Follow-up for seizure disorder. The patient reports no seizure. She is compliant with medications. She denies any recent falls she has been using a walker. Her neuropathy symptoms have not been changed.   She is not taking calcium and vitamin D supplements she states that she forgot about that. The patient was counseled about the importance of taking the calcium and vitamin D. She is also advised to continue on current medications and take the Dilantin consistently. She states that occasionally she misses the dosage. We will order a CBC hepatic panel and a Dilantin level for therapeutic drug monitoring. She is doing well, overall she is compliant, she uses a walker, no recent falls. After detailed discussion with patient we agreed on the following plan. Plan:  1. Dilantin level, CBC and hepatic panel. 2. Take calcium and vitamin D supplements. 3. Continue with current dosage of Dilantin. 4.  Follow up in 12 month or sooner if needed. 5. Call if any questions or concerns. Total time 22 min.     Rachael Lenz MD

## 2021-06-28 NOTE — PATIENT INSTRUCTIONS
1.  Dilantin level, CBC and hepatic panel. 2. Take calcium and vitamin D supplements. 3. Continue with current dosage of Dilantin. 4.  Follow up in 12 month or sooner if needed. 5. Call if any questions or concerns.

## 2021-06-29 LAB
ABSOLUTE BASO #: 0 X10E9/L (ref 0–0.2)
ABSOLUTE EOS #: 0.1 X10E9/L (ref 0–0.4)
ABSOLUTE LYMPH #: 0.8 X10E9/L (ref 1–3.5)
ABSOLUTE MONO #: 0.5 X10E9/L (ref 0–0.9)
ABSOLUTE NEUT #: 3.5 X10E9/L (ref 1.5–6.6)
ALBUMIN SERPL-MCNC: 4.1 G/DL (ref 3.2–5.3)
ALK PHOSPHATASE: 122 U/L (ref 39–130)
ALT SERPL-CCNC: 10 U/L (ref 0–31)
ANION GAP SERPL CALCULATED.3IONS-SCNC: 8 MMOL/L (ref 5–15)
AST SERPL-CCNC: 11 U/L (ref 0–41)
BASOPHILS RELATIVE PERCENT: 0.7 %
BILIRUB SERPL-MCNC: 0.3 MG/DL (ref 0.3–1.2)
BUN BLDV-MCNC: 27 MG/DL (ref 5–27)
CALCIUM SERPL-MCNC: 9.1 MG/DL (ref 8.5–10.5)
CHLORIDE BLD-SCNC: 107 MMOL/L (ref 98–109)
CO2: 27 MMOL/L (ref 22–32)
CREAT SERPL-MCNC: 1.03 MG/DL (ref 0.4–1)
DILANTIN FREE/TOTAL: 7.4 UG/ML (ref 10–20)
DOSE TIME: ABNORMAL
EGFR AFRICAN AMERICAN: >60 ML/MIN/1.73SQ.M
EGFR IF NONAFRICAN AMERICAN: 51 ML/MIN/1.73SQ.M
EOSINOPHILS RELATIVE PERCENT: 2 %
GLUCOSE: 101 MG/DL (ref 65–99)
HCT VFR BLD CALC: 39.1 % (ref 35–47)
HEMOGLOBIN: 13.1 G/DL (ref 11.7–15.5)
LYMPHOCYTE %: 16.6 %
MCH RBC QN AUTO: 34.5 PG (ref 27–34)
MCHC RBC AUTO-ENTMCNC: 33.6 G/DL (ref 32–36)
MCV RBC AUTO: 103 FL (ref 80–100)
MONOCYTES # BLD: 9.6 %
NEUTROPHILS RELATIVE PERCENT: 71.1 %
PDW BLD-RTO: 14.5 % (ref 11.5–15)
PLATELETS: 150 X10E9/L (ref 150–450)
PMV BLD AUTO: 8.4 FL (ref 7–12)
POTASSIUM SERPL-SCNC: 4.9 MMOL/L (ref 3.5–5)
RBC: 3.81 X10E12/L (ref 3.8–5.2)
SODIUM BLD-SCNC: 142 MMOL/L (ref 134–146)
TOTAL PROTEIN: 6.8 G/DL (ref 6–8)
WBC: 4.9 X10E9/L (ref 4–11)

## 2021-08-10 ENCOUNTER — TELEPHONE (OUTPATIENT)
Dept: NEUROLOGY | Age: 86
End: 2021-08-10

## 2021-08-10 DIAGNOSIS — G40.909 SEIZURE DISORDER (HCC): Primary | ICD-10-CM

## 2021-08-10 NOTE — TELEPHONE ENCOUNTER
Patient called stating she is having \"shakiness\" in her feet and legs for the past 2-3 months. She notices the shakiness in her feet and legs when sitting. No shakiness when ambulating. No falls. She denies any seizures. She is taking Dilantin 300 mg alternating with 200 mg daily. Please advise. Thank you.

## 2021-08-20 ENCOUNTER — TELEPHONE (OUTPATIENT)
Dept: NEUROLOGY | Age: 86
End: 2021-08-20

## 2021-08-20 ENCOUNTER — TELEPHONE (OUTPATIENT)
Dept: FAMILY MEDICINE CLINIC | Age: 86
End: 2021-08-20

## 2021-08-20 NOTE — TELEPHONE ENCOUNTER
Patient called back stating she continues to have heaviness in her legs, from her knees down. Her feet continue to shake when sitting/lying down. She does not notice the heaviness or shakiness when ambulating. Her Dilantin level on 8/17/21 was 14. 0. please advise. Thank you.

## 2021-08-20 NOTE — TELEPHONE ENCOUNTER
Called pt and she states that the therapist comes in Monday. She would like to wait till after she comes. I informed her that we do screen the pts before they come in and aren't seeing any sick pts without their vaccines. She said she would call on Monday if she wasn't feeling any better.

## 2021-08-20 NOTE — TELEPHONE ENCOUNTER
Patient is currently receiving physical therapy. Spoke with Dr Makeda Figueroa who stated have patient follow up with PCP. Patient notified and verbalized understanding.

## 2021-08-20 NOTE — TELEPHONE ENCOUNTER
Pt states that Dr. Alexi Galvan office instructed pt to call our office. Pt states that her legs are tight feeling and very shaky from the knees down. She is using a walker and is walking more but doesn't know if using them more often is the cause of her problems. She does have a therapist coming into the home. She stated that she is on a lumigan eye drop and dilantin. She stated that it is not painful. She does not want to come into the office because of covid and does not have any way to do a virtual appt. Please advise.

## 2021-08-20 NOTE — TELEPHONE ENCOUNTER
I just have no way to know about her symptoms from the description. Remind her how we screen patients for covid before their appointments so she would not need to worry about coming in.

## 2021-08-24 DIAGNOSIS — R56.9 PARTIAL SEIZURE (HCC): Primary | ICD-10-CM

## 2021-08-24 RX ORDER — PHENYTOIN SODIUM 100 MG/1
CAPSULE, EXTENDED RELEASE ORAL
Qty: 250 CAPSULE | Refills: 1 | Status: SHIPPED | OUTPATIENT
Start: 2021-08-24 | End: 2022-02-23

## 2021-10-19 ENCOUNTER — TELEPHONE (OUTPATIENT)
Dept: FAMILY MEDICINE CLINIC | Age: 86
End: 2021-10-19

## 2021-10-19 NOTE — TELEPHONE ENCOUNTER
Patient called stating that Dr Tera Sherwood had ordered PT through [facility] and they said that her therapy is done. She would like Dr Seema Staton to order the Home PT now because Dr Tera Sherwood will not. Advised her we would need to see her in the office and she says she cannot come in. She's all alone since her  . She asked if Dr Seema Staton could do a home visit, \"it's on his way home\". She says her legs are very weak and she needs someone to work on them.     Please call Varun Jackson [phone]

## 2021-10-20 NOTE — TELEPHONE ENCOUNTER
Looking at her chart she saw Dr Brynn Oconnor at [facility] 3/16/21, Dr Ayaan Roldan 6/28/21, and she had a dilantin blood test done 8/17/21. Since she was able to do these things I would like to see her in the office.

## 2021-10-20 NOTE — TELEPHONE ENCOUNTER
Pt called req this addressed today if possible. Pt has no ride or family in [place] to help her and pt has no access to a smart phone for a video appt.

## 2021-10-21 NOTE — TELEPHONE ENCOUNTER
Spoke with Namrata Munoz and Namrata Munoz from [facility] and they advised Veverly Meline she needed to come into the office for a face to face with Dr Amanda Swanson for a complete physical.  I asked them to fax some of the notes from her therapy visits for review.

## 2021-10-21 NOTE — TELEPHONE ENCOUNTER
Patient again advised we could not order anymore therapy until she is seen here in the office. Multiple times she said she cannot come in because she has no one to bring her. Suggested [facility] and she declined. She said she is very disappointed in Dr Oleary Son and wanted me to tell him this immediately. Advised her I could not interrupt him when he was with a patient and he would review his messages later this afternoon. Patient did say she would pay for the therapy herself. She did ask that Dr Mamta Son call her.

## 2021-10-21 NOTE — TELEPHONE ENCOUNTER
Patient called stating that she does not want to come into the office because of the pandemic. She is afraid of the germs because she lives by herself now since her  passed away. She is very very upset. She says she can't come in because her legs are so weak. Offered a wheelchair and assured her that we are taking precautions for Covid with masks, etc.    She is still VERY VERY UPSET and doesn't understand why Dr Ale Dang couldn't meet her in the lobby of the [facility].

## 2021-10-21 NOTE — TELEPHONE ENCOUNTER
Message with [facility] for the Therapist to call back. Nelda Rodriguez is out of the office today but they will give her the message to call here tomorrow.

## 2021-10-22 ENCOUNTER — OFFICE VISIT (OUTPATIENT)
Dept: FAMILY MEDICINE CLINIC | Age: 86
End: 2021-10-22

## 2021-10-22 VITALS
RESPIRATION RATE: 16 BRPM | TEMPERATURE: 95.8 F | SYSTOLIC BLOOD PRESSURE: 132 MMHG | HEART RATE: 78 BPM | DIASTOLIC BLOOD PRESSURE: 64 MMHG | BODY MASS INDEX: 21.29 KG/M2 | HEIGHT: 66 IN | WEIGHT: 132.5 LBS

## 2021-10-22 DIAGNOSIS — H61.21 RIGHT EAR IMPACTED CERUMEN: ICD-10-CM

## 2021-10-22 DIAGNOSIS — M48.062 SPINAL STENOSIS OF LUMBAR REGION WITH NEUROGENIC CLAUDICATION: Primary | ICD-10-CM

## 2021-10-22 PROCEDURE — 1036F TOBACCO NON-USER: CPT | Performed by: FAMILY MEDICINE

## 2021-10-22 PROCEDURE — 1123F ACP DISCUSS/DSCN MKR DOCD: CPT | Performed by: FAMILY MEDICINE

## 2021-10-22 PROCEDURE — 1090F PRES/ABSN URINE INCON ASSESS: CPT | Performed by: FAMILY MEDICINE

## 2021-10-22 PROCEDURE — G8427 DOCREV CUR MEDS BY ELIG CLIN: HCPCS | Performed by: FAMILY MEDICINE

## 2021-10-22 PROCEDURE — 99213 OFFICE O/P EST LOW 20 MIN: CPT | Performed by: FAMILY MEDICINE

## 2021-10-22 PROCEDURE — 69210 REMOVE IMPACTED EAR WAX UNI: CPT | Performed by: FAMILY MEDICINE

## 2021-10-22 PROCEDURE — 4040F PNEUMOC VAC/ADMIN/RCVD: CPT | Performed by: FAMILY MEDICINE

## 2021-10-22 PROCEDURE — G8420 CALC BMI NORM PARAMETERS: HCPCS | Performed by: FAMILY MEDICINE

## 2021-10-22 RX ORDER — BIMATOPROST 0.01 %
DROPS OPHTHALMIC (EYE)
COMMUNITY
Start: 2021-10-05

## 2021-10-22 SDOH — ECONOMIC STABILITY: FOOD INSECURITY: WITHIN THE PAST 12 MONTHS, YOU WORRIED THAT YOUR FOOD WOULD RUN OUT BEFORE YOU GOT MONEY TO BUY MORE.: NEVER TRUE

## 2021-10-22 SDOH — ECONOMIC STABILITY: FOOD INSECURITY: WITHIN THE PAST 12 MONTHS, THE FOOD YOU BOUGHT JUST DIDN'T LAST AND YOU DIDN'T HAVE MONEY TO GET MORE.: NEVER TRUE

## 2021-10-22 ASSESSMENT — PATIENT HEALTH QUESTIONNAIRE - PHQ9
SUM OF ALL RESPONSES TO PHQ QUESTIONS 1-9: 0
1. LITTLE INTEREST OR PLEASURE IN DOING THINGS: 0
SUM OF ALL RESPONSES TO PHQ QUESTIONS 1-9: 0
2. FEELING DOWN, DEPRESSED OR HOPELESS: 0
SUM OF ALL RESPONSES TO PHQ9 QUESTIONS 1 & 2: 0
SUM OF ALL RESPONSES TO PHQ QUESTIONS 1-9: 0

## 2021-10-22 ASSESSMENT — ENCOUNTER SYMPTOMS
SINUS PRESSURE: 0
CONSTIPATION: 0
BACK PAIN: 1
SHORTNESS OF BREATH: 0

## 2021-10-22 ASSESSMENT — SOCIAL DETERMINANTS OF HEALTH (SDOH): HOW HARD IS IT FOR YOU TO PAY FOR THE VERY BASICS LIKE FOOD, HOUSING, MEDICAL CARE, AND HEATING?: NOT HARD AT ALL

## 2021-10-22 NOTE — PROGRESS NOTES
Cas Dillard (:  1934) is a 80 y.o. female,Established patient, here for evaluation of the following chief complaint(s): Other (pt states she needs new therapy orders) and Ear Fullness (right)         ASSESSMENT/PLAN:   Diagnosis Orders   1. Spinal stenosis of lumbar region with neurogenic claudication  [facility] Physical [facility]'s    69749 - [place] REMOVE IMPACTED EAR WAX   2. Right ear impacted cerumen  70388 - [place] REMOVE IMPACTED  [address] and Throat     Let me knjow when you want the wheelchair  See the [facility] ENT for removal of the remainder of the wax  See me in a year    Subjective   SUBJECTIVE/OBJECTIVE:  HPI  1. She had gotten PT from Dr Brynn Oconnor for her back. The therapy ran out 2 weeks ago and she has been worse. 2. She states it bothers both legs and there is a sense of numbness from the knees down. 3. We discussed the letter from Dr Brynn Oconnor mentioning lumbar spinal stenosis  4. Her right ear feels plugged and doesn't feel as if she has a cold  Review of Systems   Constitutional: Positive for fatigue. HENT: Positive for ear pain. Negative for sinus pressure. Eyes: Negative for visual disturbance. Respiratory: Negative for shortness of breath. Cardiovascular: Negative for chest pain. Gastrointestinal: Negative for constipation. Genitourinary: Negative. Musculoskeletal: Positive for arthralgias, back pain and joint swelling. Skin: Negative for rash. Neurological: Positive for weakness. Negative for headaches. The patient's medications, allergies, past medical problems, surgical, social, and family histories were reviewed and updated as needed. Objective   Physical Exam  Constitutional:       General: She is not in acute distress. Appearance: She is well-developed. HENT:      Head: Normocephalic and atraumatic. Right Ear: There is impacted cerumen.       Ears:      Comments: Wax was removed from the affected ear(s) with a combination of the nurse using the water bottle, and myself using the curette. Inspection of the canal, and the tympanic membrane by myself afterward found no sign of injury. There was some residual we could not remove with irrigation and not reach with the currette. Eyes:      General: No scleral icterus. Conjunctiva/sclera: Conjunctivae normal.   Neck:      Trachea: No tracheal deviation. Cardiovascular:      Rate and Rhythm: Normal rate and regular rhythm. Heart sounds: Normal heart sounds. Pulmonary:      Effort: Pulmonary effort is normal.      Breath sounds: Normal breath sounds. Skin:     General: Skin is warm and dry. Neurological:      Mental Status: She is alert and oriented to person, place, and time. Comments: Negative SLR, pedal push and pull 5/5, non tender over the lumbar spinous processes   Psychiatric:         Behavior: Behavior normal.     Blood pressure 132/64, pulse 78, temperature 95.8 °F (35.4 °C), temperature source Skin, resp. rate 16, height 5' 6\" (1.676 m), weight 132 lb 8 oz (60.1 kg), not currently breastfeeding. Luis Taylor was evaluated today and a DME order was entered for a standard wheelchair because she requires this to successfully complete daily living tasks of ambulating. A standard manual wheelchair is necessary due to patient's impaired ambulation and mobility restrictions and would be unable to resolve these daily living tasks using a cane or walker. The patient is capable of using a standard wheelchair safely in their home and can maneuver within their home with adequate access. There is a caregiver available to provide necessary assistance. The need for this equipment was discussed with the patient and she understands, is in agreement, and has not expressed an unwillingness to use the wheelchair.        I asked her numerous times in the presence of Kirill Rivasing her friend to use our wheelchair to get to the car and she refused           An electronic signature was used to authenticate this note.     --Nicole Desouza MD

## 2021-10-25 ENCOUNTER — TELEPHONE (OUTPATIENT)
Dept: FAMILY MEDICINE CLINIC | Age: 86
End: 2021-10-25

## 2021-10-25 NOTE — TELEPHONE ENCOUNTER
Pt called stating that she is very upset. She is wanting this to be addressed TODAY. She stated that since it was not addressed she is not able to see the therapist until Thursday. She stated that she was already for the therapist to come today and since it was not signed today she is not able to see her. She is wanting this addressed ASAP. Please call Paradise Romano as soon as addressed.     Paradise Romano may be reached at [phone]

## 2021-10-25 NOTE — TELEPHONE ENCOUNTER
Pt called stating that she is very upset. She is wanting this to be addressed TODAY. She stated that since it was not addressed she is not able to see the therapist until Thursday. She stated that she was already for the therapist to come today and since it was not signed today she is not able to see her. She is wanting this addressed ASAP. Please call Dhruv Haro as soon as addressed.     Dhruv Haro may be reached at [phone]

## 2021-10-25 NOTE — TELEPHONE ENCOUNTER
Received called from [facility]- they can not use the order for [facility]. They are requesting Home Health orders placed with Physical Therapy so they can go out and access patient.

## 2021-10-25 NOTE — TELEPHONE ENCOUNTER
Pt called asking that dr change the order for PT from OP to home visit. [address]. Wants the therapist to come today yet.   Said that the therapist doesn't work again until Thursday

## 2021-10-26 DIAGNOSIS — M48.062 SPINAL STENOSIS OF LUMBAR REGION WITH NEUROGENIC CLAUDICATION: Primary | ICD-10-CM

## 2021-10-28 ENCOUNTER — TELEPHONE (OUTPATIENT)
Dept: FAMILY MEDICINE CLINIC | Age: 86
End: 2021-10-28

## 2021-10-28 NOTE — TELEPHONE ENCOUNTER
Rodo Aggarwal from [facility] called wanting to know if Dr Arnaldo Ahuja would consider prescribing something for Estelita's anxiety. She also asked about a home health  evaluation. Advised her to go forward with it.

## 2022-02-23 DIAGNOSIS — R56.9 PARTIAL SEIZURE (HCC): Primary | ICD-10-CM

## 2022-02-23 RX ORDER — PHENYTOIN SODIUM 100 MG/1
CAPSULE, EXTENDED RELEASE ORAL
Qty: 250 CAPSULE | Refills: 1 | Status: SHIPPED | OUTPATIENT
Start: 2022-02-23 | End: 2022-08-25 | Stop reason: SDUPTHER

## 2022-04-18 NOTE — PROGRESS NOTES
Physical Therapy  Visit Type: treatment  Precautions:  Medical precautions:  fall risk; standard precautions.  89 year old female sustained an acute nondisplaced mildly comminuted fracture of the right iliac wing and a Nondisplaced fracture at the distal fourth phalanx. No dislocation.      Non-operative treatment is recommended at this time. WBAT RLE   Patient now s/p ex lap with repair of serosal tear. Has abdominal binder in place.    Therapist wore mask, gloves and eye protection. 2 visitors in room with masks in place. Patient did not wear mask in patient room.     Lines:     Basic: NG, indwelling urinary catheter and IV (BLE pumps)      Lines in chart and on patient reviewed, precautions maintained throughout session.  Upper Extremity:    Splint to finger of RUE  Lower Extremity:    Right:  weight bearing: as tolerated.  Safety Measures: bed alarm, chair alarm and bed rails    SUBJECTIVE  Patient agreed to participate in therapy this date.  During session, patient voiced that she would like to be hospice. MD present. Daughter present. Daughter's friend present.    Prior Living Situation  Information Provided By:: patient  Type of Home:  ([facility])  # Steps to Enter: 0  # Steps in the Home: 0  Bathroom Shower/Tub: Walk-in shower  Bathroom Toilet: Raised  Bathroom Equipment: Shower chair, Grab bars in shower  Home Equipment: Two-wheeled walker    Prior Communication/Cognition  Prior Cognition: Intact    Prior Function  Prior ADL: Independent  Eating: Independent  Grooming: Independent  Bathing: Independent  Upper Body Dressing: Independent  Lower Body Dressing: Independent  Toileting: Independent  Bed Mobility: Independent  Transfers: Independent  Toilet Transfers: Independent  Tub/Shower Transfers: Independent  Ambulation in the Home: Independent  Ambulation in the Community: Independent  Transfer Equipment: Two wheeled walker  Locomotion Equipment: Two wheeled walker  History of Falls in  Pt will benefit fromc ont PT    Occupational therapy: FIMS:  Eatin - Patient feeds self  Groomin - Able to perform 3-4 tasks with touching help (SBA-CGA to complete oral care and comb and wash and dry hands)  Bathin - Able to bathe 8-9 areas (CGA while standing and assist to wash below B knees )  Dressing-Upper: 5 - Requires setup/supervision/cues and/or requires assist with presthesis/brace only  Dressing-Lower: 3 - Requires assist with 2-3 parts of dressing (Min A to don shorts using reacher, assist to don socks)  Toiletin - Requires setup/supervision/cues (close SBA while managing clothing)  Toilet Transfer: 4 - Requires steadying assistance only < 25% assist,  , Assessment: Pt. has not met any goals due to short stay on rehab unit. Pt. requires encouragement and motivation to complete activities. Pt. is completing transfers with min A-CGA with cues for technique, she reqires min cues for hip precautions. MIRELES completed education on LHAE and pt. completed with cues and supervision. Pt. is walking short distances and becomes shakey and nervous at times.  Pt. will benefit from continued OT services to increase indepence, strength and endurance for ADLs/IADLs    Speech therapy: FIMS: Comprehension: 6 - Complex ideas 90% or device (hearing aid/glasses)  Expression: 6 - Device used to express complex ideas/needs  Social Interaction: 6 - Patient requires medication for mood and/or effect  Problem Solvin - Patient solves simple/routine tasks 75-90%+   Memory: 4 - Patient remembers 75-90%+ of the time    Review of Systems:  CONSTITUTIONAL:  positive for fatigue  EYES:  negative  HEENT:  negative  RESPIRATORY:  negative  CARDIOVASCULAR:  negative  GASTROINTESTINAL:  negative  GENITOURINARY:  negative  SKIN:  negative  HEMATOLOGIC/LYMPHATIC:  negative  MUSCULOSKELETAL:  positive for  myalgias, arthralgias, pain, joint swelling, stiff joints, decreased range of motion, muscle weakness and bone past year: Yes  Any fall with injury?: Yes  Hearing: No hearing deficits  Vision: Wears glasses only for reading (macular degeneration)    Patient / Family Goal: return to previous functional status    Pain     Location: finger tolerable, abdomen unrated, R hip unrated  RN informed on pain level     OBJECTIVE   Level of consciousness: alert and drowsy (varied;did not ask orientation questions)    Oriented to person     Arousal alertness: appropriate responses to stimuli    Affect/Behavior: cooperative  Functional Communication/Cognition    Overall status:  Within functional limits    Form of communication:  Verbal and nods/gestures appropriately   Attention span:  Appears intact    Commands: follows all commands and directions consistently.    Transition between tasks: transitions tasks without difficulty.    Safety judgement: good awareness of safety precautions.    Awareness of deficits: fully aware of deficits.  Strength (out of 5 unless otherwise indicated)   WFL: LLE, RLE  Balance (trials measured in sec unless otherwise indicted)    Sitting: Static: supervision back unsupported and double lower extremity support    Standing - Firm Surface - Eyes Open: Static: contact guard/touching/steadying assist double upper extremity support (RW)  Balance Training Completed    Training Tasks: static sitting and static standing    Using: eyes open and even surfaces    Education Details: patient safety and patient demonstrates understanding    Bed Mobility:    Rolling left: with tactile cues, with verbal cues and minimal assist    Rolling right: minimal assist, with tactile cues and with verbal cues      Sit to side-lying: with tactile cues, with verbal cues and 2 persons (2 person min)    Sit to supine: with verbal cues, with tactile cues and 2 persons (2 person min)  Training completed:    Tasks: rolling left and sit to supine    Education details: body mechanics, patient safety and patient demonstrates  understanding  Transfers:    Assistive devices: gait belt, 2-wheeled walker and 1 person    Sit to stand: with tactile cues, with verbal cues and minimal assist    Stand to sit: with verbal cues, with tactile cues and minimal assist  Training completed:    Tasks: sit to stand and stand to sit    Education details: body mechanics, patient safety and patient requires additional training  Gait/Ambulation:     Assistance: minimal assist   Assistive device: gait belt, 2-wheeled walker and 1 person    Distance (ft): 5    Pattern: within functional limits    Type: decreased marcelle, antalgic and step to    Deviation in foot placement: narrow base of support    Stance phase: Left: decreased push off and decreased heel strike; Right: decreased weight shift, decreased push off and decreased heel strike    Swing phase: Left: decreased step length; Right: decreased step length    Surface: even  Training Completed:    Tasks: gait training on level surfaces    Education details: body mechanics, patient safety and patient requires additional training      Interventions     Supine    Lower Extremity: Bilateral: AROM,   Seated    Lower Extremity: Bilateral: AROM,   Training provided: activity tolerance, balance retraining, bed mobility training, body mechanics, functional ambulation, positioning, safety training, transfer training, use of assistive device, energy conservation, gait training and HEP training    Skilled input: Verbal instruction/cues, tactile instruction/cues and posture correction  Verbal Consent: Writer verbally educated and received verbal consent for hand placement, positioning of patient, and techniques to be performed today from patient for therapist position for techniques as described above and how they are pertinent to the patient's plan of care.       ASSESSMENT    Impairments: strength, activity tolerance, range of motion and balance deficits  Functional Limitations: all functional mobility   Patient seen  in 5008/01    RN approved session. Patient agreeable. Patient received from bedside recliner chair. Alert and varied to drowsy. PTA modified independent living in independent living using a RW. Pain: finger tolerable, abdomen unrated, RLE unrated; sit <> stand RW min assist with cueing verbally. 5 steps to bedside chair with RW min assist. Assisted into bed with min x 2. Rolled left and right to doff and meredith clean brief as patient expressed that she urinated a little when she stood up. Assisted with washing of BLE and also donned new socks as well.  Antalgic gait, decreased weight shift RLE, narrow base of support. Patient voiced that she would like to be on hospice. MD and daughter present.    Discharge Recommendations  Recommendation for Discharge: PT IL: Patient is appropriate for daily Physical Therapy  PT/OT Mobility Equipment for Discharge: has RW  PT/OT ADL Equipment for Discharge: pt has all needed AE/DME at home                       Therapy Diagnosis:  Other Abnormalities of Gait and Mobility    Skilled therapy is required to address these limitations in attempt to maximize the patient's independence.  Progress: progressing toward goals    End of Session:   Location: in bed (sat in chair for approx 2 hours this a.m.)  Safety measures: alarm system in place/re-engaged, call light within reach, equipment intact and bed rails x3  Handoff to: nurse and nurse assistant  Education Provided:   Learning assessment:  - Primary learner: patient  - Are they ready to learn: yes  - Preferred learning style: verbal  - Barriers to learning: no barriers apparent at this time  Education provided during session:  - Receiving education: patient  - Results of above outlined education: Verbalizes understanding    PLAN    Suggestions for next session as indicated: A minimum of 8 minutes per session x 1 week.    PT Frequency: 3 days/week  Frequency Comments: 4/18 MILAGRO, P3-1            Interventions: gait training, HEP  on 2/22. 2. Tylenol. 1. Will schedule q4H when awake with additional order for q4 PRN. 2. DVT PPx.  1. 21 days on Lovenox per Dr. Elisabeth Brito protocol. End 3/8  3. Wound care. 2. Falls/Gait instability. 1. PT/OT. 1. [facility] with RW. 4 6\" steps. 3. Post operative anemia. 1.  Hgb 13.4 on 2/12 and 8.8 0n 2/15/2018. 2. Hgb 2/17 was 8.0. Repeat on 2/18 was 8.5.  3. BID iron supplements started 2/17.  4. H&H tomorrow. 4. UTI with Proteus mirabilis and E. coli. 1. Monurol ordered. End 2/29. 2. Dr. Yadira Crook ordered ciprofloxacin to which the Proteus is not sensitive; but Monurol will cover. 3. Repeat UA on 2/24. 5. Nutrition:  Consultation to dietician for nutritional counseling and recommendations. TotP 5.9, alb 2.9, Vitamin 25OH level of 26 on 2/17/2018. 1. Cholecalciferol. 6. Bladder: Urine malodorous, UA/Reflex ordered 2/17. Positive for UTI. 7. Bowel: Senna, colace, MOM Last BM 2/21. 1. Senna 4 tablets. 2. Glycolax daily. 3. Colace TID. 4. Decrease once having BMs. De-escalated bowel medications on 2/20.  8. Rehabilitation nursing will be involved for bowel, bladder, skin, and pain management. Nursing will also provide education and training to patient and family. 9. Prophylaxis:  DVT: Lovenox. GI: None. 10.  and case management consultations for coordination of care and discharge planning. Estimated Length of Stay: 3/2/2018  Destination: home health  Appropriate to trial functional independence apartment stay: No   Pass: No  Services at Discharge: [address], Occupational Therapy, Nursing and an Aide 3x week  Equipment at Discharge: Hip kit    Chronic Problems:  1. RIGHT shoulder pain. 1. Pain control as above. 2. Chronic LBBB 2004. 1. Noted on EKG 2/16/2018. 3. Seizures on chronic dilantin.  Last in 1988. 1. Dose dilantin per home regimen. 2. Last Dilantin Free/Total 12.2 on 5/31/2017. 4. Drug induced polyneuropathy.   1. No train/position, endurance training, functional transfer training, safety education, strengthening, balance and body mechanics    Agreement to plan and goals: patient agrees with goals and treatment plan        GOALS  Long Term Goals: (to be met by time of discharge from hospital)  Sit to supine: Patient will complete sit to supine supervision.  Status: progressing/ongoing  Supine to sit: Patient will complete supine to sit supervision.  Status: progressing/ongoing  Sit to stand: Patient will complete sit to stand transfer with gait belt and 2-wheeled walker, supervision.   Status: progressing/ongoing  Stand to sit: Patient will complete stand to sit transfer with gait belt and 2-wheeled walker, supervision.   Status: progressing/ongoing  Stand pivot: Patient will complete stand pivot transfer with gait belt and 2-wheeled walker, supervision.   Status: progressing/ongoing  Ambulation (even): Patient will ambulate on even surface for 150 feet with gait belt and 2-wheeled walker, supervision.   Status: progressing/ongoing    Documented in the chart in the following areas: Assessment.      Therapy procedure time and total treatment time can be found documented on the Time Entry flowsheet

## 2022-04-23 ENCOUNTER — TELEPHONE (OUTPATIENT)
Dept: FAMILY MEDICINE CLINIC | Age: 87
End: 2022-04-23

## 2022-04-23 NOTE — TELEPHONE ENCOUNTER
Fall on  This past  Tuesday on 4-20-22 and ecchymosis of the  Right arm and now some tail bone pain . Full rom of shoulder and elbow and wrist  Right arm and now  Some coccyx pain .  To use heat  Now and tylenol  For pain and  colace  For stools  And see  Dr Mor Lam

## 2022-08-25 DIAGNOSIS — R56.9 PARTIAL SEIZURE (HCC): Primary | ICD-10-CM

## 2022-08-25 RX ORDER — PHENYTOIN SODIUM 100 MG/1
CAPSULE, EXTENDED RELEASE ORAL
Qty: 250 CAPSULE | Refills: 1 | Status: SHIPPED | OUTPATIENT
Start: 2022-08-25

## 2022-09-13 ENCOUNTER — TELEPHONE (OUTPATIENT)
Dept: NEUROLOGY | Age: 87
End: 2022-09-13

## 2022-09-13 DIAGNOSIS — G40.909 SEIZURE DISORDER (HCC): Primary | ICD-10-CM

## 2022-09-13 NOTE — TELEPHONE ENCOUNTER
Patient stated that she noticed bruising and she isn't sure if it could be from her medication or just old age. Nurse was unavailable at this time. Please f/u with pt.

## 2022-09-14 NOTE — TELEPHONE ENCOUNTER
Spoke with patient who stated she is having problems with her legs being weak since last night. She is afraid of falling. She is also bruising easily. She is concerned the bruising might be coming from her Dilantin. Please advise. Thank you.

## 2022-10-31 DIAGNOSIS — R56.9 PARTIAL SEIZURE (HCC): ICD-10-CM

## 2022-10-31 RX ORDER — PHENYTOIN SODIUM 100 MG/1
CAPSULE, EXTENDED RELEASE ORAL
Qty: 250 CAPSULE | Refills: 1 | OUTPATIENT
Start: 2022-10-31

## 2022-11-10 ENCOUNTER — TELEPHONE (OUTPATIENT)
Dept: FAMILY MEDICINE CLINIC | Age: 87
End: 2022-11-10

## 2022-11-10 NOTE — TELEPHONE ENCOUNTER
Pt called stating that she had to cancel an appointment with Dr. Maria Victoria Ahumada for the injections in her knees due to not being able to walk due to the arthritis in her legs and feet. She said that they injections were helping but she just is not able to get out and go to the doctors. She said that Dr. Gretel Fernando office informed her that she has to see her family doctor since she has canceled so many appointments with them without being seen by her family doctor. They told her that they need the ok from her family doctor to continue the injections. She said she has no reason to be seen by her PCP and is not able to come in to be seen. She is asking if she would be able to get the ok to continue to get the injections. She was informed that it has been over a year since she was seen here so she would need to be seen to get any type of referral or order. She said again that she is not able to make it in for an appointment and asked for the ok to get the injections.     Jenice Olszewski may be reached at [phone]

## 2022-11-11 ENCOUNTER — TELEPHONE (OUTPATIENT)
Dept: FAMILY MEDICINE CLINIC | Age: 87
End: 2022-11-11

## 2022-11-11 DIAGNOSIS — S89.90XA KNEE INJURY, UNSPECIFIED LATERALITY, INITIAL ENCOUNTER: Primary | ICD-10-CM

## 2022-11-11 NOTE — TELEPHONE ENCOUNTER
Kacie Abbott called while we were on the answering service and stated she had injured her knee in some way. She had seen Dr Cleve Stubbs in the past and she called [facility]. They told her that she missed too many appointments and could not see her. Please call [facility] and explain that she doesn't drive and has to rely on others to get around.  She feels that she could be there today after 1 PM. I will do the referral.

## 2022-11-11 NOTE — TELEPHONE ENCOUNTER
Called [facility] and patient is discharged from there practice. And said patient cannot be seen there again.

## 2022-11-11 NOTE — TELEPHONE ENCOUNTER
Pt called stating that  office called to inform her of being discharged she is upset and want's to know what Tanika Phoenix is requesting doctor call's her

## 2022-11-11 NOTE — TELEPHONE ENCOUNTER
Patient called and let her know that [facility] discharged her. Informed via phone that patient needs to go to ed.

## 2022-11-16 ENCOUNTER — TELEPHONE (OUTPATIENT)
Dept: FAMILY MEDICINE CLINIC | Age: 87
End: 2022-11-16

## 2022-11-16 NOTE — TELEPHONE ENCOUNTER
I called [facility] and they stated they would be able to provide the pt with those services as long as she lived in [place]. Pt will need to call [facility] and get registered.

## 2022-11-16 NOTE — TELEPHONE ENCOUNTER
----- Message from Nirav Hussein MD sent at 11/16/2022  2:21 PM EST -----  I  spoke to Dr Maria Victoria Ahumada yesterday and he stated he would be happy to see her still if she could just get to [facility]. He suggested that  [facility] may be able to take her. Please check with them to see if this would be an option.

## 2022-11-16 NOTE — TELEPHONE ENCOUNTER
Dr Rudi Tariq called me concerned about how many times Didier Vides has called and cancelled or no showed for appointments. He stated he would be willing to see her if she could get to his office. He is concerned about her ability to  make decisions and if there is a competency issue. We discussed that perhaps  [facility] could transport her. I will ask my staff to contact them to see if this could be done.

## 2022-11-18 NOTE — TELEPHONE ENCOUNTER
Could someone call [facility] and let them know that Dr Vijay Palmer said he would see her if she could get there.  Shorty Jones needs to arrange her transportation and make the appointment

## 2022-12-05 ENCOUNTER — TELEPHONE (OUTPATIENT)
Dept: NEUROLOGY | Age: 87
End: 2022-12-05

## 2022-12-05 NOTE — TELEPHONE ENCOUNTER
Patient called stating her legs and feet are weak. She does not know for how long. She has history of arthritis per patient. She had a fall last week. She denies injury. She is scheduled for knee injections with [facility] on Thursday. She had appointment scheduled for today, but wanted to reschedule for next week.

## 2022-12-19 ENCOUNTER — TELEPHONE (OUTPATIENT)
Dept: NEUROLOGY | Age: 87
End: 2022-12-19

## 2022-12-19 NOTE — TELEPHONE ENCOUNTER
Patient called stating she slidd off her bed yesterday. She had things in  her hands and she slid off the bed. She thinks she landed on her knee. Her knee is sore today. She is following with [facility] for knee injections. She is asking for sooner apt with Neurology as she keeps missing her appointments due to transportation. Next apt 1/16/2023. Please advise. Thank you.

## 2023-01-09 ENCOUNTER — OFFICE VISIT (OUTPATIENT)
Dept: NEUROLOGY | Age: 88
End: 2023-01-09
Payer: MEDICARE

## 2023-01-09 ENCOUNTER — HOSPITAL ENCOUNTER (OUTPATIENT)
Dept: GENERAL RADIOLOGY | Age: 88
Discharge: HOME | End: 2023-01-09
Payer: MEDICARE

## 2023-01-09 ENCOUNTER — HOSPITAL ENCOUNTER (OUTPATIENT)
Age: 88
Discharge: HOME | End: 2023-01-09
Payer: MEDICARE

## 2023-01-09 VITALS
DIASTOLIC BLOOD PRESSURE: 60 MMHG | HEIGHT: 66 IN | RESPIRATION RATE: 10 BRPM | BODY MASS INDEX: 21.39 KG/M2 | HEART RATE: 69 BPM | OXYGEN SATURATION: 97 % | SYSTOLIC BLOOD PRESSURE: 135 MMHG

## 2023-01-09 DIAGNOSIS — R20.0 LEG NUMBNESS: ICD-10-CM

## 2023-01-09 DIAGNOSIS — M54.9 DISCOMFORT OF BACK: ICD-10-CM

## 2023-01-09 DIAGNOSIS — M54.89 BACK PAIN WITHOUT SCIATICA: ICD-10-CM

## 2023-01-09 DIAGNOSIS — G40.909 SEIZURE DISORDER (HCC): Primary | ICD-10-CM

## 2023-01-09 DIAGNOSIS — G40.909 SEIZURE DISORDER (HCC): ICD-10-CM

## 2023-01-09 LAB
ALBUMIN SERPL-MCNC: 4.3 G/DL (ref 3.5–5.1)
ALP BLD-CCNC: 133 U/L (ref 38–126)
ALT SERPL-CCNC: 9 U/L (ref 11–66)
AST SERPL-CCNC: 13 U/L (ref 5–40)
BASOPHILS # BLD: 0.4 %
BASOPHILS ABSOLUTE: 0 THOU/MM3 (ref 0–0.1)
BILIRUB SERPL-MCNC: 0.4 MG/DL (ref 0.3–1.2)
BILIRUBIN DIRECT: < 0.2 MG/DL (ref 0–0.3)
EOSINOPHIL # BLD: 0.9 %
EOSINOPHILS ABSOLUTE: 0 THOU/MM3 (ref 0–0.4)
ERYTHROCYTE [DISTWIDTH] IN BLOOD BY AUTOMATED COUNT: 13.5 % (ref 11.5–14.5)
ERYTHROCYTE [DISTWIDTH] IN BLOOD BY AUTOMATED COUNT: 54.3 FL (ref 35–45)
HCT VFR BLD CALC: 43.5 % (ref 37–47)
HEMOGLOBIN: 14.1 GM/DL (ref 12–16)
IMMATURE GRANS (ABS): 0.01 THOU/MM3 (ref 0–0.07)
IMMATURE GRANULOCYTES: 0.2 %
LYMPHOCYTES # BLD: 17.4 %
LYMPHOCYTES ABSOLUTE: 1 THOU/MM3 (ref 1–4.8)
MCH RBC QN AUTO: 35 PG (ref 26–33)
MCHC RBC AUTO-ENTMCNC: 32.4 GM/DL (ref 32.2–35.5)
MCV RBC AUTO: 107.9 FL (ref 81–99)
MONOCYTES # BLD: 8.8 %
MONOCYTES ABSOLUTE: 0.5 THOU/MM3 (ref 0.4–1.3)
NUCLEATED RED BLOOD CELLS: 0 /100 WBC
PHENYTOIN LEVEL: 27.3 UG/ML (ref 6–18)
PLATELET # BLD: 138 THOU/MM3 (ref 130–400)
PMV BLD AUTO: 9.1 FL (ref 9.4–12.4)
RBC # BLD: 4.03 MILL/MM3 (ref 4.2–5.4)
SEG NEUTROPHILS: 72.3 %
SEGMENTED NEUTROPHILS ABSOLUTE COUNT: 4 THOU/MM3 (ref 1.8–7.7)
TOTAL PROTEIN: 6.6 G/DL (ref 6.1–8)
WBC # BLD: 5.5 THOU/MM3 (ref 4.8–10.8)

## 2023-01-09 PROCEDURE — G8484 FLU IMMUNIZE NO ADMIN: HCPCS | Performed by: PSYCHIATRY & NEUROLOGY

## 2023-01-09 PROCEDURE — 36415 COLL VENOUS BLD VENIPUNCTURE: CPT

## 2023-01-09 PROCEDURE — 1090F PRES/ABSN URINE INCON ASSESS: CPT | Performed by: PSYCHIATRY & NEUROLOGY

## 2023-01-09 PROCEDURE — 80076 HEPATIC FUNCTION PANEL: CPT

## 2023-01-09 PROCEDURE — 1036F TOBACCO NON-USER: CPT | Performed by: PSYCHIATRY & NEUROLOGY

## 2023-01-09 PROCEDURE — G8427 DOCREV CUR MEDS BY ELIG CLIN: HCPCS | Performed by: PSYCHIATRY & NEUROLOGY

## 2023-01-09 PROCEDURE — 80185 ASSAY OF PHENYTOIN TOTAL: CPT

## 2023-01-09 PROCEDURE — 85025 COMPLETE CBC W/AUTO DIFF WBC: CPT

## 2023-01-09 PROCEDURE — G8420 CALC BMI NORM PARAMETERS: HCPCS | Performed by: PSYCHIATRY & NEUROLOGY

## 2023-01-09 PROCEDURE — 1123F ACP DISCUSS/DSCN MKR DOCD: CPT | Performed by: PSYCHIATRY & NEUROLOGY

## 2023-01-09 PROCEDURE — 99214 OFFICE O/P EST MOD 30 MIN: CPT | Performed by: PSYCHIATRY & NEUROLOGY

## 2023-01-09 PROCEDURE — 72100 X-RAY EXAM L-S SPINE 2/3 VWS: CPT

## 2023-01-09 NOTE — PATIENT INSTRUCTIONS
Physical therapy evaluate for gait safety. Dilantin level, CBC and hepatic panel. Take calcium and vitamin D supplements. Continue with current dosage of Dilantin. Follow up in 12 month or sooner if needed. Call if any questions or concerns.

## 2023-01-09 NOTE — RESULT ENCOUNTER NOTE
Please asked patient to stop Dilantin level is toxic at 27.3. I examined her today, she is unsteady this is the explanation of her unsteadiness. Continue staying off Dilantin until 1/11/2023, resume at 100 mg twice a day only, starting on 1/11/2023. Thank you. Repeat level in one week.    Report any seizure  Ben Srivastava MD

## 2023-01-09 NOTE — PROGRESS NOTES
NEUROLOGY OUT PATIENT FOLLOW UP NOTE:  1/9/202312:56 PM    Samir Euceda is here for follow up for   Patient Active Problem List   Diagnosis    Partial seizure ([facility] [address]    Irritable bowel syndrome    Drug-induced polyneuropathy ([facility] [address]    Acute blood loss as cause of postoperative anemia    Status post total replacement of right hip    Irritant contact dermatitis due to other agents    Right leg DVT ([address]   Follow-up for seizure disorder. The patient reports no seizure. However she has balance trouble. She is taking Dilantin consistently no side effects reported. She is using a walker, she has neuropathy. .  She comes in with a friend. Allergies   Allergen Reactions    Cefuroxime Axetil      Diarrhea  Has had keflex and amoxil before    Sulfa Antibiotics        Current Outpatient Medications:     DILANTIN 100 MG ER capsule, TAKE 3 CAPSULES BY MOUTH EVERY DAY ALTERNATING WITH 2 CAPS EVERY DAY, Disp: 250 capsule, Rfl: 1    Cyanocobalamin (VITAMIN B-12 PO), Take by mouth, Disp: , Rfl:     acetaminophen (TYLENOL) 325 MG tablet, Take 2 tablets by mouth every 4 hours (while awake), Disp: 120 tablet, Rfl: 3    Cholecalciferol (VITAMIN D PO), Take 2,000 mg by mouth daily , Disp: , Rfl:     LUMIGAN 0.01 % SOLN ophthalmic drops, , Disp: , Rfl:     ferrous sulfate 325 (65 Fe) MG tablet, Take 1 tablet by mouth 2 times daily (with meals) (Patient not taking: Reported on 1/9/2023), Disp: 28 tablet, Rfl: 0    I reviewed the past medical history, allergies, medications, social history and family history. PE:   Vitals:    01/09/23 1228   BP: 135/60   Site: Left Upper Arm   Position: Sitting   Cuff Size: Medium Adult   Pulse: 69   Resp: 10   SpO2: 97%   Height: 5' 6\" (1.676 m)     General Appearance:  alert and cooperative,   Skin:  Skin color, texture, turgor normal. No rashes or lesions. Gen: NAD, Language is Intact.  Skin: no rash, lesion, dry to touch. warm  Head: no rash, no icterus  Neck: There is no carotid bruits. The Neck is supple. There is no neck lymphadenopathy. Neuro: CN 2-12 grossly intact with no focal deficits. Power 5/5 Throughout symmetric, Reflexes are decreased symmetric. Long tracts are decreasded. Cerebellar exam is Intact. Sensory exam is intact to light touch. Gait is guarded requires assistance to get out of wheel chair, does not feel secure standing. She is hesitant with her gait and takes small steps. Musculoskeletal:  Has no hand arthritis, no limitation of ROM in any of the four extremities. Lower extremities no edema  The abdomen is soft,  intact bowel sounds. DATA:      Results for orders placed or performed in visit on 08/17/21   Phenytoin Level, Total   Result Value Ref Range    Phenytoin Lvl 14.0 6.0 - 18.0 ug/mL          Assessment:     Diagnosis Orders   1. Seizure disorder ([facility] [place] [address]        2. Discomfort of back        3. Leg numbness             Follow-up for seizure disorder. The patient reports no seizure. She is compliant with medications. She has not been seen for a while. She is worse with her ambulation. She is guarded does not trust her feet. There is no reported seizure. She is hesitant with her gait. Small steps, the numbness in the legs is due to the neuropathy with Phenytoin. She denies any seizure. She is guarded with her ambulation. She needs therapy, to help with her gait safety assesment, she has hardship with transportation, will do PT at home, per patient request. Need to check medication level. After detailed discussion with patient and her caregiver we agreed on the following plan. Plan:  Physical therapy evaluate for gait safety. Dilantin level, CBC and hepatic panel. Lumbar X ray. Take calcium and vitamin D supplements. Continue with current dosage of Dilantin. Follow up in 12 month or sooner if needed. Call if any questions or concerns. Total time 34 min.     Veleta Dance, MD

## 2023-01-10 ENCOUNTER — TELEPHONE (OUTPATIENT)
Dept: NEUROLOGY | Age: 88
End: 2023-01-10

## 2023-01-10 DIAGNOSIS — G40.909 SEIZURE DISORDER (HCC): Primary | ICD-10-CM

## 2023-01-10 NOTE — TELEPHONE ENCOUNTER
----- Message from Aimee Rust MD sent at 1/10/2023 10:16 AM EST -----  Please let patient know she needs to see spine specialist re ask patient to follow up with us re lumbar spine X ray results showing antegrade spondylolisthesis of L4 upon L5. Severe endplate depression deformities involving the superior and inferior endplates of L2 as well as the superior endplate of L3, consistent with osteoporosis   and not present on the prior study. As result, there is moderate overall compression of the L2 vertebral body  Does she have a preference?      Aimee Rust MD

## 2023-01-10 NOTE — TELEPHONE ENCOUNTER
----- Message from Riley Jacome MD sent at 1/9/2023  4:01 PM EST -----  Please asked patient to stop Dilantin level is toxic at 27.3. I examined her today, she is unsteady this is the explanation of her unsteadiness. Continue staying off Dilantin until 1/11/2023, resume at 100 mg twice a day only, starting on 1/11/2023. Thank you. Repeat level in one week.    Report any seizure  Riley Jacome MD

## 2023-01-10 NOTE — RESULT ENCOUNTER NOTE
Please let patient know she needs to see spine specialist re ask patient to follow up with us re lumbar spine X ray results showing antegrade spondylolisthesis of L4 upon L5. Severe endplate depression deformities involving the superior and inferior endplates of L2 as well as the superior endplate of L3, consistent with osteoporosis   and not present on the prior study. As result, there is moderate overall compression of the L2 vertebral body  Does she have a preference?      Christopher Darling MD [facility] DIVISION of INFECTIOUS DISEASE  Ariel Connelly MD PhD, Priscilla Carranza MD, Karen Real MD, Kyleigh Glass MD, Naga Orozco MD  and providing coverage with Pasquale Reynolds MD  Providing Infectious Disease Consultations at [facility], [facility], [place], [facility], [facility]'s    Office# [phone] to schedule follow up appointments  Answering Service for urgent calls or New Consults [phone]  Cell# to text for urgent issues Ariel Connelly [phone]     HPI:  78 yo M with PMH parkinson's, HTN, HLD, GERD, prostate ca, 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 Wife Viki at bedside providing collateral information. Pt has been doing well until around 8:30 PM last night. He was very weak and was having difficulty getting up and walking due to the weakness.  Pt normally walks with a cane but was unable to get up last night. Daughter also noted that pt was having sweats but denies any fever or chills. Pt has been eating and drinking well. Pt denies any urinary urgency, frequency, dysuria or hematuria. Denies chest pain, palpitations, SOB, cough, abdominal pain, nausea, vomiting, diarrhea, headaches, dizziness.   Denies recent travel, recent antibiotic use, or sick contacts.    ED Course:   Vitals: BP: 123/53, HR: 74, Temp: 97.9 F, RR: 16, SpO2: 94% on RA   Labs: H/H: 12.9/37.9,  BUN/Cr: 25/1.80, GFR: 38, gluc: 228, lactate: 3.1  trop neg x1, COVID negative   UA: small bilirubin, trace ketone, 30 protein, moderate LE, 11-25 WBC, 3-5 RBC, few bacteria, few calcium oxalate   CXR: no acute lung pathology as per personal read, official read pending   EKG: junctional rhythm with PVCs, HR: 69   Received 1L NS bolus x1 and Rocephin in the ED  (05 May 2022 05:43)      PAST MEDICAL & SURGICAL HISTORY:  Hypertension  Hyperlipidemia  GERD (gastroesophageal reflux disease)  Parkinsons disease  Prostate cancer  Mitral valve prolapse    Malignant neoplasm of tongue, unspecified s/p surgery and radiation discontinued   History of vasectomy  Prostate cancer s/p cyber knife  S/P partial glossectomy       Antimicrobials  cefTRIAXone   IVPB 1000 milliGRAM(s) IV Intermittent every 24 hours      Immunological      Other  acetaminophen     Tablet .. 650 milliGRAM(s) Oral every 6 hours PRN  carbidopa/levodopa  25/100 1 Tablet(s) Oral daily  carbidopa/levodopa  25/100 1 Tablet(s) Oral at bedtime  citalopram 20 milliGRAM(s) Oral daily  diltiazem    milliGRAM(s) Oral daily  heparin   Injectable 5000 Unit(s) SubCutaneous every 8 hours  melatonin 3 milliGRAM(s) Oral at bedtime PRN  ondansetron Injectable 4 milliGRAM(s) IV Push every 8 hours PRN  pantoprazole    Tablet 40 milliGRAM(s) Oral before breakfast  polyethylene glycol 3350 17 Gram(s) Oral daily  primidone 50 milliGRAM(s) Oral daily  senna 2 Tablet(s) Oral at bedtime  simvastatin 40 milliGRAM(s) Oral at bedtime  sodium chloride 0.9%. 1000 milliLiter(s) IV Continuous <Continuous>      Allergies    No Known Allergies    Intolerances        SOCIAL HISTORY:  Tobacco: Denies  EtOH: Denies   Recreational drug use: Denies   Lives with: daughter and grandkids   Ambulates: with a cane   ADLs: with assistance   Vaccinations: Moderna 3/3       FAMILY HISTORY:  FH: heart disease  father, brothers  FH: type 2 diabetes  mother        ROS:    EYES:  Negative  blurry vision or double vision  GASTROINTESTINAL:  Negative for nausea, vomiting, diarrhea  -otherwise negative except for subjective    Vital Signs Last 24 Hrs  T(C): 36.4 (05 May 2022 08:30), Max: 36.6 (05 May 2022 03:20)  T(F): 97.6 (05 May 2022 08:30), Max: 97.9 (05 May 2022 03:20)  HR: 61 (05 May 2022 08:30) (60 - 74)  BP: 128/76 (05 May 2022 08:30) (100/47 - 128/76)  BP(mean): --  RR: 18 (05 May 2022 08:30) (15 - 18)  SpO2: 93% (05 May 2022 08:30) (93% - 99%)    PE:  WDWN in no distress  HEENT:  NC, PERRL, sclerae anicteric, conjunctivae clear, EOMI.  Sinuses nontender, no nasal exudate.  No buccal or pharyngeal lesions, erythema or exudate  Neck:  Supple, no adenopathy  Lungs:  No accessory muscle use, crackles anteriorly left >R  Cor:  distant  Abd:  Symmetric, normoactive BS.  Soft, nontender, no masses, guarding or rebound.  Liver and spleen not enlarged  Extrem:  No cyanosis or edema  Skin:  No rashes.  Neuro: baseline resting tremor, soft spoken, alert  Musc: moving all limbs freely, no focal deficits      LABS:                        12.5   7.48  )-----------( 259      ( 05 May 2022 08:48 )             37.2       WBC Count: 7.48 K/uL (22 @ 08:48)  WBC Count: 10.33 K/uL (22 @ 02:15)      -    143  |  109<H>  |  19  ----------------------------<  112<H>  3.6   |  26  |  1.10    Ca    9.5      05 May 2022 08:48  Phos  2.9     05-  Mg     2.4     -    TPro  6.4  /  Alb  2.9<L>  /  TBili  0.4  /  DBili  x   /  AST  11<L>  /  ALT  18  /  AlkPhos  68  05-      Creatinine, Serum: 1.10 mg/dL (22 @ 08:48)  Creatinine, Serum: 1.80 mg/dL (22 @ 02:15)      Urinalysis Basic - ( 05 May 2022 04:58 )    Color: Yellow / Appearance: Slightly Turbid / S.020 / pH: x  Gluc: x / Ketone: Trace  / Bili: Small / Urobili: Negative   Blood: x / Protein: 30 mg/dL / Nitrite: Negative   Leuk Esterase: Moderate / RBC: 3-5 /HPF / WBC 11-25   Sq Epi: x / Non Sq Epi: Occasional / Bacteria: Few      MICROBIOLOGY:      RADIOLOGY & ADDITIONAL STUDIES:    --< from: NM PET/CT Onc FDG Skull to Thigh, Subsq (22 @ 12:39) >    EXAM: [id number] - PETCT SKUL-THI ONC FDG SUBS  - ORDERED BY: CORBY ROPER      PROCEDURE DATE:  2022           INTERPRETATION:  PROCEDURE:  PET/CT SKULL BASE-MID THIGH IMAGING    CLINICAL INFORMATION: 77-year-old male with a history of left ventral   tongue squamous cell carcinoma status post partial revision glossectomy   2021, completed adjuvant RT 2021 and neck dissection on   2022. PET/CT is done as part of subsequent treatment strategy   evaluation. Recent neck CT showed new ill-defined enhancing lesion   involving the proximal right sternocleidomastoid and possibly the deep   parotid gland just inferior to the mastoid tip, concerning for neoplasm   recurrence.    RADIOPHARMACEUTICAL:  11.76 mCi F-18, FDG, I.V.    TECHNIQUE:  Fasting blood sugar measured prior to injection of   radiopharmaceutical was 113 mg/dl. Following intravenous injection of the   radiopharmaceutical and an uptake period of approximately 53 minutes,   FDG-PET/CT was obtained on a Siemens Biograph mCT 64 PET/CT scanner from   skull base to mid thigh. A dedicated head and neck imaging was performed   thereafter. Patient is unable to tolerate head rest. Oral contrast was   given during the uptake period. CT was performed during shallow   respiration. The CT protocol was optimized for PET attenuation correction   and anatomic localization. The CT protocol was not designed to produce   and cannot replace state-of-the-art diagnostic CT images with specific   imaging protocols for different body parts and indications. Images were   reviewed on a dedicated workstation using multiplanar reconstruction.    The standardized uptake values (SUV) are normalized to patient body   weight and indicate the highest activity concentration (SUVmax) in a   given disease site. All image numbers refer to axial image number.    COMPARISON:  PET/CT from 2021.    OTHER STUDIES USED FOR CORRELATION: CT of neck and chest from 2022   and neck CT from 1/15/2022.    FINDINGS:    HEAD/NECK: New difficult to delineate extensive heterogeneous FDG   activity in the RIGHT lateral neck associated with surgical clips and   adjacent to the right sternocleidomastoid (SUV 15.84; image 91 of the   head and neck series). This FDG avidity includes the regionwhere an FDG   avid right level IIb cervical lymph node as seen on the prior study. FDG   activity extends to the overlying skin in the inferior aspect (SUV 7.2;   image 109). Additional difficult to delineate adjacent punctate foci just   lateral tothe right thyroid cartilage; reference anterior dislocated   focus associated with surgical clip (SUV 7.5; image 123).    New FDG avid 0.8 cm cutaneous nodule in the LEFT lateral neck (SUV 7.0;   image 114). Additional new punctate subcutaneous focusoverlying the left   clavicle (SUV 2.8; image 134 of the head and neck series). Physiologic   FDG activity in brain, head and neck.    CHEST: No abnormal FDG activity. No lymphadenopathy. Physiologic FDG   activity in the myocardium and blood pool.    LUNGS: New FDG avid groundglass and nodular opacities in the left upper   lung, also seen on recent CT. Reference subpleural groundglass opacity in   the anterior left upper lobe (SUV 2.7; image 91). Nodular opacity along   the left major fissure which is slightly more prominent than on   comparison CT (SUV 3.1; image 100). Nodular opacity in the left lower   lobe (SUV 3.3; image 102).    PLEURA/PERICARDIUM: No abnormal FDG activity. No effusion.    HEPATOBILIARY/PANCREAS:  Physiologic FDG activity. Liver SUV mean is 2.5,   previously 2.4.    SPLEEN: Physiologic FDG activity. Normal in size.    ADRENAL GLANDS: No abnormal FDG activity. No nodule.    KIDNEYS/URINARY BLADDER: Physiologic excreted FDG activity. Bilateral   renal cysts.    REPRODUCTIVE ORGANS: No abnormal FDG activity. Fiduciary markers within   the prostate gland.    ABDOMINOPELVIC NODES: No enlarged or FDG-avid lymph node.    BOWEL/PERITONEUM/MESENTERY: Nonspecific new mild FDG avidity in the   sigmoid colon without CT correlate (SUV 5.6; image 188).    BONES/SOFT TISSUES: No abnormal FDG activity. Severe S-shaped   thoracolumbar scoliosis, as on prior study.    IMPRESSION: Compared to FDG-PET/CT scan.    1. New difficult delineate heterogeneous FDG avidity associated with   surgical clips in the right lateral neck adjacent to the right   sternocleidomastoid suspicious for disease recurrence. This is in the   region where an FDG avid right level IIb cervical lymph node was seen on   the prior study. This may be further evaluated with MRI if without   contraindications.    2. Nonspecific new FDG avid cutaneous nodule in the LEFT lateral neck and   additional FDG avid subcutaneous focus overlying the LEFT clavicle.   Please correlate with direct visualization.    3. New mildly FDG avid groundglass and nodular opacities in the left   upper and left lower lungs, also seen on CT from 2022 and probably   not significantly changed allowing for differences in technique. This is   nonspecific and may be inflammatory/infectious in nature. Please   correlate clinically or with follow-up chest CT to assess for resolution.    4. New mild hypermetabolism in the sigmoid colon without CT correlate,   physiologic versus inflammatory. Correlate clinically.    < from: CT Chest w/ IV Cont (22 @ 10:12) >    EXAM: [id number] - CT CHEST IC  - ORDERED BY: CORBY ROPER      PROCEDURE DATE:  2022           INTERPRETATION:  .  ACC: [id number]  INDICATION: Tongue squamous cell carcinoma  TECHNIQUE: Contrast-enhanced CT of the chest. Maximum intensity   projection images were generated. Images were obtained after the   uneventful administration of 90 cc intravenous Omnipaque 350.  COMPARISON: PET CT 2021    FINDINGS:    AIRWAYS, LUNGS and PLEURA: Patent central airways. Patchy groundglass   opacities within posterior upper lobes and basilar lower lobes are   nonspecific but can represent aspiration pneumonitis. Subsegmental   atelectasis at the left base. No suspicious lung nodule. No pleural   effusion.    MEDIASTINUM AND STEPHANIE: No lymphadenopathy.    HEART AND VESSELS: Heart size is normal. No pericardial effusion.   Thoracic aorta and pulmonary artery normal in diameter. Aortic and   coronary calcifications.    VISUALIZED UPPER ABDOMEN: Left renal cysts.    CHEST WALL AND BONES: No aggressive osseous lesion. Scoliosis.    LOWER NECK: Please review the report of the CT of the neck performed the   same day.    IMPRESSION:    Patchy groundglass opacities within posterior upper lobes and basilar   lower lobes are nonspecific but can represent aspiration pneumonitis.      KEMAR TREADWELL MD; Attending Radiologist   This document has been electronically signed. 2022  8:03AM

## 2023-01-10 NOTE — TELEPHONE ENCOUNTER
Patient notified of above and verbalized understanding. Patient requested referral to Dr Laurie Jensen. Referral placed. Lab order mailed to address on profile.

## 2023-01-11 ENCOUNTER — TELEPHONE (OUTPATIENT)
Dept: FAMILY MEDICINE CLINIC | Age: 88
End: 2023-01-11

## 2023-01-11 ENCOUNTER — TELEPHONE (OUTPATIENT)
Dept: NEUROLOGY | Age: 88
End: 2023-01-11

## 2023-01-11 NOTE — TELEPHONE ENCOUNTER
Patient caregiver Akosua Womack called stating despite phone conversation yesterday, patient took her Dilantin last night. Patient has not taken it yet today. Spoke with Dr Eri Louis who stated to have patient hold Dilantin today and start Dilantin 100 mg twice a day tomorrow and have level repeated after 1 week. Patient and Akosua Womack notified and verbalized understanding.

## 2023-01-11 NOTE — TELEPHONE ENCOUNTER
Pt called stating she is unable to leave her home to get her labs done due to leg/body weakness and other health issues per pt. Pt ask if she can get a home health aide to help her at home for some daily home needs such as helping with meals,chores,hygiene. Pt also would like this facility to be able to do blood draws from her home. Please call pt once addressed.     Pt stated she cannot get out for appts or lab draws

## 2023-01-12 NOTE — TELEPHONE ENCOUNTER
I noticed that she just was seen at the neurologist's office 3 days ago. Insurance and medicare will not pay for a home health aid. She could look into [facility] and [facility] as they have aids which she would have to pay for herself.

## 2023-01-13 ENCOUNTER — TELEPHONE (OUTPATIENT)
Dept: NEUROLOGY | Age: 88
End: 2023-01-13

## 2023-01-13 NOTE — TELEPHONE ENCOUNTER
Eleuterio Mayer, caregiver, called stating she set Dilantin out yesterday for patient to take her evening dose. This morning, patient was not sure if she took her Dilantin dose last night or this morning. Instructed to have patient resume ordered dose of Dilantin 100 mg twice a day with dose this evening. Instructed to closely monitor patient with medication intake, take as ordered without missing any doses and repeat level in 1 week per. Eleuterio Mayer verbalized understanding. Dr Jose Maria Massey notified of above.

## 2023-01-23 ENCOUNTER — TELEPHONE (OUTPATIENT)
Dept: NEUROLOGY | Age: 88
End: 2023-01-23

## 2023-01-23 NOTE — TELEPHONE ENCOUNTER
Spoke with patient who stated she did not get Phenytoin level drawn today as she did not have a caregiver available to take her out to get level drawn. Patient stated her caregiver will be in tomorrow and weather permitting, she will attempt to get Phenytoin level drawn. Informed patient will check back tomorrow. Patient stated she is taking Dilantin 100 mg twice a day as instructed.

## 2023-01-24 NOTE — TELEPHONE ENCOUNTER
Spoke with patient who stated she will get Dilantin level done this afternoon. Awaiting lab results.

## 2023-01-26 ENCOUNTER — TELEPHONE (OUTPATIENT)
Dept: NEUROLOGY | Age: 88
End: 2023-01-26

## 2023-01-26 NOTE — TELEPHONE ENCOUNTER
----- Message from Chrissy Major MD sent at 1/26/2023  8:29 AM EST -----  Please let patient Know Dilantin level was normal. Continue with current new dosage. No changes.  Chrissy Major MD

## 2023-05-09 DIAGNOSIS — R56.9 PARTIAL SEIZURE (HCC): ICD-10-CM

## 2023-05-09 RX ORDER — PHENYTOIN SODIUM 100 MG/1
CAPSULE, EXTENDED RELEASE ORAL
Qty: 250 CAPSULE | Refills: 1 | Status: SHIPPED | OUTPATIENT
Start: 2023-05-09

## 2023-05-16 DIAGNOSIS — G40.909 SEIZURE DISORDER (HCC): Primary | ICD-10-CM

## 2023-05-16 DIAGNOSIS — R56.9 PARTIAL SEIZURE (HCC): ICD-10-CM

## 2023-05-16 DIAGNOSIS — R56.9 PARTIAL SEIZURE (HCC): Primary | ICD-10-CM

## 2023-05-16 RX ORDER — PHENYTOIN SODIUM 100 MG/1
100 CAPSULE, EXTENDED RELEASE ORAL 2 TIMES DAILY
Qty: 180 CAPSULE | Refills: 0 | Status: SHIPPED | OUTPATIENT
Start: 2023-05-16

## 2023-09-21 DIAGNOSIS — R56.9 PARTIAL SEIZURE (HCC): ICD-10-CM

## 2023-09-21 RX ORDER — PHENYTOIN SODIUM 100 MG/1
100 CAPSULE, EXTENDED RELEASE ORAL 2 TIMES DAILY
Qty: 180 CAPSULE | Refills: 1 | Status: SHIPPED | OUTPATIENT
Start: 2023-09-21

## 2023-09-21 NOTE — TELEPHONE ENCOUNTER
2 attempts to contact patient but phone rings busy. Dilantin lab order mailed to address on profile.

## 2024-03-23 DIAGNOSIS — R56.9 PARTIAL SEIZURE (HCC): ICD-10-CM

## 2024-03-25 RX ORDER — PHENYTOIN SODIUM 100 MG/1
100 CAPSULE, EXTENDED RELEASE ORAL 2 TIMES DAILY
Qty: 180 CAPSULE | Refills: 1 | Status: SHIPPED | OUTPATIENT
Start: 2024-03-25

## 2024-03-25 NOTE — TELEPHONE ENCOUNTER
Estelita Sosa called requesting a refill on the following medications:  Requested Prescriptions     Pending Prescriptions Disp Refills    DILANTIN 100 MG ER capsule [Pharmacy Med Name: DILANTIN 100 MG CAPSULE] 180 capsule 1     Sig: TAKE 1 CAPSULE BY MOUTH TWICE A DAY       Date of last visit: 1/9/2023- Dr. Krishnan  Date of next visit (if applicable):Visit date not found  Date of last refill: 9/21/23  Pharmacy Name: [facility] [address]      Thanks,  Martha Oliveira LPN     Dr. Ozuna

## 2024-05-15 ENCOUNTER — TELEPHONE (OUTPATIENT)
Dept: FAMILY MEDICINE CLINIC | Age: 89
End: 2024-05-15

## 2024-05-15 NOTE — TELEPHONE ENCOUNTER
Rosario Mahoney [facility] out reach coordinator called. States Pt is . Has no family around but she has been struggling with bumps on her face and body. She wondering if there is anything dr can do for pt by her not being able to come in the office. Rosario states dr or nurse is more than welcome to call her personal cell regarding this matter as she is very concerned for pt. Cell number is in comments

## 2024-05-16 NOTE — TELEPHONE ENCOUNTER
Spoke with Rosario and nikky scheduled for today.  She is going to contact Estelita and let her know that she needs to be seen in the office.

## 2024-05-20 ENCOUNTER — TELEPHONE (OUTPATIENT)
Dept: FAMILY MEDICINE CLINIC | Age: 89
End: 2024-05-20

## 2024-05-20 NOTE — TELEPHONE ENCOUNTER
She states she fell 2 days ago and has some bruises. A friend came by and wanted her to go to the ED. Estelita wanted my opinion. I told her I couldn't say without seeing her and she would have to use her own judgement. She said today she is feeling better and wanted us to call her tomorrow and check on her. I told her one of the staff would call.

## 2024-05-21 NOTE — TELEPHONE ENCOUNTER
Called allie and said she was weak and tired.  Patient just sticking it out in bed, she doesn't have any help today.

## 2024-05-21 NOTE — TELEPHONE ENCOUNTER
Rosario called back stating that Estelita is refusing to come into the office so she cancelled the appt.

## 2024-05-22 ENCOUNTER — CLINICAL DOCUMENTATION (OUTPATIENT)
Dept: SPIRITUAL SERVICES | Facility: CLINIC | Age: 89
End: 2024-05-22

## 2024-05-22 NOTE — FLOWSHEET NOTE
Pt is an 89y.o. female. Family member met with  in-office, re: AD's. Please refer to ACP note for context.     05/22/24 1605   Encounter Summary   Encounter Overview/Reason Advance Care Planning   Service Provided For Family   Referral/Consult From Family   Support System Family members   Last Encounter  05/22/24   Complexity of Encounter Low   Begin Time 1555   End Time  1600   Total Time Calculated 5 min   Advance Care Planning   Type   (Received, reviewed and processed, pt-provided AD's.)   Assessment/Intervention/Outcome   Assessment Calm;Peaceful   Intervention Active listening;Explored/Affirmed feelings, thoughts, concerns   Outcome Expressed Gratitude;Receptive

## 2024-05-22 NOTE — ACP (ADVANCE CARE PLANNING)
Advance Care Planning   Advance Care Planning Note  Ambulatory Spiritual Care Services    Date:  5/22/2024    Received request from family.    Consultation conversation participants:   Patient's friend(s)     Goals of ACP Conversation:  Receive, review and process for chart inclusion, patient-provided Advance Directive documents    Health Care Decision Makers:      Primary Decision Maker: Jameson Carrera - Friend - [phone]    Secondary Decision Maker: MICHAEL Suazo - Friend - [phone]   Summary:  Verified Documents  Verified Healthcare Decision Maker  Updated Healthcare Decision Maker    Advance Care Planning Documents (Patient Wishes)  Currently on file:   Healthcare Power of /Advance Directive Appointment of Health Care Agent  Living Will/Advance Directive    Assessment:   Pt is an 89y.o. female.  met with family member in-lobby at [facility], to receive, review and process for chart inclusion, properly completed AD documents as indicated.    Interventions:  Received, reviewed and processed for chart inclusion, patient-provided AD's.    Care Preferences Communicated:   No    Outcomes:  Existing advance directive reviewed with patient; no changes to patient's previously recorded wishes.  Copy of advance directive given to staff to scan into medical record.  Routed ACP note to attending provider or other IDT member.      Electronically signed by Chaplain Katherine on 5/22/2024 at 4:10 PM.

## 2024-06-11 ENCOUNTER — TELEPHONE (OUTPATIENT)
Dept: FAMILY MEDICINE CLINIC | Age: 89
End: 2024-06-11

## 2024-06-11 ENCOUNTER — TELEPHONE (OUTPATIENT)
Dept: NEUROLOGY | Age: 89
End: 2024-06-11

## 2024-06-11 NOTE — TELEPHONE ENCOUNTER
Patient reports that she is taking Ibuprofen for pain in her bilateral knees and feet. Patient reports she was told that she should not take Ibuprofen with Dilantin 100 mg BID?  Patient is questioning if it is ok to take Ibuprofen with Dilantin or if she should take another pain reliever

## 2024-06-11 NOTE — TELEPHONE ENCOUNTER
Pt called stating she is on Dilantin and asking if she should be taking ibuprofen and pharmacy informed pt about this medication she has been taking ibuprofen due to her leg's hurting    Please call pt once addressed [phone]

## 2024-06-11 NOTE — TELEPHONE ENCOUNTER
There is no interaction between dilantin and motrin. She should limit the over the counter motrin to no more than 2 per day.

## 2024-08-01 ENCOUNTER — TELEPHONE (OUTPATIENT)
Dept: FAMILY MEDICINE CLINIC | Age: 89
End: 2024-08-01

## 2024-08-01 NOTE — TELEPHONE ENCOUNTER
If she has a pain that needs tylenol and ibuprofen to help control it she needs to be seen rather than just saying it's ok to take the tylenol.

## 2024-08-01 NOTE — TELEPHONE ENCOUNTER
Pt called she is taking Dilantin 100mg ER, we did not prescribe this for her, she wants to know how many Ibuprofen, 2 or 3, she should be taking with it? She called [facility] office and they told her to call us.     Please call after addressed.

## 2024-08-29 ENCOUNTER — TELEPHONE (OUTPATIENT)
Dept: FAMILY MEDICINE CLINIC | Age: 89
End: 2024-08-29

## 2024-08-29 DIAGNOSIS — R56.9 PARTIAL SEIZURE (HCC): ICD-10-CM

## 2024-08-29 NOTE — TELEPHONE ENCOUNTER
Pt called stating that both her legs are achy. She said mainly in the back of her legs and knees. She said that she knows she needs to walk more but she is not able to walk without help because she is afraid of falling. She also said that she is NOT able to come in for an appointment. She heard that she could possibly use Voltaren Gel to help with the pain but then someone else told her not to use it. She is calling to find out Dr. Acharya's Opinion on her using this or if there is something else that she can use to help with the pain.    Estelita may be reached at [phone]

## 2024-08-29 NOTE — TELEPHONE ENCOUNTER
Estelita Sosa called requesting a refill on the following medications:  Requested Prescriptions     Pending Prescriptions Disp Refills    DILANTIN 100 MG ER capsule 180 capsule 1     Sig: Take 1 capsule by mouth 2 times daily       Date of last visit: 1/9/2023 Dr. Krishnan  Date of next visit (if applicable):9/30/2024- Dr. Krishnan  Date of last refill: 3/25/24  Pharmacy Name: [facility]

## 2024-08-30 RX ORDER — PHENYTOIN SODIUM 100 MG/1
100 CAPSULE, EXTENDED RELEASE ORAL 2 TIMES DAILY
Qty: 180 CAPSULE | Refills: 1 | Status: SHIPPED | OUTPATIENT
Start: 2024-08-30

## 2024-11-13 ENCOUNTER — APPOINTMENT (OUTPATIENT)
Dept: GENERAL RADIOLOGY | Age: 89
DRG: 543 | End: 2024-11-13
Payer: MEDICARE

## 2024-11-13 ENCOUNTER — APPOINTMENT (OUTPATIENT)
Dept: CT IMAGING | Age: 89
DRG: 543 | End: 2024-11-13
Payer: MEDICARE

## 2024-11-13 ENCOUNTER — HOSPITAL ENCOUNTER (INPATIENT)
Age: 89
LOS: 5 days | Discharge: NURSING, REHAB OR LONG-TERM CARE | DRG: 543 | End: 2024-11-19
Admitting: STUDENT IN AN ORGANIZED HEALTH CARE EDUCATION/TRAINING PROGRAM
Payer: MEDICARE

## 2024-11-13 DIAGNOSIS — Z78.9 UNABLE TO CARE FOR SELF: ICD-10-CM
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
## 2024-11-13 DIAGNOSIS — R33.9 URINARY RETENTION: ICD-10-CM

## 2024-11-13 DIAGNOSIS — N94.89 PELVIC HEMATOMA IN FEMALE: ICD-10-CM

## 2024-11-13 DIAGNOSIS — S32.402A CLOSED NONDISPLACED FRACTURE OF LEFT ACETABULUM, UNSPECIFIED PORTION OF ACETABULUM, INITIAL ENCOUNTER (HCC): ICD-10-CM

## 2024-11-13 DIAGNOSIS — S32.592A FRACTURE OF MULTIPLE PUBIC RAMI, LEFT, CLOSED, INITIAL ENCOUNTER (HCC): ICD-10-CM

## 2024-11-13 LAB
ANION GAP SERPL CALC-SCNC: 13 MEQ/L (ref 8–16)
BASOPHILS ABSOLUTE: 0 THOU/MM3 (ref 0–0.1)
BASOPHILS NFR BLD AUTO: 0.2 %
BUN SERPL-MCNC: 27 MG/DL (ref 7–22)
CALCIUM SERPL-MCNC: 9 MG/DL (ref 8.5–10.5)
CHLORIDE SERPL-SCNC: 107 MEQ/L (ref 98–111)
CO2 SERPL-SCNC: 22 MEQ/L (ref 23–33)
CREAT SERPL-MCNC: 1 MG/DL (ref 0.4–1.2)
DEPRECATED RDW RBC AUTO: 54.4 FL (ref 35–45)
EOSINOPHIL NFR BLD AUTO: 0.9 %
EOSINOPHILS ABSOLUTE: 0.1 THOU/MM3 (ref 0–0.4)
ERYTHROCYTE [DISTWIDTH] IN BLOOD BY AUTOMATED COUNT: 14.3 % (ref 11.5–14.5)
GFR SERPL CREATININE-BSD FRML MDRD: 53 ML/MIN/1.73M2
GLUCOSE SERPL-MCNC: 137 MG/DL (ref 70–108)
HCT VFR BLD AUTO: 38.8 % (ref 37–47)
HGB BLD-MCNC: 12.7 GM/DL (ref 12–16)
IMM GRANULOCYTES # BLD AUTO: 0.08 THOU/MM3 (ref 0–0.07)
IMM GRANULOCYTES NFR BLD AUTO: 0.8 %
INR PPP: 0.94 (ref 0.85–1.13)
LACTATE SERPL-SCNC: 1.5 MMOL/L (ref 0.5–2)
LYMPHOCYTES ABSOLUTE: 0.6 THOU/MM3 (ref 1–4.8)
LYMPHOCYTES NFR BLD AUTO: 5.7 %
MCH RBC QN AUTO: 34.1 PG (ref 26–33)
MCHC RBC AUTO-ENTMCNC: 32.7 GM/DL (ref 32.2–35.5)
MCV RBC AUTO: 104.3 FL (ref 81–99)
MONOCYTES ABSOLUTE: 0.7 THOU/MM3 (ref 0.4–1.3)
MONOCYTES NFR BLD AUTO: 6.8 %
NEUTROPHILS ABSOLUTE: 8.9 THOU/MM3 (ref 1.8–7.7)
NEUTROPHILS NFR BLD AUTO: 85.6 %
NRBC BLD AUTO-RTO: 0 /100 WBC
OSMOLALITY SERPL CALC.SUM OF ELEC: 290.4 MOSMOL/KG (ref 275–300)
PLATELET # BLD AUTO: 120 THOU/MM3 (ref 130–400)
PMV BLD AUTO: 9.5 FL (ref 9.4–12.4)
POTASSIUM SERPL-SCNC: 4.1 MEQ/L (ref 3.5–5.2)
PROCALCITONIN SERPL IA-MCNC: 0.09 NG/ML (ref 0.01–0.09)
RBC # BLD AUTO: 3.72 MILL/MM3 (ref 4.2–5.4)
SODIUM SERPL-SCNC: 142 MEQ/L (ref 135–145)
TROPONIN, HIGH SENSITIVITY: 14 NG/L (ref 0–12)
TROPONIN, HIGH SENSITIVITY: 17 NG/L (ref 0–12)
WBC # BLD AUTO: 10.4 THOU/MM3 (ref 4.8–10.8)

## 2024-11-13 PROCEDURE — 86850 RBC ANTIBODY SCREEN: CPT

## 2024-11-13 PROCEDURE — 71045 X-RAY EXAM CHEST 1 VIEW: CPT

## 2024-11-13 PROCEDURE — 85025 COMPLETE CBC W/AUTO DIFF WBC: CPT

## 2024-11-13 PROCEDURE — 83605 ASSAY OF LACTIC ACID: CPT

## 2024-11-13 PROCEDURE — 36415 COLL VENOUS BLD VENIPUNCTURE: CPT

## 2024-11-13 PROCEDURE — 84484 ASSAY OF TROPONIN QUANT: CPT

## 2024-11-13 PROCEDURE — 73502 X-RAY EXAM HIP UNI 2-3 VIEWS: CPT

## 2024-11-13 PROCEDURE — 80048 BASIC METABOLIC PNL TOTAL CA: CPT

## 2024-11-13 PROCEDURE — 86901 BLOOD TYPING SEROLOGIC RH(D): CPT

## 2024-11-13 PROCEDURE — 72192 CT PELVIS W/O DYE: CPT

## 2024-11-13 PROCEDURE — 99285 EMERGENCY DEPT VISIT HI MDM: CPT

## 2024-11-13 PROCEDURE — 93005 ELECTROCARDIOGRAM TRACING: CPT | Performed by: NURSE PRACTITIONER

## 2024-11-13 PROCEDURE — 84145 PROCALCITONIN (PCT): CPT

## 2024-11-13 PROCEDURE — 72125 CT NECK SPINE W/O DYE: CPT

## 2024-11-13 PROCEDURE — 73630 X-RAY EXAM OF FOOT: CPT

## 2024-11-13 PROCEDURE — 85610 PROTHROMBIN TIME: CPT

## 2024-11-13 PROCEDURE — 86900 BLOOD TYPING SEROLOGIC ABO: CPT

## 2024-11-13 PROCEDURE — 6370000000 HC RX 637 (ALT 250 FOR IP): Performed by: NURSE PRACTITIONER

## 2024-11-13 PROCEDURE — 6820000001 HC L2 TRAUMA SURGERY EVALUATION

## 2024-11-13 PROCEDURE — 70450 CT HEAD/BRAIN W/O DYE: CPT

## 2024-11-13 RX ORDER — ACETAMINOPHEN 325 MG/1
650 TABLET ORAL ONCE
Status: COMPLETED | OUTPATIENT
Start: 2024-11-13 | End: 2024-11-13

## 2024-11-13 RX ADMIN — ACETAMINOPHEN 650 MG: 325 TABLET ORAL at 20:49

## 2024-11-13 ASSESSMENT — PAIN - FUNCTIONAL ASSESSMENT
PAIN_FUNCTIONAL_ASSESSMENT: NONE - DENIES PAIN
PAIN_FUNCTIONAL_ASSESSMENT: 0-10
PAIN_FUNCTIONAL_ASSESSMENT: NONE - DENIES PAIN

## 2024-11-13 ASSESSMENT — PAIN SCALES - GENERAL: PAINLEVEL_OUTOF10: 3

## 2024-11-14 PROBLEM — S32.592A: Status: ACTIVE | Noted: 2024-11-14

## 2024-11-14 LAB
ABO: NORMAL
ANTIBODY SCREEN: NORMAL
BACTERIA URNS QL MICRO: ABNORMAL /HPF
BASOPHILS ABSOLUTE: 0 THOU/MM3 (ref 0–0.1)
BASOPHILS NFR BLD AUTO: 0.3 %
BILIRUB UR QL STRIP.AUTO: NEGATIVE
CASTS #/AREA URNS LPF: ABNORMAL /LPF
CASTS 2: ABNORMAL /LPF
CHARACTER UR: ABNORMAL
COLOR, UA: YELLOW
CRYSTALS URNS MICRO: ABNORMAL
DEPRECATED MEAN GLUCOSE BLD GHB EST-ACNC: 96 MG/DL (ref 70–126)
DEPRECATED RDW RBC AUTO: 55.2 FL (ref 35–45)
EKG ATRIAL RATE: 69 BPM
EKG P AXIS: 84 DEGREES
EKG P-R INTERVAL: 190 MS
EKG Q-T INTERVAL: 428 MS
EKG QRS DURATION: 134 MS
EKG QTC CALCULATION (BAZETT): 458 MS
EKG R AXIS: -12 DEGREES
EKG T AXIS: 92 DEGREES
EKG VENTRICULAR RATE: 69 BPM
EOSINOPHIL NFR BLD AUTO: 1 %
EOSINOPHILS ABSOLUTE: 0.1 THOU/MM3 (ref 0–0.4)
EPITHELIAL CELLS, UA: ABNORMAL /HPF
ERYTHROCYTE [DISTWIDTH] IN BLOOD BY AUTOMATED COUNT: 14.4 % (ref 11.5–14.5)
GLUCOSE UR QL STRIP.AUTO: NEGATIVE MG/DL
HBA1C MFR BLD HPLC: 5.2 % (ref 4.4–6.4)
HCT VFR BLD AUTO: 34.4 % (ref 37–47)
HCT VFR BLD AUTO: 34.9 % (ref 37–47)
HCT VFR BLD AUTO: 35.5 % (ref 37–47)
HGB BLD-MCNC: 11.1 GM/DL (ref 12–16)
HGB BLD-MCNC: 11.4 GM/DL (ref 12–16)
HGB BLD-MCNC: 11.6 GM/DL (ref 12–16)
HGB UR QL STRIP.AUTO: NEGATIVE
IMM GRANULOCYTES # BLD AUTO: 0.04 THOU/MM3 (ref 0–0.07)
IMM GRANULOCYTES NFR BLD AUTO: 0.6 %
KETONES UR QL STRIP.AUTO: ABNORMAL
LYMPHOCYTES ABSOLUTE: 0.6 THOU/MM3 (ref 1–4.8)
LYMPHOCYTES NFR BLD AUTO: 9 %
MCH RBC QN AUTO: 33.8 PG (ref 26–33)
MCHC RBC AUTO-ENTMCNC: 32.3 GM/DL (ref 32.2–35.5)
MCV RBC AUTO: 104.9 FL (ref 81–99)
MISCELLANEOUS 2: ABNORMAL
MONOCYTES ABSOLUTE: 0.6 THOU/MM3 (ref 0.4–1.3)
MONOCYTES NFR BLD AUTO: 8.3 %
MUCOUS THREADS URNS QL MICRO: ABNORMAL
NEUTROPHILS ABSOLUTE: 5.7 THOU/MM3 (ref 1.8–7.7)
NEUTROPHILS NFR BLD AUTO: 80.8 %
NITRITE UR QL STRIP: NEGATIVE
NRBC BLD AUTO-RTO: 0 /100 WBC
PH UR STRIP.AUTO: 6.5 [PH] (ref 5–9)
PLATELET # BLD AUTO: 92 THOU/MM3 (ref 130–400)
PLATELET BLD QL SMEAR: ABNORMAL
PMV BLD AUTO: 9.3 FL (ref 9.4–12.4)
PROT UR STRIP.AUTO-MCNC: 100 MG/DL
RBC # BLD AUTO: 3.28 MILL/MM3 (ref 4.2–5.4)
RBC URINE: ABNORMAL /HPF
RENAL EPI CELLS #/AREA URNS HPF: ABNORMAL /[HPF]
RH FACTOR: NORMAL
SCAN OF BLOOD SMEAR: NORMAL
SP GR UR REFRACT.AUTO: 1.02 (ref 1–1.03)
UROBILINOGEN, URINE: 1 EU/DL (ref 0–1)
WBC # BLD AUTO: 7 THOU/MM3 (ref 4.8–10.8)
WBC #/AREA URNS HPF: ABNORMAL /HPF
WBC #/AREA URNS HPF: NEGATIVE /[HPF]
YEAST LIKE FUNGI URNS QL MICRO: ABNORMAL

## 2024-11-14 PROCEDURE — 6360000002 HC RX W HCPCS: Performed by: PHYSICIAN ASSISTANT

## 2024-11-14 PROCEDURE — 93010 ELECTROCARDIOGRAM REPORT: CPT | Performed by: INTERNAL MEDICINE

## 2024-11-14 PROCEDURE — 87335 E COLI 0157 AG IA: CPT

## 2024-11-14 PROCEDURE — 6360000002 HC RX W HCPCS

## 2024-11-14 PROCEDURE — 51701 INSERT BLADDER CATHETER: CPT

## 2024-11-14 PROCEDURE — 1200000003 HC TELEMETRY R&B

## 2024-11-14 PROCEDURE — 6370000000 HC RX 637 (ALT 250 FOR IP)

## 2024-11-14 PROCEDURE — 81001 URINALYSIS AUTO W/SCOPE: CPT

## 2024-11-14 PROCEDURE — 87186 SC STD MICRODIL/AGAR DIL: CPT

## 2024-11-14 PROCEDURE — 51702 INSERT TEMP BLADDER CATH: CPT

## 2024-11-14 PROCEDURE — 87086 URINE CULTURE/COLONY COUNT: CPT

## 2024-11-14 PROCEDURE — 85014 HEMATOCRIT: CPT

## 2024-11-14 PROCEDURE — 36415 COLL VENOUS BLD VENIPUNCTURE: CPT

## 2024-11-14 PROCEDURE — 97530 THERAPEUTIC ACTIVITIES: CPT

## 2024-11-14 PROCEDURE — 87077 CULTURE AEROBIC IDENTIFY: CPT

## 2024-11-14 PROCEDURE — 51798 US URINE CAPACITY MEASURE: CPT

## 2024-11-14 PROCEDURE — 2580000003 HC RX 258: Performed by: PHYSICIAN ASSISTANT

## 2024-11-14 PROCEDURE — 2580000003 HC RX 258

## 2024-11-14 PROCEDURE — 97162 PT EVAL MOD COMPLEX 30 MIN: CPT

## 2024-11-14 PROCEDURE — 97166 OT EVAL MOD COMPLEX 45 MIN: CPT

## 2024-11-14 PROCEDURE — 85018 HEMOGLOBIN: CPT

## 2024-11-14 PROCEDURE — 99223 1ST HOSP IP/OBS HIGH 75: CPT

## 2024-11-14 PROCEDURE — 85025 COMPLETE CBC W/AUTO DIFF WBC: CPT

## 2024-11-14 PROCEDURE — 6370000000 HC RX 637 (ALT 250 FOR IP): Performed by: PHYSICIAN ASSISTANT

## 2024-11-14 PROCEDURE — 83036 HEMOGLOBIN GLYCOSYLATED A1C: CPT

## 2024-11-14 RX ORDER — MORPHINE SULFATE 2 MG/ML
1 INJECTION, SOLUTION INTRAMUSCULAR; INTRAVENOUS
Status: DISCONTINUED | OUTPATIENT
Start: 2024-11-14 | End: 2024-11-19 | Stop reason: HOSPADM

## 2024-11-14 RX ORDER — LORAZEPAM 2 MG/ML
1 INJECTION INTRAMUSCULAR ONCE
Status: COMPLETED | OUTPATIENT
Start: 2024-11-14 | End: 2024-11-14

## 2024-11-14 RX ORDER — PHENYTOIN SODIUM 100 MG/1
100 CAPSULE, EXTENDED RELEASE ORAL 2 TIMES DAILY
Status: DISCONTINUED | OUTPATIENT
Start: 2024-11-14 | End: 2024-11-19 | Stop reason: HOSPADM

## 2024-11-14 RX ORDER — POLYETHYLENE GLYCOL 3350 17 G/17G
17 POWDER, FOR SOLUTION ORAL DAILY PRN
Status: DISCONTINUED | OUTPATIENT
Start: 2024-11-14 | End: 2024-11-19 | Stop reason: HOSPADM

## 2024-11-14 RX ORDER — ACETAMINOPHEN 325 MG/1
650 TABLET ORAL EVERY 6 HOURS PRN
Status: DISCONTINUED | OUTPATIENT
Start: 2024-11-14 | End: 2024-11-19 | Stop reason: HOSPADM

## 2024-11-14 RX ORDER — OXYCODONE HYDROCHLORIDE 5 MG/1
10 TABLET ORAL EVERY 4 HOURS PRN
Status: DISCONTINUED | OUTPATIENT
Start: 2024-11-14 | End: 2024-11-14

## 2024-11-14 RX ORDER — SODIUM CHLORIDE 9 MG/ML
INJECTION, SOLUTION INTRAVENOUS PRN
Status: DISCONTINUED | OUTPATIENT
Start: 2024-11-14 | End: 2024-11-19 | Stop reason: HOSPADM

## 2024-11-14 RX ORDER — OXYCODONE HYDROCHLORIDE 5 MG/1
5 TABLET ORAL EVERY 4 HOURS PRN
Status: DISCONTINUED | OUTPATIENT
Start: 2024-11-14 | End: 2024-11-14

## 2024-11-14 RX ORDER — MORPHINE SULFATE 2 MG/ML
2 INJECTION, SOLUTION INTRAMUSCULAR; INTRAVENOUS
Status: DISCONTINUED | OUTPATIENT
Start: 2024-11-14 | End: 2024-11-19 | Stop reason: HOSPADM

## 2024-11-14 RX ORDER — MUSCLE RUB CREAM 100; 150 MG/G; MG/G
CREAM TOPICAL 4 TIMES DAILY PRN
Status: DISCONTINUED | OUTPATIENT
Start: 2024-11-14 | End: 2024-11-19 | Stop reason: HOSPADM

## 2024-11-14 RX ORDER — POTASSIUM CHLORIDE 7.45 MG/ML
10 INJECTION INTRAVENOUS PRN
Status: DISCONTINUED | OUTPATIENT
Start: 2024-11-14 | End: 2024-11-14 | Stop reason: RX

## 2024-11-14 RX ORDER — SODIUM CHLORIDE 0.9 % (FLUSH) 0.9 %
5-40 SYRINGE (ML) INJECTION PRN
Status: DISCONTINUED | OUTPATIENT
Start: 2024-11-14 | End: 2024-11-19 | Stop reason: HOSPADM

## 2024-11-14 RX ORDER — ONDANSETRON 2 MG/ML
4 INJECTION INTRAMUSCULAR; INTRAVENOUS EVERY 6 HOURS PRN
Status: DISCONTINUED | OUTPATIENT
Start: 2024-11-14 | End: 2024-11-19 | Stop reason: HOSPADM

## 2024-11-14 RX ORDER — MAGNESIUM SULFATE IN WATER 40 MG/ML
2000 INJECTION, SOLUTION INTRAVENOUS PRN
Status: DISCONTINUED | OUTPATIENT
Start: 2024-11-14 | End: 2024-11-19 | Stop reason: HOSPADM

## 2024-11-14 RX ORDER — ONDANSETRON 4 MG/1
4 TABLET, ORALLY DISINTEGRATING ORAL EVERY 8 HOURS PRN
Status: DISCONTINUED | OUTPATIENT
Start: 2024-11-14 | End: 2024-11-19 | Stop reason: HOSPADM

## 2024-11-14 RX ORDER — SODIUM CHLORIDE 0.9 % (FLUSH) 0.9 %
5-40 SYRINGE (ML) INJECTION EVERY 12 HOURS SCHEDULED
Status: DISCONTINUED | OUTPATIENT
Start: 2024-11-14 | End: 2024-11-19 | Stop reason: HOSPADM

## 2024-11-14 RX ORDER — ACETAMINOPHEN 650 MG/1
650 SUPPOSITORY RECTAL EVERY 6 HOURS PRN
Status: DISCONTINUED | OUTPATIENT
Start: 2024-11-14 | End: 2024-11-19 | Stop reason: HOSPADM

## 2024-11-14 RX ORDER — POTASSIUM CHLORIDE 1500 MG/1
40 TABLET, EXTENDED RELEASE ORAL PRN
Status: DISCONTINUED | OUTPATIENT
Start: 2024-11-14 | End: 2024-11-19 | Stop reason: HOSPADM

## 2024-11-14 RX ADMIN — PHENYTOIN SODIUM 100 MG: 100 CAPSULE, EXTENDED RELEASE ORAL at 10:24

## 2024-11-14 RX ADMIN — OXYCODONE 5 MG: 5 TABLET ORAL at 15:29

## 2024-11-14 RX ADMIN — SODIUM CHLORIDE, PRESERVATIVE FREE 10 ML: 5 INJECTION INTRAVENOUS at 21:38

## 2024-11-14 RX ADMIN — PHENYTOIN SODIUM 100 MG: 100 CAPSULE, EXTENDED RELEASE ORAL at 21:13

## 2024-11-14 RX ADMIN — WATER 1000 MG: 1 INJECTION INTRAMUSCULAR; INTRAVENOUS; SUBCUTANEOUS at 21:38

## 2024-11-14 RX ADMIN — OXYCODONE 5 MG: 5 TABLET ORAL at 16:48

## 2024-11-14 RX ADMIN — LORAZEPAM 1 MG: 2 INJECTION INTRAMUSCULAR; INTRAVENOUS at 18:32

## 2024-11-14 RX ADMIN — ACETAMINOPHEN 650 MG: 325 TABLET ORAL at 10:22

## 2024-11-14 RX ADMIN — LORAZEPAM 1 MG: 2 INJECTION INTRAMUSCULAR; INTRAVENOUS at 23:36

## 2024-11-14 RX ADMIN — SODIUM CHLORIDE, PRESERVATIVE FREE 10 ML: 5 INJECTION INTRAVENOUS at 21:13

## 2024-11-14 ASSESSMENT — PAIN DESCRIPTION - ORIENTATION
ORIENTATION: UPPER;LEFT;RIGHT
ORIENTATION: LEFT
ORIENTATION: LEFT

## 2024-11-14 ASSESSMENT — PAIN SCALES - GENERAL
PAINLEVEL_OUTOF10: 10
PAINLEVEL_OUTOF10: 6
PAINLEVEL_OUTOF10: 2
PAINLEVEL_OUTOF10: 4
PAINLEVEL_OUTOF10: 4

## 2024-11-14 ASSESSMENT — PAIN DESCRIPTION - DESCRIPTORS
DESCRIPTORS: SORE
DESCRIPTORS: ACHING;SORE

## 2024-11-14 ASSESSMENT — PAIN DESCRIPTION - FREQUENCY
FREQUENCY: INTERMITTENT
FREQUENCY: INTERMITTENT

## 2024-11-14 ASSESSMENT — PAIN DESCRIPTION - PAIN TYPE
TYPE: ACUTE PAIN
TYPE: ACUTE PAIN

## 2024-11-14 ASSESSMENT — PAIN DESCRIPTION - ONSET
ONSET: ON-GOING
ONSET: ON-GOING

## 2024-11-14 ASSESSMENT — PAIN DESCRIPTION - LOCATION
LOCATION: LEG
LOCATION: HIP
LOCATION: LEG

## 2024-11-14 ASSESSMENT — PAIN - FUNCTIONAL ASSESSMENT
PAIN_FUNCTIONAL_ASSESSMENT: PREVENTS OR INTERFERES WITH MANY ACTIVE NOT PASSIVE ACTIVITIES
PAIN_FUNCTIONAL_ASSESSMENT: NONE - DENIES PAIN
PAIN_FUNCTIONAL_ASSESSMENT: PREVENTS OR INTERFERES SOME ACTIVE ACTIVITIES AND ADLS

## 2024-11-14 NOTE — ED NOTES
ED to inpatient nurses report      Chief Complaint:  Chief Complaint   Patient presents with    Fall     Present to ED from: Home    MOA:     LOC: alert and orientated to name, place, date  Mobility: Fully dependent  Oxygen Baseline: RA    Current needs required: RA     Code Status:   Prior    What abnormal results were found and what did you give/do to treat them? Fall, initial encounter; closed nondisplaced fracture of left acetabulum, unspecified portion of acetabulum, initial encounter; fracture of multiple pubic rami, left, closed, initial encounter; pelvic hematoma in female; unable to care for self    Any procedures or intervention occur? None    Mental Status:  Level of Consciousness: Alert (0)    Psych Assessment:        Vitals:  Patient Vitals for the past 24 hrs:   BP Temp Temp src Pulse Resp SpO2 Height Weight   11/13/24 2315 (!) 160/84 -- -- 79 14 94 % -- --   11/13/24 2238 (!) 133/55 -- -- 65 14 94 % -- --   11/13/24 2145 126/64 -- -- 67 14 94 % -- --   11/13/24 2041 138/69 -- -- 74 15 94 % -- --   11/13/24 2016 131/69 98.1 °F (36.7 °C) Oral 73 15 94 % 1.676 m (5' 6\") 54.4 kg (120 lb)        LDAs:      Ambulatory Status:  Presents to emergency department  because of falls (Syncope, seizure, or loss of consciousness): Yes, Age > 70: Yes, Altered Mental Status, Intoxication with alcohol or substance confusion (Disorientation, impaired judgment, poor safety awaremess, or inability to follow instructions): No, Impaired Mobility: Ambulates or transfers with assistive devices or assistance; Unable to ambulate or transer.: Yes    Diagnosis:  DISPOSITION Decision To Admit 11/13/2024 11:51:41 PM   Final diagnoses:   Fall, initial encounter   Closed nondisplaced fracture of left acetabulum, unspecified portion of acetabulum, initial encounter (HCC)   Fracture of multiple pubic rami, left, closed, initial encounter (HCC)   Pelvic hematoma in female   Unable to care for self        Consults:  None     Pain

## 2024-11-14 NOTE — ED TRIAGE NOTES
Pt presents to ED via EMS from home with complaints of a fall. Pt states she was walking on the tile floor in her kitchen and slipped on something. EMS reports being at pt's house ~1 hour helping her, and reports pt asking them to stay due to not wanting to be alone over night. Pt denies hitting her head or any LOC, as well as any complaints of pain. Pt states she is ready to go home. Pt is A&Ox4, respirations equal and unlabored, VSS.

## 2024-11-14 NOTE — CONSULTS
Orthopaedic Consult  Patient:  Estelita Sosa  YOB: 1934  MRN: [id number]     Acct: [id number]    PCP: Darrick Acharya MD  Date of Admission: 11/13/2024  Date of Service: Pt seen/examined on 11/14/2024     Chief Complaint: L hip pain  History Of Present Illness: 89 y.o. female who presents with L hip pain after an unwitnessed fall in her home. She was ambulating in the kitchen without a walker, mechanical, did not hit head, no loc, inability to get off the floor prompting EMS call and ED visit. Imaging in the ED revealed a L pubic rami and acetabulum fracture for which orthopaedics was consulted. She denies any pain at this time, ambulating does cause some discomfort to the left groin, otherwise denies any pain with palpation Rom, no paresthesias or weakness. No other msk complaints. Baseline walker dependent.     Patient is not a very reliable historian at this time, unsure if medication induced, she is oriented to self, able to relay events of fall, unclear on year and events of admission. She is rather sporadic with conversation and is having word findings difficulties. States she lives alone with a family friend who checks on her routinely, did not allude to daily care givers as noted in other notes of provider.       Past Medical History:        Diagnosis Date    Drug-induced polyneuropathy (HCC) 9/23/2016    Fall due to slipping on ice or snow 07/07/2014    History of colon cancer 2004    well diff. andeno    Hyperplastic colon polyp     Irritable bowel syndrome     Osteoarthritis     Osteoporosis     Right leg DVT (HCC) 06/2018    Seizures ([facility]) 1969 1988 last seizure    Sprain and strain right ankle       Past Surgical History:        Procedure Laterality Date    CATARACT REMOVAL  2000    right  pajka    CATARACT REMOVAL  2008    left and right    COLECTOMY  2004    low anterior resection    COLONOSCOPY  2011    colon ca 2004    neidic    HYSTERECTOMY (CERVIX STATUS UNKNOWN)

## 2024-11-14 NOTE — ED NOTES
Pt's caregiver call this RN to bedside to express concerns about pt's safety at home, Provider notified. Pt placed on external catheter. Provider at bedside. VSS.

## 2024-11-14 NOTE — CARE COORDINATION
DISCHARGE PLANNING EVALUATION  11/14/24, 11:17 AM EST    Reason for Referral: discharge plan  Decision Maker: makes own decisions, has POA for health care and for finances   Current Services: none   New Services Requested: possible HH, reluctant to consider snf at this time  Family/ Social/ Home environment:  Estelita lives at home alone, has friends who assist at home, provide transportation, grocery shopping, housekeeping  Payment Source:medicare   Transportation at Discharge: undetermined   Post-acute (PAC) provider list was provided to patient. Patient was informed of their freedom to choose PAC provider. Discussed and offered to show the patient the relevant PAC Providers quality and resource use measures on Medicare Compare web site via computer based on patient's goals of care and treatment preferences. Questions regarding selection process were answered.      Teach Back Method used with patient and her POA, Wilver Mcpherson, regarding care plan and discharge plan  Patient and POA verbalized understanding of the plan of care and contribute to goal setting.       Patient preferences and discharge plan:  spoke with Estelita and her POA, Wilver Mcpherson (first POA for healthcare is Jameson Carrera, who is unavailable at this time).  Discussed possible need for snf for rehab, considering her injuries and living situation without 24 help.  Estelita has limited family, her nieces are her only family members and they live out of state.   POA is encouraging snf for rehab, patient is reluctant to discuss this at this time.  List provided and reviewed.       Electronically signed by NADIRA Cooper on 11/14/2024 at 11:17 AM

## 2024-11-14 NOTE — FLOWSHEET NOTE
11/14/24 1523   Treatment Team Notification   Reason for Communication Review case   Name of Team Member Notified Vibha Joseph   Treatment Team Role Attending Provider   Method of Communication Secure Message   Response See orders   Notification Time 1523     Notified patient is having significant pain, especially with any movement. PT and OT were unable to get patient out of bed due to her crying and yelling out in pain. Patient moans and tries to prevent staff from repositioning/ turning her. She currently only has Tylenol on. Asked if she could have something else for pain. PRN oxycodone ordered.

## 2024-11-14 NOTE — ED PROVIDER NOTES
[facility] ORTHOPEDICS [facility]K      EMERGENCY MEDICINE     Pt Name: Estelita Sosa  MRN: [id number]  Birthdate 10/4/1934  Date of evaluation: 11/13/2024  Provider: JULIO Campbell CNP    CHIEF COMPLAINT       Chief Complaint   Patient presents with    Fall     HISTORY OF PRESENT ILLNESS   Estelita Sosa is a pleasant 89 y.o. female who presents to the emergency department from home with c/o a fall.  The patient fell tonight at home.  She lives alone.  She denies hitting her head but states her left leg hurts.  Also her right lower leg and foot.  Patient's caregiver's arrived as well.  They informed us that she lived alone and they come in throughout the day.  The patient's next of kin and POA all live out of state.  They advised that the patient has fallen multiple times recently and won't bathe or go to the doctor.  Reports have been made to adult protective services but caregivers are unsure of the outcome.  Patient is not on blood thinners.        History is obtained from:  patient  PASTMEDICAL HISTORY     Past Medical History:   Diagnosis Date    Drug-induced polyneuropathy ([facility]) 9/23/2016    Fall due to slipping on ice or snow 07/07/2014    History of colon cancer 2004    well diff. 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

## 2024-11-14 NOTE — ED NOTES
Pt and caregiver updated on care plan, verbal understanding given. No needs expressed at this time. Vital signs assessed.

## 2024-11-14 NOTE — H&P
reasonably achievable.    XR HIP 2-3 VW W PELVIS LEFT    Result Date: 11/13/2024  3 view, pelvis and left hip Comparison: CR/SR - XR HIP RIGHT (2-3 VIEWS) - 02/14/2018 12:19 PM EST CR/SR - XR HIP 2-3 VW W PELVIS RIGHT - 02/12/2018 11:16 PM EST Findings: Diffuse osteopenia. Subtle disruption along the iliopectineal line on the left, concerning for acetabular fracture. Redemonstrated right total hip arthroplasty, visualized hardware appears intact. Heterotopic ossification with deformity along the femoral neck. Mildly diffuse soft tissue edema. Vascular calcifications.     Possible left acetabular fracture. This document has been electronically signed by: Antonino Gaytan MD on 11/13/2024 10:08 PM    XR FOOT RIGHT (MIN 3 VIEWS)    Result Date: 11/13/2024  4 view right foot Comparison: None Findings: Osteopenia. No obvious acute fracture or malalignment. Diffuse hyperflexion along the 2nd-5th PIP joints, limits evaluation of the distal digits. No ankle effusion. No radiopaque foreign body. Diffuse soft tissue edema. Vascular calcifications.     No obvious acute fracture or malalignment. This document has been electronically signed by: Antonino Gaytan MD on 11/13/2024 10:07 PM    XR CHEST PORTABLE    Result Date: 11/13/2024  1 view chest x-ray Comparison: None Findings: Mild left lower lobe atelectasis. No significant pleural effusion or pneumothorax. Prominent cardiac silhouette. No acute fracture.     Mild left lower lobe atelectasis. This document has been electronically signed by: Antonino Gaytan MD on 11/13/2024 10:06 PM    CT CERVICAL SPINE WO CONTRAST    Result Date: 11/13/2024  EXAM: CT Cervical Spine Without Intravenous Contrast COMPARISON: No relevant prior studies available. FINDINGS: ARTIFACTS: Motion and streak artifact limit evaluation. VERTEBRAE: Exaggeration of the cervical lordosis. Osteopenia. Multilevel spondylosis with osteophytosis, uncovertebral hypertrophy, facet arthropathy and degenerative disc

## 2024-11-14 NOTE — CARE COORDINATION
Case Management Assessment Initial Evaluation    Date/Time of Evaluation: 11/14/2024 8:22 AM  Assessment Completed by: Mikala Gould RN    If patient is discharged prior to next notation, then this note serves as note for discharge by case management.    Patient Name: Estelita Sosa                   YOB: 1934  Diagnosis: Pelvic hematoma in female [N94.89]  Fall, initial encounter [W19.XXXA]  Fracture of multiple pubic rami, left, closed, initial encounter ([facility]) [S32.592A]  Closed fracture of inferior pubic ramus, left, initial encounter ([facility]) [S32.592A]  Closed nondisplaced fracture of left acetabulum, unspecified portion of acetabulum, initial encounter ([facility]) [S32.402A]  Unable to care for self [Z78.9]                   Date / Time: 11/13/2024  8:09 PM  Location: [address] [place] [address]     Patient Admission Status: Inpatient   Readmission Risk Low 0-14, Mod 15-19), High > 20: Readmission Risk Score: 11.8    Current PCP: Darrick Acharya MD  Health Care Decision Makers:   Primary Decision Maker: Jameson Carrera - Friend - [phone]    Secondary Decision Maker: MICHAEL Suazo - Friend - [phone]    Additional Case Management Notes: to ED per squad with c/o a fall.  The patient fell tonight at home, lives alone, denies hitting her head but states her left leg hurts.     Procedures: none    Imaging:   CT pelvis:   1. Left inferior pubic ramus fracture.   2. Left acetabular fractures.   3. Left-sided pelvic sidewall soft tissue edema/hematoma with trace   extraperitoneal hemorrhage.   Xray right foot: Diffuse soft tissue edema.   Vascular calcifications.   PCXR: Mild left lower lobe atelectasis.     Patient Goals/Plan/Treatment Preferences: Met with Estelita and a friend So; pt lives at [facility] and uses walker in her condo. She agrees to go to SNF; SW consulted.             11/14/24 9431   Service Assessment   Patient Orientation Person   Cognition Short Term Memory Deficit   History Provided
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
## 2024-11-15 LAB
ANION GAP SERPL CALC-SCNC: 11 MEQ/L (ref 8–16)
BUN SERPL-MCNC: 21 MG/DL (ref 7–22)
CA-I BLD ISE-SCNC: 1.18 MMOL/L (ref 1.12–1.32)
CALCIUM SERPL-MCNC: 8.2 MG/DL (ref 8.5–10.5)
CHLORIDE SERPL-SCNC: 109 MEQ/L (ref 98–111)
CO2 SERPL-SCNC: 20 MEQ/L (ref 23–33)
CREAT SERPL-MCNC: 0.8 MG/DL (ref 0.4–1.2)
DEPRECATED RDW RBC AUTO: 58.8 FL (ref 35–45)
ERYTHROCYTE [DISTWIDTH] IN BLOOD BY AUTOMATED COUNT: 14.6 % (ref 11.5–14.5)
GFR SERPL CREATININE-BSD FRML MDRD: 70 ML/MIN/1.73M2
GLUCOSE SERPL-MCNC: 103 MG/DL (ref 70–108)
HCT VFR BLD AUTO: 33.8 % (ref 37–47)
HCT VFR BLD AUTO: 34.5 % (ref 37–47)
HCT VFR BLD AUTO: 35.3 % (ref 37–47)
HGB BLD-MCNC: 10.6 GM/DL (ref 12–16)
HGB BLD-MCNC: 10.8 GM/DL (ref 12–16)
HGB BLD-MCNC: 11.4 GM/DL (ref 12–16)
MCH RBC QN AUTO: 34 PG (ref 26–33)
MCHC RBC AUTO-ENTMCNC: 30.7 GM/DL (ref 32.2–35.5)
MCV RBC AUTO: 110.6 FL (ref 81–99)
PATHOLOGIST REVIEW: ABNORMAL
PLATELET # BLD AUTO: 76 THOU/MM3 (ref 130–400)
PMV BLD AUTO: 9.3 FL (ref 9.4–12.4)
POTASSIUM SERPL-SCNC: 4.2 MEQ/L (ref 3.5–5.2)
RBC # BLD AUTO: 3.12 MILL/MM3 (ref 4.2–5.4)
SCAN OF BLOOD SMEAR: NORMAL
SODIUM SERPL-SCNC: 140 MEQ/L (ref 135–145)
WBC # BLD AUTO: 6.4 THOU/MM3 (ref 4.8–10.8)

## 2024-11-15 PROCEDURE — 1200000000 HC SEMI PRIVATE

## 2024-11-15 PROCEDURE — 36415 COLL VENOUS BLD VENIPUNCTURE: CPT

## 2024-11-15 PROCEDURE — 6370000000 HC RX 637 (ALT 250 FOR IP)

## 2024-11-15 PROCEDURE — 82330 ASSAY OF CALCIUM: CPT

## 2024-11-15 PROCEDURE — 85018 HEMOGLOBIN: CPT

## 2024-11-15 PROCEDURE — 85027 COMPLETE CBC AUTOMATED: CPT

## 2024-11-15 PROCEDURE — 97110 THERAPEUTIC EXERCISES: CPT

## 2024-11-15 PROCEDURE — 85014 HEMATOCRIT: CPT

## 2024-11-15 PROCEDURE — 1200000003 HC TELEMETRY R&B

## 2024-11-15 PROCEDURE — 97530 THERAPEUTIC ACTIVITIES: CPT

## 2024-11-15 PROCEDURE — 99233 SBSQ HOSP IP/OBS HIGH 50: CPT | Performed by: STUDENT IN AN ORGANIZED HEALTH CARE EDUCATION/TRAINING PROGRAM

## 2024-11-15 PROCEDURE — 80048 BASIC METABOLIC PNL TOTAL CA: CPT

## 2024-11-15 PROCEDURE — 2580000003 HC RX 258

## 2024-11-15 RX ADMIN — ACETAMINOPHEN 650 MG: 325 TABLET ORAL at 09:13

## 2024-11-15 RX ADMIN — SODIUM CHLORIDE, PRESERVATIVE FREE 10 ML: 5 INJECTION INTRAVENOUS at 09:20

## 2024-11-15 RX ADMIN — SODIUM CHLORIDE, PRESERVATIVE FREE 10 ML: 5 INJECTION INTRAVENOUS at 19:50

## 2024-11-15 RX ADMIN — ACETAMINOPHEN 650 MG: 325 TABLET ORAL at 19:51

## 2024-11-15 RX ADMIN — PHENYTOIN SODIUM 100 MG: 100 CAPSULE, EXTENDED RELEASE ORAL at 19:51

## 2024-11-15 RX ADMIN — PHENYTOIN SODIUM 100 MG: 100 CAPSULE, EXTENDED RELEASE ORAL at 09:14

## 2024-11-15 ASSESSMENT — PAIN SCALES - WONG BAKER
WONGBAKER_NUMERICALRESPONSE: HURTS A LITTLE BIT
WONGBAKER_NUMERICALRESPONSE: NO HURT
WONGBAKER_NUMERICALRESPONSE: NO HURT

## 2024-11-15 ASSESSMENT — PAIN DESCRIPTION - DESCRIPTORS
DESCRIPTORS: ACHING

## 2024-11-15 ASSESSMENT — PAIN DESCRIPTION - LOCATION
LOCATION: NECK
LOCATION: PERINEUM
LOCATION: HIP

## 2024-11-15 ASSESSMENT — PAIN DESCRIPTION - ORIENTATION
ORIENTATION: LEFT
ORIENTATION: MID
ORIENTATION: MID

## 2024-11-15 ASSESSMENT — PAIN DESCRIPTION - PAIN TYPE: TYPE: ACUTE PAIN

## 2024-11-15 ASSESSMENT — PAIN SCALES - GENERAL
PAINLEVEL_OUTOF10: 0
PAINLEVEL_OUTOF10: 2
PAINLEVEL_OUTOF10: 0
PAINLEVEL_OUTOF10: 2
PAINLEVEL_OUTOF10: 5

## 2024-11-15 ASSESSMENT — PAIN - FUNCTIONAL ASSESSMENT
PAIN_FUNCTIONAL_ASSESSMENT: ACTIVITIES ARE NOT PREVENTED
PAIN_FUNCTIONAL_ASSESSMENT: PREVENTS OR INTERFERES SOME ACTIVE ACTIVITIES AND ADLS

## 2024-11-15 ASSESSMENT — PAIN DESCRIPTION - ONSET: ONSET: ON-GOING

## 2024-11-15 ASSESSMENT — PAIN DESCRIPTION - FREQUENCY: FREQUENCY: INTERMITTENT

## 2024-11-15 NOTE — CARE COORDINATION
11/15/24, 1:37 PM EST    DISCHARGE PLANNING EVALUATION    Spoke with Estelita, her caregiver Hailey and her friend, Yuval.   Estelita is still hesitant to consider snf, is confused today.  Wilver AYALA, is not here at present, has snf list and is considering options.  Will not need precert for snf, once preferences are identified.  Friends are encouraging snf for rehab, and discussing possible options.

## 2024-11-15 NOTE — CARE COORDINATION
Patient has been more alert this shift and as the shift progressed she became more oriented to her surrounds and situation. Patient received a lot of visitors this shift which as a comfort to her. Patient only complained of pain when repositioned and patient would only take tylenol once this shift.

## 2024-11-16 LAB
ANION GAP SERPL CALC-SCNC: 8 MEQ/L (ref 8–16)
BUN SERPL-MCNC: 22 MG/DL (ref 7–22)
CALCIUM SERPL-MCNC: 8.5 MG/DL (ref 8.5–10.5)
CHLORIDE SERPL-SCNC: 107 MEQ/L (ref 98–111)
CO2 SERPL-SCNC: 22 MEQ/L (ref 23–33)
CREAT SERPL-MCNC: 0.8 MG/DL (ref 0.4–1.2)
DEPRECATED RDW RBC AUTO: 56.7 FL (ref 35–45)
ERYTHROCYTE [DISTWIDTH] IN BLOOD BY AUTOMATED COUNT: 14.5 % (ref 11.5–14.5)
GFR SERPL CREATININE-BSD FRML MDRD: 70 ML/MIN/1.73M2
GLUCOSE SERPL-MCNC: 95 MG/DL (ref 70–108)
HCT VFR BLD AUTO: 32.6 % (ref 37–47)
HCT VFR BLD AUTO: 34.1 % (ref 37–47)
HGB BLD-MCNC: 10.5 GM/DL (ref 12–16)
HGB BLD-MCNC: 11 GM/DL (ref 12–16)
MCH RBC QN AUTO: 34 PG (ref 26–33)
MCHC RBC AUTO-ENTMCNC: 32.2 GM/DL (ref 32.2–35.5)
MCV RBC AUTO: 105.5 FL (ref 81–99)
PLATELET # BLD AUTO: 79 THOU/MM3 (ref 130–400)
PMV BLD AUTO: 9.7 FL (ref 9.4–12.4)
POTASSIUM SERPL-SCNC: 4.4 MEQ/L (ref 3.5–5.2)
RBC # BLD AUTO: 3.09 MILL/MM3 (ref 4.2–5.4)
SODIUM SERPL-SCNC: 137 MEQ/L (ref 135–145)
WBC # BLD AUTO: 6 THOU/MM3 (ref 4.8–10.8)

## 2024-11-16 PROCEDURE — 6360000002 HC RX W HCPCS: Performed by: STUDENT IN AN ORGANIZED HEALTH CARE EDUCATION/TRAINING PROGRAM

## 2024-11-16 PROCEDURE — 6360000002 HC RX W HCPCS: Performed by: PHYSICIAN ASSISTANT

## 2024-11-16 PROCEDURE — 1200000003 HC TELEMETRY R&B

## 2024-11-16 PROCEDURE — 2580000003 HC RX 258

## 2024-11-16 PROCEDURE — 80048 BASIC METABOLIC PNL TOTAL CA: CPT

## 2024-11-16 PROCEDURE — 85014 HEMATOCRIT: CPT

## 2024-11-16 PROCEDURE — 2580000003 HC RX 258: Performed by: STUDENT IN AN ORGANIZED HEALTH CARE EDUCATION/TRAINING PROGRAM

## 2024-11-16 PROCEDURE — 36415 COLL VENOUS BLD VENIPUNCTURE: CPT

## 2024-11-16 PROCEDURE — 1200000000 HC SEMI PRIVATE

## 2024-11-16 PROCEDURE — 85018 HEMOGLOBIN: CPT

## 2024-11-16 PROCEDURE — 85027 COMPLETE CBC AUTOMATED: CPT

## 2024-11-16 PROCEDURE — 99233 SBSQ HOSP IP/OBS HIGH 50: CPT | Performed by: STUDENT IN AN ORGANIZED HEALTH CARE EDUCATION/TRAINING PROGRAM

## 2024-11-16 PROCEDURE — 6370000000 HC RX 637 (ALT 250 FOR IP)

## 2024-11-16 RX ORDER — HALOPERIDOL 5 MG/ML
1 INJECTION INTRAMUSCULAR ONCE
Status: COMPLETED | OUTPATIENT
Start: 2024-11-16 | End: 2024-11-16

## 2024-11-16 RX ORDER — HALOPERIDOL 5 MG/ML
5 INJECTION INTRAMUSCULAR ONCE
Status: COMPLETED | OUTPATIENT
Start: 2024-11-16 | End: 2024-11-17

## 2024-11-16 RX ADMIN — WATER 1000 MG: 1 INJECTION INTRAMUSCULAR; INTRAVENOUS; SUBCUTANEOUS at 11:57

## 2024-11-16 RX ADMIN — HALOPERIDOL LACTATE 1 MG: 5 INJECTION, SOLUTION INTRAMUSCULAR at 17:46

## 2024-11-16 RX ADMIN — MORPHINE SULFATE 2 MG: 2 INJECTION, SOLUTION INTRAMUSCULAR; INTRAVENOUS at 20:07

## 2024-11-16 RX ADMIN — ACETAMINOPHEN 650 MG: 325 TABLET ORAL at 20:07

## 2024-11-16 RX ADMIN — SODIUM CHLORIDE, PRESERVATIVE FREE 10 ML: 5 INJECTION INTRAVENOUS at 20:07

## 2024-11-16 RX ADMIN — SODIUM CHLORIDE, PRESERVATIVE FREE 10 ML: 5 INJECTION INTRAVENOUS at 09:31

## 2024-11-16 RX ADMIN — PHENYTOIN SODIUM 100 MG: 100 CAPSULE, EXTENDED RELEASE ORAL at 09:31

## 2024-11-16 RX ADMIN — PHENYTOIN SODIUM 100 MG: 100 CAPSULE, EXTENDED RELEASE ORAL at 20:07

## 2024-11-16 ASSESSMENT — PAIN SCALES - GENERAL
PAINLEVEL_OUTOF10: 0
PAINLEVEL_OUTOF10: 6
PAINLEVEL_OUTOF10: 7

## 2024-11-16 ASSESSMENT — PAIN DESCRIPTION - PAIN TYPE: TYPE: ACUTE PAIN

## 2024-11-16 ASSESSMENT — PAIN SCALES - WONG BAKER
WONGBAKER_NUMERICALRESPONSE: NO HURT
WONGBAKER_NUMERICALRESPONSE: HURTS EVEN MORE
WONGBAKER_NUMERICALRESPONSE: NO HURT

## 2024-11-16 ASSESSMENT — PAIN - FUNCTIONAL ASSESSMENT: PAIN_FUNCTIONAL_ASSESSMENT: PREVENTS OR INTERFERES SOME ACTIVE ACTIVITIES AND ADLS

## 2024-11-16 ASSESSMENT — PAIN DESCRIPTION - LOCATION: LOCATION: BACK

## 2024-11-16 ASSESSMENT — PAIN DESCRIPTION - FREQUENCY: FREQUENCY: INTERMITTENT

## 2024-11-16 ASSESSMENT — PAIN DESCRIPTION - ORIENTATION: ORIENTATION: LOWER

## 2024-11-16 ASSESSMENT — PAIN DESCRIPTION - DESCRIPTORS: DESCRIPTORS: ACHING;DISCOMFORT

## 2024-11-16 ASSESSMENT — PAIN DESCRIPTION - ONSET: ONSET: ON-GOING

## 2024-11-17 LAB
ANION GAP SERPL CALC-SCNC: 10 MEQ/L (ref 8–16)
BACTERIA UR CULT: ABNORMAL
BUN SERPL-MCNC: 22 MG/DL (ref 7–22)
CA-I BLD ISE-SCNC: 1.19 MMOL/L (ref 1.12–1.32)
CALCIUM SERPL-MCNC: 8.3 MG/DL (ref 8.5–10.5)
CHLORIDE SERPL-SCNC: 107 MEQ/L (ref 98–111)
CO2 SERPL-SCNC: 22 MEQ/L (ref 23–33)
CREAT SERPL-MCNC: 0.8 MG/DL (ref 0.4–1.2)
DEPRECATED RDW RBC AUTO: 54.4 FL (ref 35–45)
EKG ATRIAL RATE: 90 BPM
EKG P AXIS: 92 DEGREES
EKG P-R INTERVAL: 172 MS
EKG Q-T INTERVAL: 374 MS
EKG QRS DURATION: 132 MS
EKG QTC CALCULATION (BAZETT): 457 MS
EKG R AXIS: -22 DEGREES
EKG T AXIS: 100 DEGREES
EKG VENTRICULAR RATE: 90 BPM
ERYTHROCYTE [DISTWIDTH] IN BLOOD BY AUTOMATED COUNT: 14 % (ref 11.5–14.5)
GFR SERPL CREATININE-BSD FRML MDRD: 70 ML/MIN/1.73M2
GLUCOSE SERPL-MCNC: 102 MG/DL (ref 70–108)
HCT VFR BLD AUTO: 31.8 % (ref 37–47)
HCT VFR BLD AUTO: 32 % (ref 37–47)
HGB BLD-MCNC: 10.3 GM/DL (ref 12–16)
HGB BLD-MCNC: 10.4 GM/DL (ref 12–16)
MCH RBC QN AUTO: 33.9 PG (ref 26–33)
MCHC RBC AUTO-ENTMCNC: 32.2 GM/DL (ref 32.2–35.5)
MCV RBC AUTO: 105.3 FL (ref 81–99)
ORGANISM: ABNORMAL
PLATELET # BLD AUTO: 89 THOU/MM3 (ref 130–400)
PMV BLD AUTO: 10.3 FL (ref 9.4–12.4)
POTASSIUM SERPL-SCNC: 4.1 MEQ/L (ref 3.5–5.2)
RBC # BLD AUTO: 3.04 MILL/MM3 (ref 4.2–5.4)
SCAN OF BLOOD SMEAR: NORMAL
SODIUM SERPL-SCNC: 139 MEQ/L (ref 135–145)
WBC # BLD AUTO: 5.5 THOU/MM3 (ref 4.8–10.8)

## 2024-11-17 PROCEDURE — 85018 HEMOGLOBIN: CPT

## 2024-11-17 PROCEDURE — 36415 COLL VENOUS BLD VENIPUNCTURE: CPT

## 2024-11-17 PROCEDURE — 6370000000 HC RX 637 (ALT 250 FOR IP): Performed by: PHYSICIAN ASSISTANT

## 2024-11-17 PROCEDURE — 99232 SBSQ HOSP IP/OBS MODERATE 35: CPT | Performed by: STUDENT IN AN ORGANIZED HEALTH CARE EDUCATION/TRAINING PROGRAM

## 2024-11-17 PROCEDURE — 2580000003 HC RX 258: Performed by: STUDENT IN AN ORGANIZED HEALTH CARE EDUCATION/TRAINING PROGRAM

## 2024-11-17 PROCEDURE — 93005 ELECTROCARDIOGRAM TRACING: CPT | Performed by: STUDENT IN AN ORGANIZED HEALTH CARE EDUCATION/TRAINING PROGRAM

## 2024-11-17 PROCEDURE — 93010 ELECTROCARDIOGRAM REPORT: CPT | Performed by: INTERNAL MEDICINE

## 2024-11-17 PROCEDURE — 1200000000 HC SEMI PRIVATE

## 2024-11-17 PROCEDURE — 2580000003 HC RX 258

## 2024-11-17 PROCEDURE — 82330 ASSAY OF CALCIUM: CPT

## 2024-11-17 PROCEDURE — 6370000000 HC RX 637 (ALT 250 FOR IP)

## 2024-11-17 PROCEDURE — 97530 THERAPEUTIC ACTIVITIES: CPT

## 2024-11-17 PROCEDURE — 6360000002 HC RX W HCPCS: Performed by: STUDENT IN AN ORGANIZED HEALTH CARE EDUCATION/TRAINING PROGRAM

## 2024-11-17 PROCEDURE — 85014 HEMATOCRIT: CPT

## 2024-11-17 PROCEDURE — 85027 COMPLETE CBC AUTOMATED: CPT

## 2024-11-17 PROCEDURE — 6360000002 HC RX W HCPCS: Performed by: PHYSICIAN ASSISTANT

## 2024-11-17 PROCEDURE — 80048 BASIC METABOLIC PNL TOTAL CA: CPT

## 2024-11-17 RX ORDER — HALOPERIDOL 5 MG/ML
5 INJECTION INTRAMUSCULAR EVERY 6 HOURS PRN
Status: CANCELLED | OUTPATIENT
Start: 2024-11-17

## 2024-11-17 RX ORDER — HALOPERIDOL 5 MG/ML
2 INJECTION INTRAMUSCULAR ONCE
Status: COMPLETED | OUTPATIENT
Start: 2024-11-17 | End: 2024-11-17

## 2024-11-17 RX ADMIN — MUSCLE RUB CREAM: 100; 150 CREAM TOPICAL at 13:27

## 2024-11-17 RX ADMIN — PHENYTOIN SODIUM 100 MG: 100 CAPSULE, EXTENDED RELEASE ORAL at 09:12

## 2024-11-17 RX ADMIN — ACETAMINOPHEN 650 MG: 325 TABLET ORAL at 13:25

## 2024-11-17 RX ADMIN — PHENYTOIN SODIUM 100 MG: 100 CAPSULE, EXTENDED RELEASE ORAL at 20:28

## 2024-11-17 RX ADMIN — HALOPERIDOL LACTATE 2 MG: 5 INJECTION, SOLUTION INTRAMUSCULAR at 20:29

## 2024-11-17 RX ADMIN — SODIUM CHLORIDE, PRESERVATIVE FREE 10 ML: 5 INJECTION INTRAVENOUS at 20:29

## 2024-11-17 RX ADMIN — WATER 1000 MG: 1 INJECTION INTRAMUSCULAR; INTRAVENOUS; SUBCUTANEOUS at 11:16

## 2024-11-17 RX ADMIN — ACETAMINOPHEN 650 MG: 325 TABLET ORAL at 20:28

## 2024-11-17 RX ADMIN — SODIUM CHLORIDE, PRESERVATIVE FREE 10 ML: 5 INJECTION INTRAVENOUS at 09:12

## 2024-11-17 RX ADMIN — HALOPERIDOL LACTATE 5 MG: 5 INJECTION, SOLUTION INTRAMUSCULAR at 00:37

## 2024-11-17 ASSESSMENT — PAIN SCALES - WONG BAKER
WONGBAKER_NUMERICALRESPONSE: HURTS A LITTLE BIT

## 2024-11-17 ASSESSMENT — PAIN DESCRIPTION - DESCRIPTORS
DESCRIPTORS: ACHING;DISCOMFORT
DESCRIPTORS: ACHING;DISCOMFORT
DESCRIPTORS: ACHING

## 2024-11-17 ASSESSMENT — PAIN DESCRIPTION - PAIN TYPE
TYPE: ACUTE PAIN
TYPE: ACUTE PAIN

## 2024-11-17 ASSESSMENT — PAIN SCALES - GENERAL
PAINLEVEL_OUTOF10: 1
PAINLEVEL_OUTOF10: 0
PAINLEVEL_OUTOF10: 2
PAINLEVEL_OUTOF10: 3
PAINLEVEL_OUTOF10: 0
PAINLEVEL_OUTOF10: 3
PAINLEVEL_OUTOF10: 2
PAINLEVEL_OUTOF10: 0
PAINLEVEL_OUTOF10: 3

## 2024-11-17 ASSESSMENT — PAIN - FUNCTIONAL ASSESSMENT
PAIN_FUNCTIONAL_ASSESSMENT: PREVENTS OR INTERFERES SOME ACTIVE ACTIVITIES AND ADLS
PAIN_FUNCTIONAL_ASSESSMENT: PREVENTS OR INTERFERES SOME ACTIVE ACTIVITIES AND ADLS

## 2024-11-17 ASSESSMENT — PAIN DESCRIPTION - ORIENTATION
ORIENTATION: LOWER
ORIENTATION: LEFT

## 2024-11-17 ASSESSMENT — PAIN DESCRIPTION - ONSET
ONSET: ON-GOING
ONSET: ON-GOING

## 2024-11-17 ASSESSMENT — PAIN DESCRIPTION - LOCATION
LOCATION: HIP;LEG
LOCATION: BACK
LOCATION: KNEE

## 2024-11-17 ASSESSMENT — PAIN DESCRIPTION - FREQUENCY
FREQUENCY: INTERMITTENT
FREQUENCY: INTERMITTENT

## 2024-11-18 LAB
ANION GAP SERPL CALC-SCNC: 9 MEQ/L (ref 8–16)
BUN SERPL-MCNC: 24 MG/DL (ref 7–22)
CALCIUM SERPL-MCNC: 8.7 MG/DL (ref 8.5–10.5)
CHLORIDE SERPL-SCNC: 112 MEQ/L (ref 98–111)
CO2 SERPL-SCNC: 21 MEQ/L (ref 23–33)
CREAT SERPL-MCNC: 0.7 MG/DL (ref 0.4–1.2)
DEPRECATED RDW RBC AUTO: 55.2 FL (ref 35–45)
ERYTHROCYTE [DISTWIDTH] IN BLOOD BY AUTOMATED COUNT: 14.1 % (ref 11.5–14.5)
GFR SERPL CREATININE-BSD FRML MDRD: 82 ML/MIN/1.73M2
GLUCOSE SERPL-MCNC: 105 MG/DL (ref 70–108)
HCT VFR BLD AUTO: 32.2 % (ref 37–47)
HGB BLD-MCNC: 10.2 GM/DL (ref 12–16)
MCH RBC QN AUTO: 33.7 PG (ref 26–33)
MCHC RBC AUTO-ENTMCNC: 31.7 GM/DL (ref 32.2–35.5)
MCV RBC AUTO: 106.3 FL (ref 81–99)
PLATELET # BLD AUTO: 111 THOU/MM3 (ref 130–400)
PMV BLD AUTO: 9.6 FL (ref 9.4–12.4)
POTASSIUM SERPL-SCNC: 4.1 MEQ/L (ref 3.5–5.2)
RBC # BLD AUTO: 3.03 MILL/MM3 (ref 4.2–5.4)
SODIUM SERPL-SCNC: 142 MEQ/L (ref 135–145)
WBC # BLD AUTO: 4.9 THOU/MM3 (ref 4.8–10.8)

## 2024-11-18 PROCEDURE — 6360000002 HC RX W HCPCS: Performed by: STUDENT IN AN ORGANIZED HEALTH CARE EDUCATION/TRAINING PROGRAM

## 2024-11-18 PROCEDURE — 1200000000 HC SEMI PRIVATE

## 2024-11-18 PROCEDURE — 80048 BASIC METABOLIC PNL TOTAL CA: CPT

## 2024-11-18 PROCEDURE — 99232 SBSQ HOSP IP/OBS MODERATE 35: CPT | Performed by: PHYSICIAN ASSISTANT

## 2024-11-18 PROCEDURE — 97535 SELF CARE MNGMENT TRAINING: CPT

## 2024-11-18 PROCEDURE — 2580000003 HC RX 258: Performed by: STUDENT IN AN ORGANIZED HEALTH CARE EDUCATION/TRAINING PROGRAM

## 2024-11-18 PROCEDURE — 97530 THERAPEUTIC ACTIVITIES: CPT

## 2024-11-18 PROCEDURE — 36415 COLL VENOUS BLD VENIPUNCTURE: CPT

## 2024-11-18 PROCEDURE — 2580000003 HC RX 258

## 2024-11-18 PROCEDURE — 6370000000 HC RX 637 (ALT 250 FOR IP)

## 2024-11-18 PROCEDURE — 85027 COMPLETE CBC AUTOMATED: CPT

## 2024-11-18 PROCEDURE — 97110 THERAPEUTIC EXERCISES: CPT

## 2024-11-18 RX ADMIN — PHENYTOIN SODIUM 100 MG: 100 CAPSULE, EXTENDED RELEASE ORAL at 19:49

## 2024-11-18 RX ADMIN — PHENYTOIN SODIUM 100 MG: 100 CAPSULE, EXTENDED RELEASE ORAL at 09:57

## 2024-11-18 RX ADMIN — SODIUM CHLORIDE, PRESERVATIVE FREE 10 ML: 5 INJECTION INTRAVENOUS at 09:53

## 2024-11-18 RX ADMIN — WATER 1000 MG: 1 INJECTION INTRAMUSCULAR; INTRAVENOUS; SUBCUTANEOUS at 13:24

## 2024-11-18 RX ADMIN — SODIUM CHLORIDE, PRESERVATIVE FREE 10 ML: 5 INJECTION INTRAVENOUS at 19:50

## 2024-11-18 ASSESSMENT — PAIN SCALES - WONG BAKER
WONGBAKER_NUMERICALRESPONSE: NO HURT
WONGBAKER_NUMERICALRESPONSE: HURTS A LITTLE BIT
WONGBAKER_NUMERICALRESPONSE: NO HURT
WONGBAKER_NUMERICALRESPONSE: HURTS A LITTLE BIT

## 2024-11-18 ASSESSMENT — PATIENT HEALTH QUESTIONNAIRE - PHQ9
SUM OF ALL RESPONSES TO PHQ QUESTIONS 1-9: 1
SUM OF ALL RESPONSES TO PHQ9 QUESTIONS 1 & 2: 1
SUM OF ALL RESPONSES TO PHQ QUESTIONS 1-9: 1
2. FEELING DOWN, DEPRESSED OR HOPELESS: SEVERAL DAYS
1. LITTLE INTEREST OR PLEASURE IN DOING THINGS: NOT AT ALL

## 2024-11-18 ASSESSMENT — LIFESTYLE VARIABLES
HOW OFTEN DO YOU HAVE A DRINK CONTAINING ALCOHOL: NEVER
HOW MANY STANDARD DRINKS CONTAINING ALCOHOL DO YOU HAVE ON A TYPICAL DAY: PATIENT DOES NOT DRINK

## 2024-11-18 ASSESSMENT — PAIN SCALES - GENERAL: PAINLEVEL_OUTOF10: 0

## 2024-11-18 NOTE — PROCEDURES
PROCEDURE NOTE  Date: 11/17/2024   Name: Estelita Sosa  YOB: 1934    Procedures    12 lead EKG completed. Results placed in patients chart, nurse couldn't be located because of shift change

## 2024-11-18 NOTE — CARE COORDINATION
11/18/24, 1:17 PM EST    DISCHARGE PLANNING EVALUATION     Spoke with Pennie at [facility] and made referral as requested by LUCY Barba.  Received a call from Pennie that they have accepted patient and can accept tomorrow after telesitter is removed.  Called an updated Jameson of acceptance and possible discharge tomorrow.

## 2024-11-18 NOTE — DISCHARGE INSTR - COC
11/18/24 0958   Odor None 11/17/24 2025   Number of days: 0        Elimination:  Continence:   Bowel: Yes  Bladder: Yes  Urinary Catheter: Insertion Date: 11/19/24    Colostomy/Ileostomy/Ileal Conduit: No       Date of Last BM: 11/18/24    Intake/Output Summary (Last 24 hours) at 11/18/2024 1455  Last data filed at 11/18/2024 1435  Gross per 24 hour   Intake 440 ml   Output 525 ml   Net -85 ml     I/O last 3 completed shifts:  In: 1120 [P.O.:1120]  Out: 1525 [Urine:1525]    Safety Concerns:     History of Falls (last 30 days) and At Risk for Falls    Impairments/Disabilities:      None    Nutrition Therapy:  Current Nutrition Therapy:   - Oral Diet:  General    Routes of Feeding: Oral  Liquids: No Restrictions  Daily Fluid Restriction: no  Last Modified Barium Swallow with Video (Video Swallowing Test): not done    Treatments at the Time of Hospital Discharge:   Respiratory Treatments: ***  Oxygen Therapy:  is not on home oxygen therapy.  Ventilator:    - No ventilator support    Rehab Therapies: Physical Therapy and Occupational Therapy  Weight Bearing Status/Restrictions: No weight bearing restrictions  Other Medical Equipment (for information only, NOT a DME order):  walker and bedside commode  Other Treatments: ***    Patient's personal belongings (please select all that are sent with patient):  Liv    RN SIGNATURE:  Electronically signed by Dorothy Russell RN on 11/19/24 at 5:39 PM EST    CASE MANAGEMENT/SOCIAL WORK SECTION    Inpatient Status Date: 11/14/2024    Readmission Risk Assessment Score:  Readmission Risk              Risk of Unplanned Readmission:  11           Discharging to Facility/ Agency   Name: [facility]   Address: [place] [address]  Phone: [phone]  Fax: [phone]    Dialysis Facility (if applicable)   Name:  Address:  Dialysis Schedule:  Phone:  Fax:    / signature: Electronically signed by NADIRA Rosas on 11/18/24 at

## 2024-11-19 VITALS
OXYGEN SATURATION: 95 % | RESPIRATION RATE: 17 BRPM | WEIGHT: 120 LBS | TEMPERATURE: 98.1 F | HEIGHT: 66 IN | HEART RATE: 90 BPM | DIASTOLIC BLOOD PRESSURE: 70 MMHG | BODY MASS INDEX: 19.29 KG/M2 | SYSTOLIC BLOOD PRESSURE: 163 MMHG

## 2024-11-19 PROCEDURE — 6370000000 HC RX 637 (ALT 250 FOR IP)

## 2024-11-19 PROCEDURE — 6360000002 HC RX W HCPCS: Performed by: STUDENT IN AN ORGANIZED HEALTH CARE EDUCATION/TRAINING PROGRAM

## 2024-11-19 PROCEDURE — 97535 SELF CARE MNGMENT TRAINING: CPT

## 2024-11-19 PROCEDURE — 2580000003 HC RX 258

## 2024-11-19 PROCEDURE — 99239 HOSP IP/OBS DSCHRG MGMT >30: CPT | Performed by: PHYSICIAN ASSISTANT

## 2024-11-19 PROCEDURE — 2580000003 HC RX 258: Performed by: STUDENT IN AN ORGANIZED HEALTH CARE EDUCATION/TRAINING PROGRAM

## 2024-11-19 PROCEDURE — 97530 THERAPEUTIC ACTIVITIES: CPT

## 2024-11-19 RX ORDER — MUSCLE RUB CREAM 100; 150 MG/G; MG/G
1 CREAM TOPICAL 4 TIMES DAILY PRN
DISCHARGE
Start: 2024-11-19

## 2024-11-19 RX ADMIN — ACETAMINOPHEN 650 MG: 325 TABLET ORAL at 13:03

## 2024-11-19 RX ADMIN — PHENYTOIN SODIUM 100 MG: 100 CAPSULE, EXTENDED RELEASE ORAL at 09:40

## 2024-11-19 RX ADMIN — WATER 1000 MG: 1 INJECTION INTRAMUSCULAR; INTRAVENOUS; SUBCUTANEOUS at 11:14

## 2024-11-19 RX ADMIN — SODIUM CHLORIDE, PRESERVATIVE FREE 10 ML: 5 INJECTION INTRAVENOUS at 09:40

## 2024-11-19 ASSESSMENT — PAIN DESCRIPTION - ORIENTATION: ORIENTATION: RIGHT

## 2024-11-19 ASSESSMENT — PAIN SCALES - GENERAL
PAINLEVEL_OUTOF10: 5
PAINLEVEL_OUTOF10: 4

## 2024-11-19 ASSESSMENT — PAIN - FUNCTIONAL ASSESSMENT: PAIN_FUNCTIONAL_ASSESSMENT: PREVENTS OR INTERFERES SOME ACTIVE ACTIVITIES AND ADLS

## 2024-11-19 ASSESSMENT — PAIN DESCRIPTION - DESCRIPTORS: DESCRIPTORS: ACHING;DISCOMFORT

## 2024-11-19 ASSESSMENT — PAIN DESCRIPTION - LOCATION: LOCATION: HIP;KNEE

## 2024-11-19 NOTE — CARE COORDINATION
11/19/24, 1:22 PM EST    DISCHARGE PLANNING EVALUATION    Plan for discharge to [facility] confirmed with [facility] admissions, transport scheduled for 6:30 pm.  Message left for Jameson AYALA to update on plan for discharge today and transport time.      11/19/24, 1:23 PM EST    Patient goals/plan/ treatment preferences discussed by  and .  Patient goals/plan/ treatment preferences reviewed with patient/ family.  Patient/ family verbalize understanding of discharge plan and are in agreement with goal/plan/treatment preferences.  Understanding was demonstrated using the teach back method.  AVS provided by RN at time of discharge, which includes all necessary medical information pertaining to the patients current course of illness, treatment, post-discharge goals of care, and treatment preferences.     Services At/After Discharge: Skilled Nursing Facility (SNF) and In ambulance

## 2024-11-19 NOTE — PLAN OF CARE
Problem: Discharge Planning  Goal: Discharge to home or other facility with appropriate resources  11/14/2024 2218 by Christine Villalobos RN  Outcome: Progressing  11/14/2024 1116 by Pippa Gilbert LSW  Outcome: Progressing     Problem: Pain  Goal: Verbalizes/displays adequate comfort level or baseline comfort level  Outcome: Progressing     Problem: Safety - Adult  Goal: Free from fall injury  Outcome: Progressing     Problem: ABCDS Injury Assessment  Goal: Absence of physical injury  Outcome: Progressing     Problem: Risk for Elopement  Goal: Patient will not exit the unit/facility without proper excort  Outcome: Progressing     
  Problem: Discharge Planning  Goal: Discharge to home or other facility with appropriate resources  11/16/2024 0322 by Belle Lee, RN  Outcome: Progressing  Flowsheets (Taken 11/15/2024 2000)  Discharge to home or other facility with appropriate resources:   Identify barriers to discharge with patient and caregiver   Arrange for needed discharge resources and transportation as appropriate   Identify discharge learning needs (meds, wound care, etc)   Refer to discharge planning if patient needs post-hospital services based on physician order or complex needs related to functional status, cognitive ability or social support system  11/15/2024 1335 by Elana Mcguire RN  Outcome: Progressing  Flowsheets (Taken 11/15/2024 1335)  Discharge to home or other facility with appropriate resources:   Identify barriers to discharge with patient and caregiver   Arrange for needed discharge resources and transportation as appropriate   Identify discharge learning needs (meds, wound care, etc)   Refer to discharge planning if patient needs post-hospital services based on physician order or complex needs related to functional status, cognitive ability or social support system  Note: Patients discharge is not planned at this time.     Problem: Pain  Goal: Verbalizes/displays adequate comfort level or baseline comfort level  11/16/2024 0322 by Belle Lee, RN  Outcome: Progressing  Flowsheets (Taken 11/15/2024 1945)  Verbalizes/displays adequate comfort level or baseline comfort level:   Encourage patient to monitor pain and request assistance   Assess pain using appropriate pain scale   Implement non-pharmacological measures as appropriate and evaluate response   Administer analgesics based on type and severity of pain and evaluate response   Consider cultural and social influences on pain and pain management   Notify Licensed Independent Practitioner if interventions unsuccessful or patient reports new pain  11/15/2024 1330 
  Problem: Discharge Planning  Goal: Discharge to home or other facility with appropriate resources  11/17/2024 1036 by Behrns, Kailey, LPN  Outcome: Progressing  Flowsheets (Taken 11/16/2024 2007 by Belle Lee, RN)  Discharge to home or other facility with appropriate resources:   Identify barriers to discharge with patient and caregiver   Arrange for needed discharge resources and transportation as appropriate   Identify discharge learning needs (meds, wound care, etc)   Refer to discharge planning if patient needs post-hospital services based on physician order or complex needs related to functional status, cognitive ability or social support system     Problem: Pain  Goal: Verbalizes/displays adequate comfort level or baseline comfort level  11/17/2024 1036 by Behrns, Kailey, LPN  Outcome: Progressing  Flowsheets (Taken 11/16/2024 1957 by Belle Lee, RN)  Verbalizes/displays adequate comfort level or baseline comfort level:   Encourage patient to monitor pain and request assistance   Assess pain using appropriate pain scale   Administer analgesics based on type and severity of pain and evaluate response   Implement non-pharmacological measures as appropriate and evaluate response   Consider cultural and social influences on pain and pain management   Notify Licensed Independent Practitioner if interventions unsuccessful or patient reports new pain     Problem: Safety - Adult  Goal: Free from fall injury  11/17/2024 1036 by Behrns, Kailey, LPN  Outcome: Progressing  Flowsheets (Taken 11/16/2024 2007 by Belle Lee, RN)  Free From Fall Injury: Instruct family/caregiver on patient safety     Problem: ABCDS Injury Assessment  Goal: Absence of physical injury  11/17/2024 1036 by Behrns, Kailey, LPN  Outcome: Progressing  Flowsheets (Taken 11/15/2024 1335 by Elana Mcguire, RN)  Absence of Physical Injury: Implement safety measures based on patient assessment     Problem: Risk for Elopement  Goal: Patient 
  Problem: Discharge Planning  Goal: Discharge to home or other facility with appropriate resources  11/18/2024 1545 by Bridget Brown RN  Outcome: Progressing  Flowsheets (Taken 11/18/2024 1545)  Discharge to home or other facility with appropriate resources: Identify barriers to discharge with patient and caregiver     Problem: Pain  Goal: Verbalizes/displays adequate comfort level or baseline comfort level  11/18/2024 1545 by Bridegt Brown RN  Outcome: Progressing  Flowsheets (Taken 11/18/2024 1545)  Verbalizes/displays adequate comfort level or baseline comfort level: Encourage patient to monitor pain and request assistance     Problem: Safety - Adult  Goal: Free from fall injury  11/18/2024 1545 by Bridget Brown RN  Outcome: Progressing  Flowsheets (Taken 11/18/2024 1545)  Free From Fall Injury: Instruct family/caregiver on patient safety     Problem: ABCDS Injury Assessment  Goal: Absence of physical injury  11/18/2024 1545 by Bridget Brown RN  Outcome: Progressing  Flowsheets (Taken 11/18/2024 1545)  Absence of Physical Injury: Implement safety measures based on patient assessment     Problem: Risk for Elopement  Goal: Patient will not exit the unit/facility without proper excort  11/18/2024 1545 by Bridget Brown RN  Outcome: Progressing  Flowsheets (Taken 11/18/2024 1545)  Nursing Interventions for Elopement Risk: Assist with personal care needs such as toileting, eating, dressing, as needed to reduce the risk of wandering     Problem: Skin/Tissue Integrity  Goal: Absence of new skin breakdown  Description: 1.  Monitor for areas of redness and/or skin breakdown  2.  Assess vascular access sites hourly  3.  Every 4-6 hours minimum:  Change oxygen saturation probe site  4.  Every 4-6 hours:  If on nasal continuous positive airway pressure, respiratory therapy assess nares and determine need for appliance change or resting period.  11/18/2024 1545 by Bridget Brown RN  Outcome: Progressing  Note: No 
  Problem: Discharge Planning  Goal: Discharge to home or other facility with appropriate resources  Outcome: Adequate for Discharge     Problem: Pain  Goal: Verbalizes/displays adequate comfort level or baseline comfort level  Outcome: Adequate for Discharge     Problem: Safety - Adult  Goal: Free from fall injury  Outcome: Adequate for Discharge     Problem: ABCDS Injury Assessment  Goal: Absence of physical injury  Outcome: Adequate for Discharge     Problem: Risk for Elopement  Goal: Patient will not exit the unit/facility without proper excort  Outcome: Adequate for Discharge     Problem: Skin/Tissue Integrity  Goal: Absence of new skin breakdown  Description: 1.  Monitor for areas of redness and/or skin breakdown  2.  Assess vascular access sites hourly  3.  Every 4-6 hours minimum:  Change oxygen saturation probe site  4.  Every 4-6 hours:  If on nasal continuous positive airway pressure, respiratory therapy assess nares and determine need for appliance change or resting period.  Outcome: Adequate for Discharge     Problem: Neurosensory - Adult  Goal: Achieves stable or improved neurological status  Outcome: Adequate for Discharge     Problem: Skin/Tissue Integrity - Adult  Goal: Skin integrity remains intact  Outcome: Adequate for Discharge     Problem: Musculoskeletal - Adult  Goal: Return mobility to safest level of function  Outcome: Adequate for Discharge     Problem: Genitourinary - Adult  Goal: Urinary catheter remains patent  Outcome: Adequate for Discharge     Problem: Metabolic/Fluid and Electrolytes - Adult  Goal: Electrolytes maintained within normal limits  Outcome: Adequate for Discharge     
  Problem: Discharge Planning  Goal: Discharge to home or other facility with appropriate resources  Outcome: Progressing  Flowsheets (Taken 11/16/2024 2007)  Discharge to home or other facility with appropriate resources:   Identify barriers to discharge with patient and caregiver   Arrange for needed discharge resources and transportation as appropriate   Identify discharge learning needs (meds, wound care, etc)   Refer to discharge planning if patient needs post-hospital services based on physician order or complex needs related to functional status, cognitive ability or social support system     Problem: Pain  Goal: Verbalizes/displays adequate comfort level or baseline comfort level  Outcome: Progressing  Flowsheets (Taken 11/16/2024 1957)  Verbalizes/displays adequate comfort level or baseline comfort level:   Encourage patient to monitor pain and request assistance   Assess pain using appropriate pain scale   Administer analgesics based on type and severity of pain and evaluate response   Implement non-pharmacological measures as appropriate and evaluate response   Consider cultural and social influences on pain and pain management   Notify Licensed Independent Practitioner if interventions unsuccessful or patient reports new pain     Problem: Safety - Adult  Goal: Free from fall injury  Outcome: Progressing  Flowsheets (Taken 11/16/2024 2007)  Free From Fall Injury: Instruct family/caregiver on patient safety     Problem: ABCDS Injury Assessment  Goal: Absence of physical injury  Outcome: Progressing  Flowsheets (Taken 11/15/2024 1335 by Elana Mcguire RN)  Absence of Physical Injury: Implement safety measures based on patient assessment     Problem: Risk for Elopement  Goal: Patient will not exit the unit/facility without proper excort  Outcome: Progressing  Flowsheets (Taken 11/16/2024 2007)  Nursing Interventions for Elopement Risk:   Assist with personal care needs such as toileting, eating, 
  Problem: Discharge Planning  Goal: Discharge to home or other facility with appropriate resources  Outcome: Progressing  Flowsheets (Taken 11/17/2024 2025)  Discharge to home or other facility with appropriate resources:   Identify barriers to discharge with patient and caregiver   Arrange for needed discharge resources and transportation as appropriate   Identify discharge learning needs (meds, wound care, etc)   Refer to discharge planning if patient needs post-hospital services based on physician order or complex needs related to functional status, cognitive ability or social support system     Problem: Pain  Goal: Verbalizes/displays adequate comfort level or baseline comfort level  Outcome: Progressing  Flowsheets (Taken 11/17/2024 2025)  Verbalizes/displays adequate comfort level or baseline comfort level:   Encourage patient to monitor pain and request assistance   Assess pain using appropriate pain scale   Administer analgesics based on type and severity of pain and evaluate response   Implement non-pharmacological measures as appropriate and evaluate response   Consider cultural and social influences on pain and pain management   Notify Licensed Independent Practitioner if interventions unsuccessful or patient reports new pain     Problem: Safety - Adult  Goal: Free from fall injury  Outcome: Progressing  Flowsheets (Taken 11/16/2024 2007)  Free From Fall Injury: Instruct family/caregiver on patient safety     Problem: ABCDS Injury Assessment  Goal: Absence of physical injury  Outcome: Progressing  Flowsheets (Taken 11/15/2024 1335 by Elana Mcguire RN)  Absence of Physical Injury: Implement safety measures based on patient assessment     Problem: Risk for Elopement  Goal: Patient will not exit the unit/facility without proper excort  Outcome: Progressing  Flowsheets (Taken 11/17/2024 2025)  Nursing Interventions for Elopement Risk:   Assist with personal care needs such as toileting, eating, 
  Problem: Safety - Adult  Goal: Free from fall injury  11/18/2024 2252 by Arnav Atwood RN  Outcome: Progressing  Flowsheets (Taken 11/18/2024 1550 by Bridget Brown RN)  Free From Fall Injury: Instruct family/caregiver on patient safety  11/18/2024 1547 by Bridget Brown RN  Outcome: Progressing  Flowsheets (Taken 11/18/2024 1547)  Free From Fall Injury: Instruct family/caregiver on patient safety  11/18/2024 1545 by Bridget Brown RN  Outcome: Progressing  Flowsheets (Taken 11/18/2024 1545)  Free From Fall Injury: Instruct family/caregiver on patient safety  11/18/2024 1545 by Bridget Brown RN  Outcome: Progressing  Flowsheets (Taken 11/18/2024 1545)  Free From Fall Injury: Instruct family/caregiver on patient safety     Problem: Metabolic/Fluid and Electrolytes - Adult  Goal: Electrolytes maintained within normal limits  11/18/2024 2252 by Arnav Atwood RN  Outcome: Progressing  11/18/2024 1547 by Bridget Brown RN  Outcome: Progressing  Flowsheets (Taken 11/18/2024 1547)  Electrolytes maintained within normal limits: Monitor labs and assess patient for signs and symptoms of electrolyte imbalances  11/18/2024 1545 by Bridget Brown RN  Outcome: Progressing  11/18/2024 1545 by Bridget Brown RN  Outcome: Progressing  Flowsheets (Taken 11/18/2024 1545)  Electrolytes maintained within normal limits: Monitor labs and assess patient for signs and symptoms of electrolyte imbalances     Problem: Discharge Planning  Goal: Discharge to home or other facility with appropriate resources  11/18/2024 2252 by Arnav Atwood RN  Outcome: Progressing  11/18/2024 1547 by Bridget Brown RN  Outcome: Progressing  Flowsheets (Taken 11/18/2024 1547)  Discharge to home or other facility with appropriate resources: Identify barriers to discharge with patient and caregiver  11/18/2024 1545 by Bridget Brown RN  Outcome: Progressing  Flowsheets (Taken 11/18/2024 1545)  Discharge to home or other facility with appropriate 
Patient admitted early this morning for hip and pelvic fracture.      Patient was seen at bedside.  Patient is laying in bed, restless, insistent on trying to get up to stand.  Reviewed with her that physical therapy and nursing have tried to get her up to bedside several times today and she has not been able to tolerate due to pain.  Caregiver is at bedside and states that this is her regular defensive behavior.     consulted and working on placement.  Patient does have a POA who is out of state   Orthopedic team evaluated today recommending closed treatment fracture-no surgical plans at this time.    Will continue to monitor H&H due to pelvic hematoma.    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  Currently on no anticoagulant due to hematoma.  Will need to monitor closely.    Patient is hemodynamically stable.  Hospitalist group will continue to follow  
Plans home with HH or snf  
will not exit the unit/facility without proper excort  Outcome: Progressing  Flowsheets (Taken 11/15/2024 6051)  Nursing Interventions for Elopement Risk:   Assist with personal care needs such as toileting, eating, dressing, as needed to reduce the risk of wandering   Collaborate with family members/caregivers to mitigate the elopement risk     Problem: Skin/Tissue Integrity  Goal: Absence of new skin breakdown  Description: 1.  Monitor for areas of redness and/or skin breakdown  2.  Assess vascular access sites hourly  3.  Every 4-6 hours minimum:  Change oxygen saturation probe site  4.  Every 4-6 hours:  If on nasal continuous positive airway pressure, respiratory therapy assess nares and determine need for appliance change or resting period.  Outcome: Progressing  Note: No new skin breakdown noted at this time. Wound and ostomy seen for current opened area on coccyx that patient came with

## 2024-11-19 NOTE — DISCHARGE SUMMARY
Hospital Medicine Discharge Summary      Patient Identification:   Estelita Sosa   : 10/4/1934  MRN: [id number]   Account: [id number]      Patient's PCP: Darrick Acharya MD    Admit Date: 2024     Discharge Date:   2024    Admitting Physician: Giovany Shaw MD     Discharging Physician Assistant: Vibha Joseph PA-C     Discharge Diagnoses with Assessment/Plan:    Left inferior pubic ramus and acetabular fractures secondary to fall:  Noted on CT pelvis in ED.   Orthopedic surgery consulted.  Recommendations for close management of fracture.  Follow-up in 2 weeks outpatient  Discharge to SNF.  Previously from home.  POA in agreement    UTI:  Urine culture with E. coli.  Completed course of Rocephin    Urinary retention  Mosley placed.  Failed void trial on day of discharge.  Mosley replaced and referral to urology ordered    Pelvic sidewall hematoma:   CT showing left-sided pelvic sidewall hematoma with trace extraperitoneal hemorrhage  Hemoglobin has remained stable    Thrombocytopenia- chronic and stable.    CODE STATUS: Patient reports that she does not want CPR, intubation, or any resuscitative measures initiated if she were to have a cardiac arrest or arrest. Limited CODE STATUS currently.    Chronic Conditions (reviewed and stable unless otherwise stated)  History of seizures: Continue Dilantin. Patient reports he has not had a seizure for many years. Reports compliance with Dilantin at home    The patient was seen and examined on day of discharge and this discharge summary is in conjunction with any daily progress note from day of discharge.    Hospital Course:   Estelita Sosa is a 89 y.o. female admitted to [facility] on 2024 after sustaining a fall in her kitchen.  Patient reports she lost her balance causing her to slip and fall to the floor.  Patient lives at home but does have caregivers that take care of her.  She states at the time of the fall she

## 2024-11-21 NOTE — PROGRESS NOTES
Hospitalist Progress Note      Patient:  Estelita Sosa    Unit/Bed:7K-18/018-A  YOB: 1934  MRN: [id number]   Acct: [id number]   PCP: Darrick Acharya MD  Date of Admission: 11/13/2024    ASSESSMENT AND PLAN    Active Problems  Left inferior pubic ramus and acetabular fractures: Noted on CT pelvis in ED. Orthopedic surgery was consulted in ED. Plan for closed treatment of fractures. PT/OT following. Social work following, potential SNF on discharge. Patient is more alert today, appears to be at baseline. Need to discuss with POA about disposition plans.  UTI: Patient on admission has symptoms of dysuria. UA showed many bacteria, negative nitrate and leukocyte esterase. Urine culture shows gram-negative bacilli. Will continue Rocephin at this time.  Pelvic sidewall hematoma: CT showing left-sided pelvic sidewall hematoma with trace extraperitoneal hemorrhage. Will monitor hemoglobin every 12 hours. Most recent hemoglobin stable at 11. No signs of active bleeding. SCDs for DVT prophylaxis.  Thrombocytopenia: Initial platelet level was 120. Has history of this.  Platelet level today was 79. Will continue to monitor with daily CBC.  Elevated troponin: Initial troponin 17, repeat 14. EKG showing sinus rhythm with PVCs, no acute ischemic changes. Patient denies any chest pain or any other systems. No history of CAD. Will continue to monitor on telemetry. Likely secondary to demand.  CODE STATUS: Patient reports that she does not want CPR, intubation, or any resuscitative measures initiated if she were to have a cardiac arrest or arrest. Limited CODE STATUS currently.  Chronic Conditions (reviewed and stable unless otherwise stated)  History of seizures: Continue Dilantin. Patient reports he has not had a seizure for many years. Reports compliance with Dilantin at home      LDA: []CVC / []PICC / []Midline / []Mosley / []Drains / 
                                                                       Hospitalist Progress Note      Patient:  Estelita Sosa    Unit/Bed:7K-18/018-A  YOB: 1934  MRN: [id number]   Acct: [id number]   PCP: Darrick Acharya MD  Date of Admission: 11/13/2024    ASSESSMENT AND PLAN    Active Problems  Left inferior pubic ramus and acetabular fractures: Noted on CT pelvis in ED. Orthopedic surgery was consulted in ED. Plan for closed treatment of fractures. PT/OT following. Social work following, potential SNF on discharge. Patient is more alert today, appears to be at baseline. Need to discuss with POA about disposition plans.  UTI: Patient on admission has symptoms of dysuria. UA showed many bacteria, negative nitrate and leukocyte esterase. Urine culture shows gram-negative bacilli. Will continue Rocephin at this time.  Pelvic sidewall hematoma: CT showing left-sided pelvic sidewall hematoma with trace extraperitoneal hemorrhage. Will monitor hemoglobin every 12 hours. Most recent hemoglobin stable.  No signs of active bleeding. SCDs for DVT prophylaxis.  Thrombocytopenia- chronic and stable. Will continue to monitor with daily CBC.  Elevated troponin: Initial troponin 17, repeat 14. EKG showing sinus rhythm with PVCs, no acute ischemic changes. Patient denies any chest pain or any other systems. No history of CAD. Will continue to monitor on telemetry. Likely secondary to demand.  CODE STATUS: Patient reports that she does not want CPR, intubation, or any resuscitative measures initiated if she were to have a cardiac arrest or arrest. Limited CODE STATUS currently.  Chronic Conditions (reviewed and stable unless otherwise stated)  History of seizures: Continue Dilantin. Patient reports he has not had a seizure for many years. Reports compliance with Dilantin at home      LDA: []CVC / []PICC / []Midline / []Mosley / []Drains / []Mediport / [x]None  Antibiotics: Rocephin  Steroids: None  Labs (still 
                                                                       Hospitalist Progress Note      Patient:  Estelita Sosa    Unit/Bed:7K-18/018-A  YOB: 1934  MRN: [id number]   Acct: [id number]   PCP: Darrick Acharya MD  Date of Admission: 11/13/2024    ASSESSMENT AND PLAN    Active Problems  Left inferior pubic ramus and acetabular fractures: Noted on CT pelvis in ED. Orthopedic surgery was consulted in ED. Plan for closed treatment of fractures. PT/OT following. Social work following, potential SNF on discharge. Patient is more alert today, appears to be at baseline. Need to discuss with POA about disposition plans.  UTI: Patient on admission has symptoms of dysuria. UA showed many bacteria, negative nitrate and leukocyte esterase. Urine culture shows E. coli. Will continue Rocephin at this time (end date 11/19/2024).  Pelvic sidewall hematoma: CT showing left-sided pelvic sidewall hematoma with trace extraperitoneal hemorrhage. Will monitor hemoglobin every 12 hours. Most recent hemoglobin stable at 10.4. No signs of active bleeding. SCDs for DVT prophylaxis.  Thrombocytopenia: Initial platelet level was 120. Has history of this.  Platelet level today was 89. Will continue to monitor with daily CBC.  Elevated troponin: Initial troponin 17, repeat 14. EKG showing sinus rhythm with PVCs, no acute ischemic changes. Patient denies any chest pain or any other systems. No history of CAD. Will continue to monitor on telemetry. Likely secondary to demand.  CODE STATUS: Patient reports that she does not want CPR, intubation, or any resuscitative measures initiated if she were to have a cardiac arrest or arrest. Limited CODE STATUS currently.  Chronic Conditions (reviewed and stable unless otherwise stated)  History of seizures: Continue Dilantin. Patient reports he has not had a seizure for many years. Reports compliance with Dilantin at home      LDA: []CVC / []PICC / []Midline / []Mosley / 
                                                                       Hospitalist Progress Note      Patient:  Estelita Sosa    Unit/Bed:7K-18/018-A  YOB: 1934  MRN: [id number]   Acct: [id number]   PCP: Darrick Acharya MD  Date of Admission: 11/13/2024    ASSESSMENT AND PLAN    Active Problems  Left inferior pubic ramus and acetabular fractures: Noted on CT pelvis in ED.  Orthopedic surgery was consulted in ED.  Plan for closed treatment of fractures.  PT/OT following.  Social work following, potential SNF on discharge. Patient is confused, appears to be at baseline.  Need to discuss with POA about disposition plans.  Pelvic sidewall hematoma: CT showing left-sided pelvic sidewall hematoma with trace extraperitoneal hemorrhage. Will monitor hemoglobin every 12 hours. Most recent hemoglobin stable at 11.4.  No signs of active bleeding.  SCDs for DVT prophylaxis.  Thrombocytopenia: Initial platelet level was 120.  Has history of this.  Platelet level today was 76.  Will continue to monitor with daily CBC.  Elevated troponin: Initial troponin 17, repeat 14. EKG showing sinus rhythm with PVCs, no acute ischemic changes. Patient denies any chest pain or any other systems. No history of CAD. Will continue to monitor on telemetry. Likely secondary to demand.  CODE STATUS: Patient reports that she does not want CPR, intubation, or any resuscitative measures initiated if she were to have a cardiac arrest or arrest. Limited CODE STATUS currently.  Chronic Conditions (reviewed and stable unless otherwise stated)  History of seizures: Continue Dilantin. Patient reports he has not had a seizure for many years. Reports compliance with Dilantin at home      LDA: []CVC / []PICC / []Midline / []Mosley / []Drains / []Mediport / [x]None  Antibiotics: None  Steroids: None  Labs (still needed?): [x]Yes / []No  IVF (still needed?): []Yes / [x]No    Level of care: []Step Down / [x]Med-Surg  Bed Status: [x]Inpatient / 
  Physician Progress Note      PATIENT:               JUS CAMPUZANO  [facility] #:                  [id number]  :                       10/4/1934  ADMIT DATE:       2024 8:09 PM  DISCH DATE:        2024 7:30 AM  RESPONDING  PROVIDER #:        Stacy Marie PA-C          QUERY TEXT:    Pt admitted with Left inferior pubic ramus and acetabular fractures. Pt noted   to have Osteopenia in Hip Xray 2024.  If possible, please document in   progress notes and discharge summary if you are evaluating and/or treating any   of the following:    The medical record reflects the following:  Risk Factors: Age 89 yr female, Osteoporosis, Fall, Osteopenia, Post menopause    Clinical Indicators: H&P 2024- Left inferior pubic ramus and acetabular   fractures: Noted on CT pelvis in ER.  Orthopedic surgery consulted from ER,   will see patient in the morning.  Denies any pain currently.  Plan for PT/OT   evaluation after orthopedic evaluation    Progress Notes 2024-Left inferior pubic ramus and acetabular fractures:   Noted on CT pelvis in ED. Orthopedic surgery was consulted in ED. Plan for   closed treatment of fractures. PT/OT following. Social work following,   potential SNF on discharge    Hip Xray 2024-Diffuse osteopenia.    Treatment:  Orthopedic consult, CT pelvis, Cholecalciferol      Thank you  [facility]  Options provided:  -- Pathological Left inferior pubic ramus and acetabular fracture due to   osteopenia following fall which would not usually break a normal, healthy bone  -- Osteoporotic Left inferior pubic ramus and acetabular fracture following   fall which would not usually break a normal, healthy bone  -- Traumatic Left inferior pubic ramus and acetabular fracture  -- Other - I will add my own diagnosis  -- Disagree - Not applicable / Not valid  -- Disagree - Clinically unable to determine / Unknown  -- Refer to Clinical Documentation Reviewer    PROVIDER RESPONSE 
 [facility]  INPATIENT PHYSICAL THERAPY  DAILY NOTE  [facility] ORTHOPEDICS 7K - 7K-18/018-A      Discharge Recommendations: Subacte/Skilled Nursing Facility  Equipment Recommendations:    monitor for needs             Time In: 1100  Time Out: 1133  Timed Code Treatment Minutes: 33 Minutes  Minutes: 33          Date: 11/15/2024  Patient Name: Estelita Sosa,  Gender:  female        MRN: [id number]  : 10/4/1934  (89 y.o.)     Referring Practitioner: Silke Huerta APRN - CNP  Diagnosis: Closed fracture of inferior pubic ramus, left, initial encounter (HCC)  Additional Pertinent Hx: Per HPI, \"89 y.o. female who presents with L hip pain after an unwitnessed fall in her home. She was ambulating in the kitchen without a walker, mechanical, did not hit head, no loc, inability to get off the floor prompting EMS call and ED visit. Imaging in the ED revealed a L pubic rami and acetabulum fracture for which orthopaedics was consulted. She denies any pain at this time, ambulating does cause some discomfort to the left groin, otherwise denies any pain with palpation Rom, no paresthesias or weakness. No other msk complaints. Baseline walker dependent.      Patient is not a very reliable historian at this time, unsure if medication induced, she is oriented to self, able to relay events of fall, unclear on year and events of admission. She is rather sporadic with conversation and is having word findings difficulties. States she lives alone with a family friend who checks on her routinely, did not allude to daily care givers as noted in other notes of provider.\"     Prior Level of Function:  Lives With: Alone  Type of Home: Condo  Home Layout: One level  Home Access: Level entry, Elevator  Home Equipment: Walker - 4-Wheeled        Ambulation Assistance: Independent  Transfer Assistance: Independent  Active : No  Additional Comments: Pt has friend who assists on weekends, Pt has 3 other caretakers who come in 
[facility]  [facility] ORTHOPEDICS 7K  Occupational Therapy  Daily Note    Discharge Recommendations: Subacute/skilled nursing facility and Not safe to return home at this time  Equipment Recommendations: No defer to next ;evel of care      Time In: 1404  Time Out: 1434  Timed Code Treatment Minutes: 30 Minutes  Minutes: 30          Date: 2024  Patient Name: Estelita Sosa,   Gender: female      Room: [facility]18/018-A  MRN: [id number]  : 10/4/1934  (89 y.o.)  Referring Practitioner: Silke Huerta APRN - CNP  Diagnosis: Closed fracture of inferior pubic ramus, left, initial encounter ([facility])  Additional Pertinent Hx: 89 y.o. female who presents with L hip pain after an unwitnessed fall in her home. She was ambulating in the kitchen without a walker, mechanical, did not hit head, no loc, inability to get off the floor prompting EMS call and ED visit. Imaging in the ED revealed a L pubic rami and acetabulum fracture for which orthopaedics was consulted. She denies any pain at this time, ambulating does cause some discomfort to the left groin, otherwise denies any pain with palpation Rom, no paresthesias or weakness. Ortho recommending closed treatment L pubic rami fracture, L acetabulum fracture and WBAT with walker.    Restrictions/Precautions:  Restrictions/Precautions: General Precautions, Weight Bearing, Fall Risk  Left Lower Extremity Weight Bearing: Weight Bearing As Tolerated     Social/Functional History:  Lives With: Alone  Type of Home: Condo  Home Layout: One level  Home Access: Level entry, Elevator  Home Equipment: Walker - 4-Wheeled           Ambulation Assistance: Independent  Transfer Assistance: Independent    Active : No  Patient's  Info: friend     Additional Comments: Pt has friend who assists on weekends, Pt has 3 other caretakers who come in daily to assist as needed. No 24 hour supervision available.    SUBJECTIVE: Pt pleasant and cooperative; consent to O.T. Pt highly 
After receiving oxycodone patient's behaviors have become more aggitated. Patient yelling out for \"Ray\" repeatedly and is harder to redirect and reorient. Patient becomes angry with nurse when she is redirected and is mad she cannot stand, despite being told she has a fractured hip and pelvis. Patient kicking her leg out of bed and reaching onto bed rails in attempts to get out of bed.     Telesitter ordered.     Attending, Vibha, notified of patient changes. One time dose of 1mg IV Ativan ordered. Pain medication switched to morphine.   
[facility]  [facility] ORTHOPEDICS 7K  Occupational Therapy  Daily Note    Discharge Recommendations: Skilled Nursing Facility  Equipment Recommendations: No defer to next ;evel of care      Time In: 1430  Time Out: 1509  Timed Code Treatment Minutes: 39 Minutes  Minutes: 39          Date: 2024  Patient Name: Estelita Sosa,   Gender: female      Room: [address] [place] [address]  MRN: [id number]  : 10/4/1934  (89 y.o.)  Referring Practitioner: Silke Huerta APRN - CNP  Diagnosis: Closed fracture of inferior pubic ramus, left, initial encounter (HCC)  Additional Pertinent Hx: 89 y.o. female who presents with L hip pain after an unwitnessed fall in her home. She was ambulating in the kitchen without a walker, mechanical, did not hit head, no loc, inability to get off the floor prompting EMS call and ED visit. Imaging in the ED revealed a L pubic rami and acetabulum fracture for which orthopaedics was consulted. She denies any pain at this time, ambulating does cause some discomfort to the left groin, otherwise denies any pain with palpation Rom, no paresthesias or weakness. Ortho recommending closed treatment L pubic rami fracture, L acetabulum fracture and WBAT with walker.    Restrictions/Precautions:  Restrictions/Precautions: General Precautions, Weight Bearing, Fall Risk  Left Lower Extremity Weight Bearing: Weight Bearing As Tolerated     Social/Functional History:  Lives With: Alone  Type of Home: [facility]  Home Layout: One level  Home Access: Level entry, Elevator  Home Equipment: Walker - 4-Wheeled           Ambulation Assistance: Independent  Transfer Assistance: Independent    Active : No  Patient's  Info: friend     Additional Comments: Pt has friend who assists on weekends, Pt has 3 other caretakers who come in daily to assist as needed. No 24 hour supervision available.    SUBJECTIVE: Patient laying in bed upon arrival; requesting OOB activity.  Patient orient to self, orientation required 
[facility]  [facility] ORTHOPEDICS 7K  Occupational Therapy  Daily Note    Discharge Recommendations: Subacute/skilled nursing facility  Equipment Recommendations: No defer to next ;evel of care       Time In: 905  Time Out: 932  Timed Code Treatment Minutes: 27 Minutes  Minutes: 27          Date: 2024  Patient Name: Estelita Sosa,   Gender: female      Room: [address] [place] [address]  MRN: [id number]  : 10/4/1934  (89 y.o.)  Referring Practitioner: Silke Huerta APRN - CNP  Diagnosis: Closed fracture of inferior pubic ramus, left, initial encounter (HCC)  Additional Pertinent Hx: 89 y.o. female who presents with L hip pain after an unwitnessed fall in her home. She was ambulating in the kitchen without a walker, mechanical, did not hit head, no loc, inability to get off the floor prompting EMS call and ED visit. Imaging in the ED revealed a L pubic rami and acetabulum fracture for which orthopaedics was consulted. She denies any pain at this time, ambulating does cause some discomfort to the left groin, otherwise denies any pain with palpation Rom, no paresthesias or weakness. Ortho recommending closed treatment L pubic rami fracture, L acetabulum fracture and WBAT with walker.    Restrictions/Precautions:  Restrictions/Precautions: General Precautions, Weight Bearing, Fall Risk  Left Lower Extremity Weight Bearing: Weight Bearing As Tolerated      Social/Functional History:  Lives With: Alone  Type of Home: Condo  Home Layout: One level  Home Access: Level entry, Elevator  Home Equipment: Walker - 4-Wheeled           Ambulation Assistance: Independent  Transfer Assistance: Independent    Active : No  Patient's  Info: friend     Additional Comments: Pt has friend who assists on weekends, Pt has 3 other caretakers who come in daily to assist as needed. No 24 hour supervision available.    SUBJECTIVE: Patient laying in bed upon arrival; agreeable to therapy with encouragement.  Patient expresses 
[facility]  OCCUPATIONAL THERAPY MISSED TREATMENT NOTE  [facility] 7K  7K-18/018-A      Date: 11/15/2024  Patient Name: Estelita Sosa        CSN: [id number]   : 10/4/1934  (89 y.o.)  Gender: female   Referring Practitioner: Silke Huerta APRN - CNP  Diagnosis: pelvic hematoma in female         REASON FOR MISSED TREATMENT: OT treatment attempted Pt. Reports Pt. Resting and not appropriate currently was previously given medication for pain and anxiety.  Will re-attempt later this date as time allows.        
Patient received last rites/anointing by a . If you are in need of  support, please call [phone]. If you are in need of a  after 6:30pm, please call the house supervisor for the on-call .      Chiquita Cox  Spiritual Health Coordinator  [phone]   
Report called to [facility]. All questions answered. All patient's belonging packed for transport.   
SBIRT screening completed per trauma protocol. SBIRT Findings are Negative.  Patient denies alcohol and/or drug use. Also denies depressive symptoms.    Brief Intervention and Referral to Treatment Summary N/A    Patient was provided PHQ-9, AUDIT-C and DAST Screening:      PHQ-9 Score:  Negative  AUDIT-C Score:  Negative  DAST Score:   Negative    Patient’s substance use is considered-Negative    Low Risk/Healthy  Moderate Risk  Harmful  Dependent    Patient’s depression is considered:-Negative    Minimal  Mild   Moderate  Moderately Severe  Severe    Brief Education Was Provided N/A    Patient was receptive  Patient was not receptive      Brief Intervention Is Provided (Only for AUDIT-C or DAST) N/A    Patient reports readiness to decrease and/or stop use and a plan was discussed   Patient denies readiness to decrease and/or stop use and a plan was not discussed    Injured Trauma Survivor Screening  1.  When you were injured or right afterward   Did you think you were going to die? RESPONSES; YES+1/NO: NO  Do you think this was done to you intentionally? NO    Since your injury  Have you felt more restless, tense or jumpy than usual? RESPONSES; YES+1/NO: NO  Have you found yourself unable to stop worrying? RESPONSES; YES+1/NO: NO  Do you find yourself thinking that the world is unsafe and that people are not to be trusted? RESPONSES; YES+1/NO: NO    TOTAL SCORE from ITSS Questions 1 and 2:   0  NOTE: A score of greater than or equal to 2 is considered positive for PTSD risk and is to receive a community resource packet to link with appropriate providers.    Recommendations/Referrals for Brief and/or Specialized Treatment Provided to Patient  N/A      
Spiritual Health History and Assessment/Progress Note  [facility]    (P) Initial Encounter, Loneliness/Social Isolation,  ,  ,      Name: Estelita Sosa MRN: [id number]    Age: 89 y.o.     Sex: female   Language: English   Hinduism: Religion   Closed fracture of inferior pubic ramus, left, initial encounter ([facility])     Date: 11/14/2024            Total Time Calculated: (P) 25 min              Spiritual Assessment began in [facility] ORTHOPEDICS 7K        Referral/Consult From: (P) Rounding   Encounter Overview/Reason: (P) Initial Encounter, Loneliness/Social Isolation  Service Provided For: (P) Patient, Friend    Bonita, Belief, Meaning:   Patient identifies as spiritual and is connected with a bonita tradition or spiritual practice  Family/Friends Other: Did not assess during visit      Importance and Influence:  Patient has spiritual/personal beliefs that influence decisions regarding their health  Family/Friends Other: Did not assess during visit    Community:  Patient is connected with a spiritual community and feels well-supported. Support system includes: Bonita Community and Friends  Family/Friends Other: Did not assess during visit    Assessment and Plan of Care:     Patient, Estelita, is dealing with short term memory loss and appeared agitated as she struggled to remember her recent fractures. Patient appeared less agitated as she shared about her childhood, marriage, and spiritual practices as a practicing Religion.    Patient Interventions include: Facilitated expression of thoughts and feelings, Engaged in theological reflection, and Provided sacramental/Methodist ritual  Family/Friends Interventions include: Provided sacramental/Methodist ritual    Patient Plan of Care: Spiritual Care available upon further referral  Family/Friends Plan of Care: Spiritual Care available upon further referral    Electronically signed by Chaplain Jessy on 11/14/2024 at 2:10 PM    
While patient was getting up from the bathroom she became very anxious and was trying to get out of the denny steady. Nurse Gloria, EDWARD or myself were unable to calm her down. Patients RLE had a previous bruise which opened and now has a 2 cm long skin tear. Steri strips and bordered foam dressing was applied.  
Climbing Raw Score : 6  AM-PAC Inpatient without Stair Climbing T-Scale Score : 26.48     Modified Madisyn Scale:  Not Applicable    ASSESSMENT:  Assessment: Patient progressing toward established goals.  Activity Tolerance:  Patient tolerance of  treatment:Good.  Plan: Current Treatment Recommendations: Strengthening, Balance training, Gait training, Functional mobility training, Transfer training, Patient/Caregiver education & training, Safety education & training, Home exercise program, Therapeutic activities, Endurance training  General Plan:  (3-5x O)    Education:  Learners: Patient  Patient Education: Transfers    Goals:  Patient Goals : go home  Short Term Goals  Time Frame for Short Term Goals: at discharge  Short Term Goal 1: Pt to be SBA for supine <> sit to get in/out of bed  Short Term Goal 2: Pt to be Min A x 1 to complete sit <> stand to a walker or GLORY Stedy for pregait activity  Short Term Goal 3: Ambulation to be assessed as able  Long Term Goals  Time Frame for Long Term Goals : not set due to short ELOS    Following session, patient left in safe position with all fall risk precautions in place.       
Not Applicable    ASSESSMENT:  Activity Tolerance:  Patient tolerance of treatment:Fair. Poor.    Treatment Initiated: Treatment and education initiated within context of evaluation.  Evaluation time included review of current medical information, gathering information related to past medical, social and functional history, completion of standardized testing, formal and informal observation of tasks, assessment of data and development of plan of care and goals.  Treatment time included skilled education and facilitation of tasks to increase safety and independence with functional mobility for improved independence and quality of life.    Assessment:  Body Structures, Functions, Activity Limitations Requiring Skilled Therapeutic Intervention: Decreased functional mobility , Decreased strength, Decreased endurance, Decreased balance  Assessment: Pt is 89 yo female that is deconditioned and participated well. Pt was generally Modified Ind for mobility in PLOF and is at Moderate A x 1-2 today. Pt is not safe for return to home at this time and would benefit from continued skilled PT to address strengthening, balance, bed mobility, endurance building, and functional mobility training.    Therapy Prognosis: Fair, Good    Requires PT Follow-Up: Yes    Patient Education:      .    Patient Education  Education Given To: Patient  Education Provided: Role of Therapy, Plan of Care, Home Exercise Program, Precautions, Transfer Training  Education Method: Demonstration, Verbal  Barriers to Learning: Cognition  Education Outcome: Verbalized understanding, Demonstrated understanding, Continued education needed       Plan:  Current Treatment Recommendations: Strengthening, Balance training, Gait training, Functional mobility training, Transfer training, Patient/Caregiver education & training, Safety education & training, Home exercise program, Therapeutic activities, Endurance training  General Plan:  (3-5x O)    Goals:  Patient 
Given To: Patient;Caregiver  Education Provided: Role of Therapy;Plan of Care;Transfer Training;Mobility Training  Education Method: Demonstration  Barriers to Learning: Cognition (no 24 hour supervision available)  Education Outcome: Verbalized understanding;Demonstrated understanding    Plan:  Times Per Week: 5-6x  Times Per Day: Once a day  Current Treatment Recommendations: Strengthening, Balance training, Functional mobility training, Endurance training, Neuromuscular re-education, Safety education & training, Self-Care / ADL, Equipment evaluation, education, & procurement, Cognitive/Perceptual training.  See long-term goal time frame for expected duration of plan of care.  If no long-term goals established, a short length of stay is anticipated.    Goals:  Patient goals : To return home  Short Term Goals  Time Frame for Short Term Goals: until discharge  Short Term Goal 1: Pt will progress with sit to stand transfers at [facility]ny [facility] or walker as appropriate.  Short Term Goal 2: Pt will complete UB/LB dressing/bathing with moderate A to improve indep with ADL routines.  Short Term Goal 3: Pt will progress with static standing as appropriate to improve balance and activity tolerance required fro toileting routines.  Short Term Goal 4: Pt will perform toileting routine with moderate A and minimal cues for safety awareness to improve indep with ADL routines.    AM-PAC Inpatient Daily Activity Raw Score: 10  AM-PAC Inpatient ADL T-Scale Score : 27.31    Following session, patient left in safe position with all fall risk precautions in place.
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
## (undated) DEVICE — Z DISCONTINUED USE 2717541 SUTURE STRATAFIX SZ 3-0 L30CM NONABSORBABLE UD L26MM FS 3/8

## (undated) DEVICE — 3M™ WARMING BLANKET, UPPER BODY, 10 PER CASE, 42268: Brand: BAIR HUGGER™

## (undated) DEVICE — SYSTEM SKIN CLSR 22CM DERMBND PRINEO

## (undated) DEVICE — GOWN,SIRUS,NON REINFRCD,LARGE,SET IN SL: Brand: MEDLINE

## (undated) DEVICE — SUTURE STRATAFIX SPRL SZ 2-0 L9IN ABSRB VLT MH L36MM 1/2 SXPD2B408

## (undated) DEVICE — GLOVE ORANGE PI 7   MSG9070

## (undated) DEVICE — REFLECTION FLEXIBLE DRILL 35MM: Brand: REFLECTION

## (undated) DEVICE — SOLUTION IV 1000ML 0.9% SOD CHL PH 5 INJ USP VIAFLX PLAS

## (undated) DEVICE — SUREFIT, DUAL DISPERSIVE ELECTRODE, CONTACT QUALITY MONITOR: Brand: SUREFIT

## (undated) DEVICE — SUTURE VCRL SZ 1 L36IN ABSRB UD CTX L48MM 1/2 CIR J977H

## (undated) DEVICE — ULTRACLEAN ACCESSORY ELECTRODE 6" (15.24 CM) COATED BLADE: Brand: ULTRACLEAN

## (undated) DEVICE — GLOVE SURG SZ 65 THK91MIL LTX FREE SYN POLYISOPRENE

## (undated) DEVICE — COVER ARMBRD W13XL28.5IN IMPERV BLU FOR OP RM

## (undated) DEVICE — SUTURE STRATAFIX SPRL SZ 1 L14IN ABSRB VLT L48CM CTX 1/2 SXPD2B405

## (undated) DEVICE — POSITIONER HD W8XH4XL8.5IN RASPBERRY FOAM SLT

## (undated) DEVICE — Device

## (undated) DEVICE — BASIC SINGLE BASIN BTC-LF: Brand: MEDLINE INDUSTRIES, INC.

## (undated) DEVICE — DUAL CUT SAGITTAL BLADE